# Patient Record
Sex: FEMALE | Race: ASIAN | NOT HISPANIC OR LATINO
[De-identification: names, ages, dates, MRNs, and addresses within clinical notes are randomized per-mention and may not be internally consistent; named-entity substitution may affect disease eponyms.]

---

## 2017-08-02 ENCOUNTER — APPOINTMENT (OUTPATIENT)
Dept: MAMMOGRAPHY | Facility: IMAGING CENTER | Age: 62
End: 2017-08-02
Payer: COMMERCIAL

## 2017-08-02 ENCOUNTER — OUTPATIENT (OUTPATIENT)
Dept: OUTPATIENT SERVICES | Facility: HOSPITAL | Age: 62
LOS: 1 days | End: 2017-08-02
Payer: COMMERCIAL

## 2017-08-02 DIAGNOSIS — Z00.8 ENCOUNTER FOR OTHER GENERAL EXAMINATION: ICD-10-CM

## 2017-08-02 PROCEDURE — 77067 SCR MAMMO BI INCL CAD: CPT | Mod: 26

## 2017-08-02 PROCEDURE — 77063 BREAST TOMOSYNTHESIS BI: CPT

## 2017-08-02 PROCEDURE — G0202: CPT | Mod: 26

## 2017-08-02 PROCEDURE — 77063 BREAST TOMOSYNTHESIS BI: CPT | Mod: 26

## 2017-08-02 PROCEDURE — 77067 SCR MAMMO BI INCL CAD: CPT

## 2018-09-06 ENCOUNTER — OUTPATIENT (OUTPATIENT)
Dept: OUTPATIENT SERVICES | Facility: HOSPITAL | Age: 63
LOS: 1 days | End: 2018-09-06
Payer: COMMERCIAL

## 2018-09-06 ENCOUNTER — APPOINTMENT (OUTPATIENT)
Dept: MAMMOGRAPHY | Facility: IMAGING CENTER | Age: 63
End: 2018-09-06
Payer: COMMERCIAL

## 2018-09-06 DIAGNOSIS — Z00.8 ENCOUNTER FOR OTHER GENERAL EXAMINATION: ICD-10-CM

## 2018-09-06 PROCEDURE — 77067 SCR MAMMO BI INCL CAD: CPT | Mod: 26

## 2018-09-06 PROCEDURE — 77063 BREAST TOMOSYNTHESIS BI: CPT

## 2018-09-06 PROCEDURE — 77067 SCR MAMMO BI INCL CAD: CPT

## 2018-09-06 PROCEDURE — 77063 BREAST TOMOSYNTHESIS BI: CPT | Mod: 26

## 2018-10-31 ENCOUNTER — APPOINTMENT (OUTPATIENT)
Dept: GASTROENTEROLOGY | Facility: CLINIC | Age: 63
End: 2018-10-31
Payer: COMMERCIAL

## 2018-10-31 VITALS
DIASTOLIC BLOOD PRESSURE: 70 MMHG | RESPIRATION RATE: 16 BRPM | TEMPERATURE: 97.7 F | HEIGHT: 62 IN | SYSTOLIC BLOOD PRESSURE: 110 MMHG | HEART RATE: 62 BPM | WEIGHT: 97 LBS | BODY MASS INDEX: 17.85 KG/M2 | OXYGEN SATURATION: 98 %

## 2018-10-31 DIAGNOSIS — Z79.01 LONG TERM (CURRENT) USE OF ANTICOAGULANTS: ICD-10-CM

## 2018-10-31 DIAGNOSIS — I48.91 UNSPECIFIED ATRIAL FIBRILLATION: ICD-10-CM

## 2018-10-31 DIAGNOSIS — E11.9 TYPE 2 DIABETES MELLITUS W/OUT COMPLICATIONS: ICD-10-CM

## 2018-10-31 DIAGNOSIS — Z86.79 PERSONAL HISTORY OF OTHER DISEASES OF THE CIRCULATORY SYSTEM: ICD-10-CM

## 2018-10-31 DIAGNOSIS — Z86.39 PERSONAL HISTORY OF OTHER ENDOCRINE, NUTRITIONAL AND METABOLIC DISEASE: ICD-10-CM

## 2018-10-31 DIAGNOSIS — Z78.9 OTHER SPECIFIED HEALTH STATUS: ICD-10-CM

## 2018-10-31 PROCEDURE — 99204 OFFICE O/P NEW MOD 45 MIN: CPT

## 2018-10-31 RX ORDER — WARFARIN 2.5 MG/1
2.5 TABLET ORAL
Refills: 0 | Status: ACTIVE | COMMUNITY

## 2018-10-31 RX ORDER — SITAGLIPTIN AND METFORMIN HYDROCHLORIDE 50; 1000 MG/1; MG/1
50-1000 TABLET, FILM COATED ORAL
Refills: 0 | Status: ACTIVE | COMMUNITY

## 2018-10-31 RX ORDER — METOPROLOL SUCCINATE 25 MG/1
25 TABLET, EXTENDED RELEASE ORAL
Refills: 0 | Status: ACTIVE | COMMUNITY

## 2018-10-31 RX ORDER — DIGOXIN 250 MCG
0.25 TABLET ORAL
Refills: 0 | Status: ACTIVE | COMMUNITY

## 2018-10-31 RX ORDER — ENALAPRIL MALEATE 2.5 MG/1
2.5 TABLET ORAL
Refills: 0 | Status: ACTIVE | COMMUNITY

## 2018-10-31 RX ORDER — SIMVASTATIN 20 MG/1
20 TABLET, FILM COATED ORAL
Refills: 0 | Status: ACTIVE | COMMUNITY

## 2019-01-10 ENCOUNTER — MESSAGE (OUTPATIENT)
Age: 64
End: 2019-01-10

## 2019-01-10 DIAGNOSIS — Z12.11 ENCOUNTER FOR SCREENING FOR MALIGNANT NEOPLASM OF COLON: ICD-10-CM

## 2019-02-19 ENCOUNTER — MESSAGE (OUTPATIENT)
Age: 64
End: 2019-02-19

## 2019-02-19 ENCOUNTER — MEDICATION RENEWAL (OUTPATIENT)
Age: 64
End: 2019-02-19

## 2019-02-19 RX ORDER — POLYETHYLENE GLYCOL 3350, SODIUM SULFATE, SODIUM CHLORIDE, POTASSIUM CHLORIDE, ASCORBIC ACID, SODIUM ASCORBATE 7.5-2.691G
100 KIT ORAL
Qty: 1 | Refills: 0 | Status: DISCONTINUED | COMMUNITY
Start: 2019-01-10 | End: 2019-02-19

## 2019-02-19 RX ORDER — POLYETHYLENE GLYCOL 3350, SODIUM SULFATE, SODIUM CHLORIDE, POTASSIUM CHLORIDE, ASCORBIC ACID, SODIUM ASCORBATE 7.5-2.691G
100 KIT ORAL
Qty: 1 | Refills: 0 | Status: DISCONTINUED | COMMUNITY
Start: 2018-10-31 | End: 2019-02-19

## 2019-02-27 ENCOUNTER — MEDICATION RENEWAL (OUTPATIENT)
Age: 64
End: 2019-02-27

## 2019-02-27 RX ORDER — POLYETHYLENE GLYCOL 3350 AND ELECTROLYTES WITH LEMON FLAVOR 236; 22.74; 6.74; 5.86; 2.97 G/4L; G/4L; G/4L; G/4L; G/4L
236 POWDER, FOR SOLUTION ORAL
Qty: 1 | Refills: 0 | Status: ACTIVE | COMMUNITY
Start: 2019-02-19 | End: 1900-01-01

## 2019-02-28 ENCOUNTER — MESSAGE (OUTPATIENT)
Age: 64
End: 2019-02-28

## 2019-03-07 ENCOUNTER — APPOINTMENT (OUTPATIENT)
Dept: GASTROENTEROLOGY | Facility: HOSPITAL | Age: 64
End: 2019-03-07

## 2020-12-21 PROBLEM — Z12.11 ENCOUNTER FOR SCREENING COLONOSCOPY: Status: RESOLVED | Noted: 2018-10-31 | Resolved: 2020-12-21

## 2020-12-31 ENCOUNTER — OUTPATIENT (OUTPATIENT)
Dept: OUTPATIENT SERVICES | Facility: HOSPITAL | Age: 65
LOS: 1 days | End: 2020-12-31
Payer: COMMERCIAL

## 2020-12-31 ENCOUNTER — APPOINTMENT (OUTPATIENT)
Dept: MAMMOGRAPHY | Facility: IMAGING CENTER | Age: 65
End: 2020-12-31
Payer: COMMERCIAL

## 2020-12-31 DIAGNOSIS — Z00.8 ENCOUNTER FOR OTHER GENERAL EXAMINATION: ICD-10-CM

## 2020-12-31 PROCEDURE — 77063 BREAST TOMOSYNTHESIS BI: CPT

## 2020-12-31 PROCEDURE — 77063 BREAST TOMOSYNTHESIS BI: CPT | Mod: 26

## 2020-12-31 PROCEDURE — 77067 SCR MAMMO BI INCL CAD: CPT

## 2020-12-31 PROCEDURE — 77067 SCR MAMMO BI INCL CAD: CPT | Mod: 26

## 2022-03-15 ENCOUNTER — OUTPATIENT (OUTPATIENT)
Dept: OUTPATIENT SERVICES | Facility: HOSPITAL | Age: 67
LOS: 1 days | End: 2022-03-15
Payer: COMMERCIAL

## 2022-03-15 ENCOUNTER — APPOINTMENT (OUTPATIENT)
Dept: MAMMOGRAPHY | Facility: IMAGING CENTER | Age: 67
End: 2022-03-15
Payer: COMMERCIAL

## 2022-03-15 DIAGNOSIS — Z00.8 ENCOUNTER FOR OTHER GENERAL EXAMINATION: ICD-10-CM

## 2022-03-15 PROCEDURE — 77063 BREAST TOMOSYNTHESIS BI: CPT

## 2022-03-15 PROCEDURE — 77067 SCR MAMMO BI INCL CAD: CPT

## 2022-03-15 PROCEDURE — 77063 BREAST TOMOSYNTHESIS BI: CPT | Mod: 26

## 2022-03-15 PROCEDURE — 77067 SCR MAMMO BI INCL CAD: CPT | Mod: 26

## 2023-07-27 ENCOUNTER — APPOINTMENT (OUTPATIENT)
Dept: MAMMOGRAPHY | Facility: IMAGING CENTER | Age: 68
End: 2023-07-27
Payer: COMMERCIAL

## 2023-07-27 ENCOUNTER — OUTPATIENT (OUTPATIENT)
Dept: OUTPATIENT SERVICES | Facility: HOSPITAL | Age: 68
LOS: 1 days | End: 2023-07-27
Payer: COMMERCIAL

## 2023-07-27 DIAGNOSIS — Z00.8 ENCOUNTER FOR OTHER GENERAL EXAMINATION: ICD-10-CM

## 2023-07-27 PROCEDURE — 77063 BREAST TOMOSYNTHESIS BI: CPT | Mod: 26

## 2023-07-27 PROCEDURE — 77067 SCR MAMMO BI INCL CAD: CPT

## 2023-07-27 PROCEDURE — 77063 BREAST TOMOSYNTHESIS BI: CPT

## 2023-07-27 PROCEDURE — 77067 SCR MAMMO BI INCL CAD: CPT | Mod: 26

## 2023-10-30 ENCOUNTER — INPATIENT (INPATIENT)
Facility: HOSPITAL | Age: 68
LOS: 3 days | Discharge: HOME | End: 2023-11-03
Attending: HOSPITALIST | Admitting: HOSPITALIST
Payer: COMMERCIAL

## 2023-10-30 VITALS
HEART RATE: 97 BPM | OXYGEN SATURATION: 100 % | SYSTOLIC BLOOD PRESSURE: 127 MMHG | TEMPERATURE: 98 F | RESPIRATION RATE: 18 BRPM | DIASTOLIC BLOOD PRESSURE: 57 MMHG

## 2023-10-30 DIAGNOSIS — R42 DIZZINESS AND GIDDINESS: ICD-10-CM

## 2023-10-30 LAB
ALBUMIN SERPL ELPH-MCNC: 4.7 G/DL — SIGNIFICANT CHANGE UP (ref 3.3–5)
ALBUMIN SERPL ELPH-MCNC: 4.7 G/DL — SIGNIFICANT CHANGE UP (ref 3.3–5)
ALP SERPL-CCNC: 69 U/L — SIGNIFICANT CHANGE UP (ref 40–120)
ALP SERPL-CCNC: 69 U/L — SIGNIFICANT CHANGE UP (ref 40–120)
ALT FLD-CCNC: 30 U/L — SIGNIFICANT CHANGE UP (ref 4–33)
ALT FLD-CCNC: 30 U/L — SIGNIFICANT CHANGE UP (ref 4–33)
ANION GAP SERPL CALC-SCNC: 8 MMOL/L — SIGNIFICANT CHANGE UP (ref 7–14)
ANION GAP SERPL CALC-SCNC: 8 MMOL/L — SIGNIFICANT CHANGE UP (ref 7–14)
APTT BLD: 43.3 SEC — HIGH (ref 24.5–35.6)
APTT BLD: 43.3 SEC — HIGH (ref 24.5–35.6)
AST SERPL-CCNC: 23 U/L — SIGNIFICANT CHANGE UP (ref 4–32)
AST SERPL-CCNC: 23 U/L — SIGNIFICANT CHANGE UP (ref 4–32)
BILIRUB SERPL-MCNC: 0.6 MG/DL — SIGNIFICANT CHANGE UP (ref 0.2–1.2)
BILIRUB SERPL-MCNC: 0.6 MG/DL — SIGNIFICANT CHANGE UP (ref 0.2–1.2)
BUN SERPL-MCNC: 15 MG/DL — SIGNIFICANT CHANGE UP (ref 7–23)
BUN SERPL-MCNC: 15 MG/DL — SIGNIFICANT CHANGE UP (ref 7–23)
CALCIUM SERPL-MCNC: 9.5 MG/DL — SIGNIFICANT CHANGE UP (ref 8.4–10.5)
CALCIUM SERPL-MCNC: 9.5 MG/DL — SIGNIFICANT CHANGE UP (ref 8.4–10.5)
CHLORIDE SERPL-SCNC: 103 MMOL/L — SIGNIFICANT CHANGE UP (ref 98–107)
CHLORIDE SERPL-SCNC: 103 MMOL/L — SIGNIFICANT CHANGE UP (ref 98–107)
CO2 SERPL-SCNC: 30 MMOL/L — SIGNIFICANT CHANGE UP (ref 22–31)
CO2 SERPL-SCNC: 30 MMOL/L — SIGNIFICANT CHANGE UP (ref 22–31)
CREAT SERPL-MCNC: 0.4 MG/DL — LOW (ref 0.5–1.3)
CREAT SERPL-MCNC: 0.4 MG/DL — LOW (ref 0.5–1.3)
EGFR: 109 ML/MIN/1.73M2 — SIGNIFICANT CHANGE UP
EGFR: 109 ML/MIN/1.73M2 — SIGNIFICANT CHANGE UP
GLUCOSE SERPL-MCNC: 200 MG/DL — HIGH (ref 70–99)
GLUCOSE SERPL-MCNC: 200 MG/DL — HIGH (ref 70–99)
HCT VFR BLD CALC: 47.3 % — HIGH (ref 34.5–45)
HCT VFR BLD CALC: 47.3 % — HIGH (ref 34.5–45)
HGB BLD-MCNC: 15.2 G/DL — SIGNIFICANT CHANGE UP (ref 11.5–15.5)
HGB BLD-MCNC: 15.2 G/DL — SIGNIFICANT CHANGE UP (ref 11.5–15.5)
INR BLD: 2.06 RATIO — HIGH (ref 0.85–1.18)
INR BLD: 2.06 RATIO — HIGH (ref 0.85–1.18)
MCHC RBC-ENTMCNC: 29.9 PG — SIGNIFICANT CHANGE UP (ref 27–34)
MCHC RBC-ENTMCNC: 29.9 PG — SIGNIFICANT CHANGE UP (ref 27–34)
MCHC RBC-ENTMCNC: 32.1 GM/DL — SIGNIFICANT CHANGE UP (ref 32–36)
MCHC RBC-ENTMCNC: 32.1 GM/DL — SIGNIFICANT CHANGE UP (ref 32–36)
MCV RBC AUTO: 92.9 FL — SIGNIFICANT CHANGE UP (ref 80–100)
MCV RBC AUTO: 92.9 FL — SIGNIFICANT CHANGE UP (ref 80–100)
NRBC # BLD: 0 /100 WBCS — SIGNIFICANT CHANGE UP (ref 0–0)
NRBC # BLD: 0 /100 WBCS — SIGNIFICANT CHANGE UP (ref 0–0)
NRBC # FLD: 0 K/UL — SIGNIFICANT CHANGE UP (ref 0–0)
NRBC # FLD: 0 K/UL — SIGNIFICANT CHANGE UP (ref 0–0)
PLATELET # BLD AUTO: 251 K/UL — SIGNIFICANT CHANGE UP (ref 150–400)
PLATELET # BLD AUTO: 251 K/UL — SIGNIFICANT CHANGE UP (ref 150–400)
POTASSIUM SERPL-MCNC: 4.7 MMOL/L — SIGNIFICANT CHANGE UP (ref 3.5–5.3)
POTASSIUM SERPL-MCNC: 4.7 MMOL/L — SIGNIFICANT CHANGE UP (ref 3.5–5.3)
POTASSIUM SERPL-SCNC: 4.7 MMOL/L — SIGNIFICANT CHANGE UP (ref 3.5–5.3)
POTASSIUM SERPL-SCNC: 4.7 MMOL/L — SIGNIFICANT CHANGE UP (ref 3.5–5.3)
PROT SERPL-MCNC: 7.5 G/DL — SIGNIFICANT CHANGE UP (ref 6–8.3)
PROT SERPL-MCNC: 7.5 G/DL — SIGNIFICANT CHANGE UP (ref 6–8.3)
PROTHROM AB SERPL-ACNC: 22.6 SEC — HIGH (ref 9.5–13)
PROTHROM AB SERPL-ACNC: 22.6 SEC — HIGH (ref 9.5–13)
RBC # BLD: 5.09 M/UL — SIGNIFICANT CHANGE UP (ref 3.8–5.2)
RBC # BLD: 5.09 M/UL — SIGNIFICANT CHANGE UP (ref 3.8–5.2)
RBC # FLD: 13.3 % — SIGNIFICANT CHANGE UP (ref 10.3–14.5)
RBC # FLD: 13.3 % — SIGNIFICANT CHANGE UP (ref 10.3–14.5)
SODIUM SERPL-SCNC: 141 MMOL/L — SIGNIFICANT CHANGE UP (ref 135–145)
SODIUM SERPL-SCNC: 141 MMOL/L — SIGNIFICANT CHANGE UP (ref 135–145)
WBC # BLD: 7.17 K/UL — SIGNIFICANT CHANGE UP (ref 3.8–10.5)
WBC # BLD: 7.17 K/UL — SIGNIFICANT CHANGE UP (ref 3.8–10.5)
WBC # FLD AUTO: 7.17 K/UL — SIGNIFICANT CHANGE UP (ref 3.8–10.5)
WBC # FLD AUTO: 7.17 K/UL — SIGNIFICANT CHANGE UP (ref 3.8–10.5)

## 2023-10-30 PROCEDURE — 99285 EMERGENCY DEPT VISIT HI MDM: CPT

## 2023-10-30 PROCEDURE — 70498 CT ANGIOGRAPHY NECK: CPT | Mod: 26,MA

## 2023-10-30 PROCEDURE — 70496 CT ANGIOGRAPHY HEAD: CPT | Mod: 26,MA

## 2023-10-30 RX ORDER — SODIUM CHLORIDE 9 MG/ML
1000 INJECTION INTRAMUSCULAR; INTRAVENOUS; SUBCUTANEOUS ONCE
Refills: 0 | Status: COMPLETED | OUTPATIENT
Start: 2023-10-30 | End: 2023-10-30

## 2023-10-30 RX ORDER — SODIUM CHLORIDE 9 MG/ML
1000 INJECTION, SOLUTION INTRAVENOUS ONCE
Refills: 0 | Status: COMPLETED | OUTPATIENT
Start: 2023-10-30 | End: 2023-10-30

## 2023-10-30 RX ORDER — WARFARIN SODIUM 2.5 MG/1
5 TABLET ORAL ONCE
Refills: 0 | Status: COMPLETED | OUTPATIENT
Start: 2023-10-30 | End: 2023-10-30

## 2023-10-30 RX ORDER — MECLIZINE HCL 12.5 MG
25 TABLET ORAL ONCE
Refills: 0 | Status: COMPLETED | OUTPATIENT
Start: 2023-10-30 | End: 2023-10-30

## 2023-10-30 RX ADMIN — WARFARIN SODIUM 5 MILLIGRAM(S): 2.5 TABLET ORAL at 23:19

## 2023-10-30 RX ADMIN — Medication 25 MILLIGRAM(S): at 14:19

## 2023-10-30 RX ADMIN — SODIUM CHLORIDE 1000 MILLILITER(S): 9 INJECTION, SOLUTION INTRAVENOUS at 20:11

## 2023-10-30 RX ADMIN — SODIUM CHLORIDE 1000 MILLILITER(S): 9 INJECTION INTRAMUSCULAR; INTRAVENOUS; SUBCUTANEOUS at 14:59

## 2023-10-30 NOTE — ED ADULT TRIAGE NOTE - CHIEF COMPLAINT QUOTE
Patient c/o dizziness x 3 days. phx-DM2, AVR, MVR. Denies CP, no SOB noted. Breathing non-labored. FS don.e Patient c/o dizziness x 3 days. phx-DM2, AVR, MVR on coumadin. Denies CP, no SOB noted. Breathing non-labored. FS don.e

## 2023-10-30 NOTE — ED PROVIDER NOTE - NSICDXPASTMEDICALHX_GEN_ALL_CORE_FT
PAST MEDICAL HISTORY:  H/O aortic valve replacement     H/O mitral valve repair     H/O mitral valve repair     T2DM (type 2 diabetes mellitus)

## 2023-10-30 NOTE — ED PROVIDER NOTE - ATTENDING CONTRIBUTION TO CARE
I performed a face-to-face evaluation of the patient and performed a history and physical examination. I agree with the history and physical examination. If this was a PA visit, I personally saw the patient with the PA and performed a substantive portion of the visit including all aspects of the medical decision making.    AVR and MVR (Coumadin). P/w orthostatic dizziness. No bleeding. 3 lb wt. loss in the last 1-2 yrs. Has appetite. Hasn't had EGD. No F. No LE edema. Has known AS murmur. Check EKG. Give IVF. Check Hb and INR (? bleeding on Coumadin).

## 2023-10-30 NOTE — CONSULT NOTE ADULT - SUBJECTIVE AND OBJECTIVE BOX
Neurology Consultation     HPI: Patient CAMILO RUBALCAVA is a 69yo F PMHx DM2, mitral/aortic valve repair on coumadin, presents to ED for dizziness     ROS:    NIHSS:   preMRS:   ICH:   ABCD2:    Review of Systems:     PAST MEDICAL & SURGICAL HISTORY:  T2DM (type 2 diabetes mellitus)    H/O aortic valve replacement    H/O mitral valve repair    H/O mitral valve repair    FAMILY HISTORY:    MEDICATIONS   MEDICATIONS  (STANDING):    MEDICATIONS  (PRN):    ALLERGIES/INTOLERANCES:  Allergies  No Known Allergies    Intolerances    VITALS & EXAMINATION:  Vital Signs Last 24 Hrs  T(C): 36.8 (30 Oct 2023 12:41), Max: 36.8 (30 Oct 2023 12:41)  T(F): 98.2 (30 Oct 2023 12:41), Max: 98.2 (30 Oct 2023 12:41)  HR: 97 (30 Oct 2023 12:41) (97 - 97)  BP: 127/57 (30 Oct 2023 12:41) (127/57 - 127/57)  BP(mean): --  RR: 18 (30 Oct 2023 12:41) (18 - 18)  SpO2: 100% (30 Oct 2023 12:41) (100% - 100%)    Parameters below as of 30 Oct 2023 12:41  Patient On (Oxygen Delivery Method): room air           LABORATORY:  CBC                       15.2   7.17  )-----------( 251      ( 30 Oct 2023 15:23 )             47.3     Chem 10-30    141  |  103  |  15  ----------------------------<  200<H>  4.7   |  30  |  0.40<L>    Ca    9.5      30 Oct 2023 15:23    TPro  7.5  /  Alb  4.7  /  TBili  0.6  /  DBili  x   /  AST  23  /  ALT  30  /  AlkPhos  69  10-30    LFTs LIVER FUNCTIONS - ( 30 Oct 2023 15:23 )  Alb: 4.7 g/dL / Pro: 7.5 g/dL / ALK PHOS: 69 U/L / ALT: 30 U/L / AST: 23 U/L / GGT: x           Coagulopathy PT/INR - ( 30 Oct 2023 15:23 )   PT: 22.6 sec;   INR: 2.06 ratio         PTT - ( 30 Oct 2023 15:23 )  PTT:43.3 sec  Lipid Panel   A1c   Cardiac enzymes     U/A Urinalysis Basic - ( 30 Oct 2023 15:23 )    Color: x / Appearance: x / SG: x / pH: x  Gluc: 200 mg/dL / Ketone: x  / Bili: x / Urobili: x   Blood: x / Protein: x / Nitrite: x   Leuk Esterase: x / RBC: x / WBC x   Sq Epi: x / Non Sq Epi: x / Bacteria: x      CSF  Other    STUDIES & IMAGING: (EEG, CT, MR, U/S, TTE/NATHAN): Neurology Consultation     Patient accompanied by spouse    HPI: Patient CAMILO RUBALCAVA is a 67yo F PMHx DM2, mitral/aortic valve repair on coumadin, presents to ED for dizziness. Patient states since Saturday a.m. she has experienced constant dizziness that is not room spinning or violetta lightheadedness. States dizziness improves when laying at 45% incline but not flat or sitting, standing.   No associated fever, chills, headache, nausea, vomiting      ROS:    NIHSS:   preMRS:   ICH:   ABCD2:    Review of Systems:     PAST MEDICAL & SURGICAL HISTORY:  T2DM (type 2 diabetes mellitus)    H/O aortic valve replacement    H/O mitral valve repair    H/O mitral valve repair    FAMILY HISTORY:    MEDICATIONS   MEDICATIONS  (STANDING):    MEDICATIONS  (PRN):    ALLERGIES/INTOLERANCES:  Allergies  No Known Allergies    Intolerances    VITALS & EXAMINATION:  Vital Signs Last 24 Hrs  T(C): 36.8 (30 Oct 2023 12:41), Max: 36.8 (30 Oct 2023 12:41)  T(F): 98.2 (30 Oct 2023 12:41), Max: 98.2 (30 Oct 2023 12:41)  HR: 97 (30 Oct 2023 12:41) (97 - 97)  BP: 127/57 (30 Oct 2023 12:41) (127/57 - 127/57)  BP(mean): --  RR: 18 (30 Oct 2023 12:41) (18 - 18)  SpO2: 100% (30 Oct 2023 12:41) (100% - 100%)    Parameters below as of 30 Oct 2023 12:41  Patient On (Oxygen Delivery Method): room air           LABORATORY:  CBC                       15.2   7.17  )-----------( 251      ( 30 Oct 2023 15:23 )             47.3     Chem 10-30    141  |  103  |  15  ----------------------------<  200<H>  4.7   |  30  |  0.40<L>    Ca    9.5      30 Oct 2023 15:23    TPro  7.5  /  Alb  4.7  /  TBili  0.6  /  DBili  x   /  AST  23  /  ALT  30  /  AlkPhos  69  10-30    LFTs LIVER FUNCTIONS - ( 30 Oct 2023 15:23 )  Alb: 4.7 g/dL / Pro: 7.5 g/dL / ALK PHOS: 69 U/L / ALT: 30 U/L / AST: 23 U/L / GGT: x           Coagulopathy PT/INR - ( 30 Oct 2023 15:23 )   PT: 22.6 sec;   INR: 2.06 ratio         PTT - ( 30 Oct 2023 15:23 )  PTT:43.3 sec  Lipid Panel   A1c   Cardiac enzymes     U/A Urinalysis Basic - ( 30 Oct 2023 15:23 )    Color: x / Appearance: x / SG: x / pH: x  Gluc: 200 mg/dL / Ketone: x  / Bili: x / Urobili: x   Blood: x / Protein: x / Nitrite: x   Leuk Esterase: x / RBC: x / WBC x   Sq Epi: x / Non Sq Epi: x / Bacteria: x      CSF  Other    STUDIES & IMAGING: (EEG, CT, MR, U/S, TTE/NATHAN): Neurology Consultation     Patient accompanied by spouse    HPI: Patient Jj Rodriguez is a 67yo F PMHx DM2, mitral/aortic valve repair on coumadin, presents to ED for dizziness. Patient states since Saturday a.m. she has experienced constant dizziness that is not room spinning or violetta lightheadedness. States dizziness improves when laying at 45% incline but not flat or sitting, standing. Never had similar symptoms in similar severity. Does note in both eyes when looking at light she sees streaks. No double vision. No associated fever, chills, headache, nausea, vomiting, headaches, tinnitus, ear fullness, speech changes, word finding difficulty, neck stiffness, extremity weakness numbness, gait difficulty, chest pain, shortness of breath. No hx of stroke. She does have mechanical valves for which she is on coumadin. Not on asa. Nonsmoking.     NIHSS: 0  preMRS:  0    PAST MEDICAL & SURGICAL HISTORY:  T2DM (type 2 diabetes mellitus)    H/O aortic valve replacement    H/O mitral valve repair    H/O mitral valve repair    FAMILY HISTORY:    MEDICATIONS   MEDICATIONS  (STANDING):    MEDICATIONS  (PRN):    ALLERGIES/INTOLERANCES:  Allergies  No Known Allergies    Intolerances    VITALS & EXAMINATION:  Vital Signs Last 24 Hrs  T(C): 36.8 (30 Oct 2023 12:41), Max: 36.8 (30 Oct 2023 12:41)  T(F): 98.2 (30 Oct 2023 12:41), Max: 98.2 (30 Oct 2023 12:41)  HR: 97 (30 Oct 2023 12:41) (97 - 97)  BP: 127/57 (30 Oct 2023 12:41) (127/57 - 127/57)  BP(mean): --  RR: 18 (30 Oct 2023 12:41) (18 - 18)  SpO2: 100% (30 Oct 2023 12:41) (100% - 100%)    Parameters below as of 30 Oct 2023 12:41  Patient On (Oxygen Delivery Method): room air     General:  Constitutional: Female, appears stated age, nontoxic, not in distress, sitting up in stretcher  Head: Normocephalic;   Eyes: clear sclera;   Extremities: No cyanosis;   Resp: breathing comfortably  Neck: no nuchal rigidity      Neurological (>12):  MS: Awake, alert. Oriented person place situation. Follows commands. Attends to examiner  Language: Speech is hypophonic, clear, fluent, good repetition,  comprehension, registration of words.  CNs: PERRL (R 3mm, L 3mm). VFF. EOMI. No disconjugate gaze, nystagmus. V1-3 intact LT, No facial asymmetry b/l. Hearing grossly normal to finger rub. Tongue midline and can move side to side.     Motor - Normal bulk and tone throughout. No pronator drift.  L/R (out of 5 each)       Deltoid  5/5    Biceps   5/5      Triceps  5/5         Wrist str 5/5       5/5   L/R (out of 5 each)       Hip Flexion  5/5   Knee Extension  5/5  Dorsiflexion  5/5      Plantar Flexion 5/5     Sensation: Intact to LT b/l. Cortical: Extinction on DSS (neglect): none  Reflexes L/R:  Biceps(C5) 2/2  BR(C6) 2/2   Triceps(C7)  2/2 Patellar(L4)   2/2   Ankles 2/2   Toes: mute b/l, no ankle clonus  Negative davis reflex b/l  Coordination: No dysmetria to FTN b/l UE, HTS intact  Gait: difficult to assess due to dizziness     LABORATORY:  CBC                       15.2   7.17  )-----------( 251      ( 30 Oct 2023 15:23 )             47.3     Chem 10-30    141  |  103  |  15  ----------------------------<  200<H>  4.7   |  30  |  0.40<L>    Ca    9.5      30 Oct 2023 15:23    TPro  7.5  /  Alb  4.7  /  TBili  0.6  /  DBili  x   /  AST  23  /  ALT  30  /  AlkPhos  69  10-30    LFTs LIVER FUNCTIONS - ( 30 Oct 2023 15:23 )  Alb: 4.7 g/dL / Pro: 7.5 g/dL / ALK PHOS: 69 U/L / ALT: 30 U/L / AST: 23 U/L / GGT: x           Coagulopathy PT/INR - ( 30 Oct 2023 15:23 )   PT: 22.6 sec;   INR: 2.06 ratio      PTT - ( 30 Oct 2023 15:23 )  PTT:43.3 sec  Lipid Panel   A1c   Cardiac enzymes     U/A Urinalysis Basic - ( 30 Oct 2023 15:23 )    Color: x / Appearance: x / SG: x / pH: x  Gluc: 200 mg/dL / Ketone: x  / Bili: x / Urobili: x   Blood: x / Protein: x / Nitrite: x   Leuk Esterase: x / RBC: x / WBC x   Sq Epi: x / Non Sq Epi: x / Bacteria: x    CSF  Other    STUDIES & IMAGING: (EEG, CT, MR, U/S, TTE/NATHAN): Neurology Consultation     Patient accompanied by spouse    HPI: Patient Jj Rodriguez is a 67yo F PMHx DM2, mitral/aortic valve repair on coumadin, presents to ED for dizziness. Patient states since Saturday a.m. she has experienced constant dizziness that is not room spinning or violetta lightheadedness. States dizziness improves when laying at 45% incline but not flat or sitting, standing. Never had similar symptoms in similar severity. Does note in both eyes when looking at light she sees streaks. No double vision. No associated fever, chills, headache, nausea, vomiting, headaches, tinnitus, ear fullness, speech changes, word finding difficulty, neck stiffness, extremity weakness numbness, gait difficulty, chest pain, shortness of breath. No hx of stroke. She does have mechanical valves for which she is on coumadin. Not on asa. Nonsmoking.     NIHSS: 0  preMRS:  0    PAST MEDICAL & SURGICAL HISTORY:  T2DM (type 2 diabetes mellitus)    H/O aortic valve replacement    H/O mitral valve repair    H/O mitral valve repair    FAMILY HISTORY:    MEDICATIONS   MEDICATIONS  (STANDING):    MEDICATIONS  (PRN):    ALLERGIES/INTOLERANCES:  Allergies  No Known Allergies    Intolerances    VITALS & EXAMINATION:  Vital Signs Last 24 Hrs  T(C): 36.8 (30 Oct 2023 12:41), Max: 36.8 (30 Oct 2023 12:41)  T(F): 98.2 (30 Oct 2023 12:41), Max: 98.2 (30 Oct 2023 12:41)  HR: 97 (30 Oct 2023 12:41) (97 - 97)  BP: 127/57 (30 Oct 2023 12:41) (127/57 - 127/57)  BP(mean): --  RR: 18 (30 Oct 2023 12:41) (18 - 18)  SpO2: 100% (30 Oct 2023 12:41) (100% - 100%)    Parameters below as of 30 Oct 2023 12:41  Patient On (Oxygen Delivery Method): room air     General:  Constitutional: Female, appears stated age, nontoxic, not in distress, sitting up in stretcher  Head: Normocephalic;   Eyes: clear sclera;   Extremities: No cyanosis;   Resp: breathing comfortably  Neck: no nuchal rigidity      Neurological (>12):  MS: Awake, alert. Oriented person place situation. Follows commands. Attends to examiner  Language: Speech is hypophonic, clear, fluent, good repetition,  comprehension, registration of words.  CNs: PERRL (R 3mm, L 3mm). VFF. EOMI. No disconjugate gaze, nystagmus. V1-3 intact LT, No facial asymmetry b/l. Hearing grossly normal to finger rub. Tongue midline and can move side to side.     Motor - Normal bulk and tone throughout. No pronator drift.  L/R (out of 5 each)       Deltoid  5/5    Biceps   5/5      Triceps  5/5         Wrist str 5/5       5/5   L/R (out of 5 each)       Hip Flexion  5/5   Knee Extension  5/5  Dorsiflexion  5/5      Plantar Flexion 5/5     Sensation: Intact to LT b/l. Cortical: Extinction on DSS (neglect): none  Reflexes L/R:  Biceps(C5) 2/2  BR(C6) 2/2   Triceps(C7)  2/2 Patellar(L4)   2/2   Ankles 2/2   Toes: mute b/l, no ankle clonus  Negative davis reflex b/l  Coordination: No dysmetria to FTN b/l UE, HTS intact  Gait: difficult to assess due to dizziness     LABORATORY:  CBC                       15.2   7.17  )-----------( 251      ( 30 Oct 2023 15:23 )             47.3     Chem 10-30    141  |  103  |  15  ----------------------------<  200<H>  4.7   |  30  |  0.40<L>    Ca    9.5      30 Oct 2023 15:23    TPro  7.5  /  Alb  4.7  /  TBili  0.6  /  DBili  x   /  AST  23  /  ALT  30  /  AlkPhos  69  10-30    LFTs LIVER FUNCTIONS - ( 30 Oct 2023 15:23 )  Alb: 4.7 g/dL / Pro: 7.5 g/dL / ALK PHOS: 69 U/L / ALT: 30 U/L / AST: 23 U/L / GGT: x           Coagulopathy PT/INR - ( 30 Oct 2023 15:23 )   PT: 22.6 sec;   INR: 2.06 ratio      PTT - ( 30 Oct 2023 15:23 )  PTT:43.3 sec  Lipid Panel   A1c   Cardiac enzymes     U/A Urinalysis Basic - ( 30 Oct 2023 15:23 )    Color: x / Appearance: x / SG: x / pH: x  Gluc: 200 mg/dL / Ketone: x  / Bili: x / Urobili: x   Blood: x / Protein: x / Nitrite: x   Leuk Esterase: x / RBC: x / WBC x   Sq Epi: x / Non Sq Epi: x / Bacteria: x    CSF  Other    STUDIES & IMAGING: (EEG, CT, MR, U/S, TTE/NATHAN):    < from: CT Angio Neck w/ IV Cont (10.30.23 @ 21:13) >    ACC: [id number] EXAM:  CT ANGIO NECK (W)AW IC   ORDERED BY: ZULEYKA GTZ     ACC: [id number] EXAM:  CT ANGIO BRAIN (W)AW IC   ORDERED BY: ZULEYKA GTZ     PROCEDURE DATE:  10/30/2023    INTERPRETATION:  CT HEAD WITHOUT CONTRAST, CTA HEAD, CTA NECK    CLINICAL INDICATIONS:dizziness    TECHNIQUE: 90cc Omnipaque 350 Intravenous contrast was administered. 2-D   MIP images. 10cc of contrast was discarded.    COMPARISON: None    FINDINGS:    NONCONTRAST CT HEAD:  Brain parenchyma: Gray-white matterdiscrimination is well preserved.   There is no mass effect or intracranial hemorrhage.    Extra-axial compartments: No extra-axial fluid collections are present.   The ventricles and sulci are normal in size and configuration for   patient's stated age. The basal cisterns are patent. Craniocervical   junction and sella turcica are within normal limits.    Calvarium, paranasal sinuses, and orbits: The calvarium is intact.   Paranasal sinuses and mastoid air cells are clear. The orbits are within   normal limits.      CTA BRAIN:  Anterior circulation: The petrous, cavernous, and supraclinoid portions   of the internal carotid arteries opacify normally. The bilateral M1   segments are widely patent and the M2/M3 subdivisions follow a normal   course and caliber with symmetric appearance. The bilateral A1 segments   are widely patent and the A2/A3 subdivisions follow a normal course and   caliber with symmetric appearance. The anterior communicating artery is   clearly visualized. Bilateral posterior communicating arteries are not   clearly seen.    Posterior circulation: The intracranial portions of the bilateral   vertebral arteries are within normal limits. Bilateral PICA origins arise   normally from the distal V4 segments. The basilar confluence is   unremarkable and the basilar artery follows a normal course and caliber.   AICA origins are identified. The bilateral superior cerebellar arteries   are within normal limits. The bilateral P1 segments are widely patent,   and the P2/P3 subdivisions following normal course and caliber with   symmetric appearance.    CTA NECK:  Normal variant 4-vessel aortic arch is identified.    Anterior circulation: The bilateral common carotid arteries opacify   normally from their origins up to the level of the bifurcations. The   bilateral carotid bulbs are within normal limits. The cervical portions   of the internal carotid arteries opacify normally up to the level of the   skull base.    Posterior circulation: Normal anatomic origin of the right vertebral   artery is noted; there is variant origin of the left vertebral artery   directly from the aortic arch. The bilateral vertebral arteries opacify   normally through their cervical segments up to the level of the skull   base.    LUNG APICES: Clear.    THYROID: Homogeneous in attenuation.    SOFT TISSUES: Within normal limits.    BONES: Median sternotomy wires.    IMPRESSION:    NONCONTRAST CT HEAD:  No evidence of large territory infarct, intracranial hemorrhage, or mass   effect.    CTA HEAD/NECK:  No proximal large vessel occlusion, aneurysm, dissection, or   hemodynamically flow-limiting stenosis identified in the head and neck.    MRI brain could be considered if symptoms persist.    --- End of Report --- Neurology Consultation     Patient accompanied by spouse    HPI: Patient Jj Rodriguez is a 67yo F PMHx DM2, mitral/aortic valve repair on coumadin, presents to ED for dizziness. Patient states since Saturday a.m. she has experienced constant dizziness that is not room spinning or violetta lightheadedness. States dizziness improves when laying at 45% incline but not flat or sitting, standing. Never had similar symptoms in similar severity. Does note in either eye when looking at light she sees streaks. No double vision. No associated fever, chills, headache, nausea, vomiting, headaches, tinnitus, ear fullness, speech changes, word finding difficulty, neck stiffness, extremity weakness numbness, gait difficulty, chest pain, shortness of breath. No other symptoms. No hx of stroke. She does have mechanical valves for which she is on coumadin. Not on asa. Nonsmoking.     NIHSS: 0  preMRS:  0    PAST MEDICAL & SURGICAL HISTORY:  T2DM (type 2 diabetes mellitus)    H/O aortic valve replacement    H/O mitral valve repair    H/O mitral valve repair    FAMILY HISTORY:    MEDICATIONS   MEDICATIONS  (STANDING):    MEDICATIONS  (PRN):    ALLERGIES/INTOLERANCES:  Allergies  No Known Allergies    Intolerances    VITALS & EXAMINATION:  Vital Signs Last 24 Hrs  T(C): 36.8 (30 Oct 2023 12:41), Max: 36.8 (30 Oct 2023 12:41)  T(F): 98.2 (30 Oct 2023 12:41), Max: 98.2 (30 Oct 2023 12:41)  HR: 97 (30 Oct 2023 12:41) (97 - 97)  BP: 127/57 (30 Oct 2023 12:41) (127/57 - 127/57)  BP(mean): --  RR: 18 (30 Oct 2023 12:41) (18 - 18)  SpO2: 100% (30 Oct 2023 12:41) (100% - 100%)    Parameters below as of 30 Oct 2023 12:41  Patient On (Oxygen Delivery Method): room air     General:  Constitutional: Female, appears stated age, nontoxic, not in distress, sitting up in stretcher  Head: Normocephalic;   Eyes: clear sclera;   Extremities: No cyanosis;   Resp: breathing comfortably  Neck: no nuchal rigidity      Neurological (>12):  MS: Awake, alert. Oriented person place situation. Follows commands. Attends to examiner  Language: Speech is hypophonic, clear, fluent, good repetition,  comprehension, registration of words.  CNs: PERRL (R 3mm, L 3mm). VFF. EOMI. No disconjugate gaze, nystagmus. V1-3 intact LT, No facial asymmetry b/l. Hearing grossly normal to finger rub. Tongue midline and can move side to side.     Motor - Normal bulk and tone throughout. No pronator drift.  L/R (out of 5 each)       Deltoid  5/5    Biceps   5/5      Triceps  5/5         Wrist str 5/5       5/5   L/R (out of 5 each)       Hip Flexion  5/5   Knee Extension  5/5  Dorsiflexion  5/5      Plantar Flexion 5/5     Sensation: Intact to LT b/l. Cortical: Extinction on DSS (neglect): none  Reflexes L/R:  Biceps(C5) 2/2  BR(C6) 2/2   Triceps(C7)  2/2 Patellar(L4)   2/2   Ankles 2/2   Toes: mute b/l, no ankle clonus  Negative davis reflex b/l  Coordination: No dysmetria to FTN b/l UE. HTS intact  Gait: difficult to assess due to dizziness     LABORATORY:  CBC                       15.2   7.17  )-----------( 251      ( 30 Oct 2023 15:23 )             47.3     Chem 10-30    141  |  103  |  15  ----------------------------<  200<H>  4.7   |  30  |  0.40<L>    Ca    9.5      30 Oct 2023 15:23    TPro  7.5  /  Alb  4.7  /  TBili  0.6  /  DBili  x   /  AST  23  /  ALT  30  /  AlkPhos  69  10-30    LFTs LIVER FUNCTIONS - ( 30 Oct 2023 15:23 )  Alb: 4.7 g/dL / Pro: 7.5 g/dL / ALK PHOS: 69 U/L / ALT: 30 U/L / AST: 23 U/L / GGT: x           Coagulopathy PT/INR - ( 30 Oct 2023 15:23 )   PT: 22.6 sec;   INR: 2.06 ratio      PTT - ( 30 Oct 2023 15:23 )  PTT:43.3 sec  Lipid Panel   A1c   Cardiac enzymes     U/A Urinalysis Basic - ( 30 Oct 2023 15:23 )    Color: x / Appearance: x / SG: x / pH: x  Gluc: 200 mg/dL / Ketone: x  / Bili: x / Urobili: x   Blood: x / Protein: x / Nitrite: x   Leuk Esterase: x / RBC: x / WBC x   Sq Epi: x / Non Sq Epi: x / Bacteria: x    CSF  Other    STUDIES & IMAGING: (EEG, CT, MR, U/S, TTE/NATHAN):    < from: CT Angio Neck w/ IV Cont (10.30.23 @ 21:13) >    ACC: [id number] EXAM:  CT ANGIO NECK (W)AW IC   ORDERED BY: ZULEYKA GTZ     ACC: [id number] EXAM:  CT ANGIO BRAIN (W)AW IC   ORDERED BY: ZULEYKA GTZ     PROCEDURE DATE:  10/30/2023    INTERPRETATION:  CT HEAD WITHOUT CONTRAST, CTA HEAD, CTA NECK    CLINICAL INDICATIONS:dizziness    TECHNIQUE: 90cc Omnipaque 350 Intravenous contrast was administered. 2-D   MIP images. 10cc of contrast was discarded.    COMPARISON: None    FINDINGS:    NONCONTRAST CT HEAD:  Brain parenchyma: Gray-white matterdiscrimination is well preserved.   There is no mass effect or intracranial hemorrhage.    Extra-axial compartments: No extra-axial fluid collections are present.   The ventricles and sulci are normal in size and configuration for   patient's stated age. The basal cisterns are patent. Craniocervical   junction and sella turcica are within normal limits.    Calvarium, paranasal sinuses, and orbits: The calvarium is intact.   Paranasal sinuses and mastoid air cells are clear. The orbits are within   normal limits.      CTA BRAIN:  Anterior circulation: The petrous, cavernous, and supraclinoid portions   of the internal carotid arteries opacify normally. The bilateral M1   segments are widely patent and the M2/M3 subdivisions follow a normal   course and caliber with symmetric appearance. The bilateral A1 segments   are widely patent and the A2/A3 subdivisions follow a normal course and   caliber with symmetric appearance. The anterior communicating artery is   clearly visualized. Bilateral posterior communicating arteries are not   clearly seen.    Posterior circulation: The intracranial portions of the bilateral   vertebral arteries are within normal limits. Bilateral PICA origins arise   normally from the distal V4 segments. The basilar confluence is   unremarkable and the basilar artery follows a normal course and caliber.   AICA origins are identified. The bilateral superior cerebellar arteries   are within normal limits. The bilateral P1 segments are widely patent,   and the P2/P3 subdivisions following normal course and caliber with   symmetric appearance.    CTA NECK:  Normal variant 4-vessel aortic arch is identified.    Anterior circulation: The bilateral common carotid arteries opacify   normally from their origins up to the level of the bifurcations. The   bilateral carotid bulbs are within normal limits. The cervical portions   of the internal carotid arteries opacify normally up to the level of the   skull base.    Posterior circulation: Normal anatomic origin of the right vertebral   artery is noted; there is variant origin of the left vertebral artery   directly from the aortic arch. The bilateral vertebral arteries opacify   normally through their cervical segments up to the level of the skull   base.    LUNG APICES: Clear.    THYROID: Homogeneous in attenuation.    SOFT TISSUES: Within normal limits.    BONES: Median sternotomy wires.    IMPRESSION:    NONCONTRAST CT HEAD:  No evidence of large territory infarct, intracranial hemorrhage, or mass   effect.    CTA HEAD/NECK:  No proximal large vessel occlusion, aneurysm, dissection, or   hemodynamically flow-limiting stenosis identified in the head and neck.    MRI brain could be considered if symptoms persist.    --- End of Report ---

## 2023-10-30 NOTE — CONSULT NOTE ADULT - ASSESSMENT
VS 98.2F, HR97, /57, RR18, 100%RA.   Labs: WBC 7.17, hgb 15.2, plt 251, coags INR 2.06, na 141, k 4.7, bicarb 30, Cr 0.40, glucose 200m, LFT wnl, POCT glucose 136.  Patient Jj Rodriguez is a 67yo F PMHx DM2, mitral/aortic valve repair on coumadin, presents to ED for dizziness. Patient states since Saturday a.m. she has experienced constant dizziness that is not room spinning or violetta lightheadedness. States dizziness improves when laying at 45% incline but not flat or sitting, standing. Never had similar symptoms in similar severity. Does note in both eyes when looking at light she sees streaks. No double vision. No associated fever, chills, headache, nausea, vomiting, headaches, tinnitus, ear fullness, speech changes, word finding difficulty, neck stiffness, extremity weakness numbness, gait difficulty, chest pain, shortness of breath. No hx of stroke. She does have mechanical valves for which she is on coumadin. Not on asa. Nonsmoking. VS 98.2F, HR97, /57, RR18, 100%RA. Labs: WBC 7.17, hgb 15.2, plt 251, coags INR 2.06, na 141, k 4.7, bicarb 30, Cr 0.40, glucose 200m, LFT wnl, POCT glucose 136.      Patient Jj Rodriguez is a 67yo F PMHx DM2, mitral/aortic valve repair on coumadin, presents to ED for dizziness. Patient states since Saturday a.m. she has experienced constant dizziness that is not room spinning or violetta lightheadedness. States dizziness improves when laying at 45% incline but not flat or sitting, standing. Never had similar symptoms in similar severity. Does note in both eyes when looking at light she sees streaks. No double vision. No associated fever, chills, headache, nausea, vomiting, headaches, tinnitus, ear fullness, speech changes, word finding difficulty, neck stiffness, extremity weakness numbness, gait difficulty, chest pain, shortness of breath. No hx of stroke. She does have mechanical valves for which she is on coumadin. Not on asa. Nonsmoking. VS 98.2F, HR97, /57, RR18, 100%RA. Labs: WBC 7.17, hgb 15.2, plt 251, coags INR 2.06, na 141, k 4.7, bicarb 30, Cr 0.40, glucose 200m, LFT wnl, POCT glucose 136. CT angio head/neck w/ con showed no proximal occlusion, aneurysm, dissection, flow limiting stenosis.     LKW: Sat AM, >24hrs  NIHSS: 0   Not a tenecteplase candidate due to outside window  Not a thrombectomy candidate due to no large vessel occlusion     Impression: Constant dizziness suspicious for peripheral vertigo. Neuro exam otherwise unrevealing for central causes. However, will attempt to evaluate for stroke.      Recommendations:   [x] No current neurovascular contraindication to continuing coumadin   [ ] HgbA1C, fasting lipid panel, CBC, CMP, coag panel, troponin  [ ] MRI brain w/o con  [ ] TTE w/ bubble study  [ ] Cardiac w/u per ED  [ ] tele, EKG, will discuss loop recorder    - Glucose control    - Neuro-checks and VS q4h  - Permissive HTN up to 220/120 for 24-48h from symptom onset unless otherwise contraindicated  - Dysphagia screen.     - fall precautions  - STAT CT head non-contrast for change in neuro exam.   - PT/ OT / DVT ppx per primary team if admitted  Patient Jj Rodriguez is a 69yo F PMHx DM2, mitral/aortic valve repair on coumadin, presents to ED for dizziness. Patient states since Saturday a.m. she has experienced constant dizziness that is not room spinning or violetta lightheadedness. States dizziness improves when laying at 45% incline but not flat or sitting, standing. Never had similar symptoms in similar severity. Does note in either eye when looking at light she sees streaks. No double vision. No associated fever, chills, headache, nausea, vomiting, headaches, tinnitus, ear fullness, speech changes, word finding difficulty, neck stiffness, extremity weakness numbness, gait difficulty, chest pain, shortness of breath. No hx of stroke. She does have mechanical valves for which she is on coumadin. Not on asa. Nonsmoking. VS 98.2F, HR97, /57, RR18, 100%RA. Labs: WBC 7.17, hgb 15.2, plt 251, coags INR 2.06, na 141, k 4.7, bicarb 30, Cr 0.40, glucose 200m, LFT wnl, POCT glucose 136. CT head w/o con: GW diff preserved. No heme. CT angio head/neck w/ con showed no proximal occlusion, aneurysm, dissection, flow limiting stenosis.     LKW: Sat AM, >24hrs  NIHSS: 0   Not a tenecteplase candidate due to outside window  Not a thrombectomy candidate due to no large vessel occlusion     Impression: Constant dizziness suspicious for peripheral vertigo. Neuro exam and CT imaging otherwise unrevealing for central causes (ie including but not limited to ischemic stroke).     Recommendations:   [x] No current neurovascular contraindication to continuing coumadin   [ ] HgbA1C, fasting lipid panel, CBC, CMP, coag panel, troponin  [-] MRI brain w/o con would be helpful but patient reports valves may not be compatible. Will monitor for now since symptoms started +24hrs ago and CT imaging unrevealing of head.  [ ] TTE w/ bubble study  [ ] Cardiac w/u per ED  [ ] tele, EKG     - Glucose control    - Neuro-checks and VS q4h  - Permissive HTN up to 220/120 for 24-48h from symptom onset unless otherwise contraindicated from other findings  - Dysphagia screen.     - fall precautions  - STAT CT head non-contrast for change in neuro exam.   - PT/ OT / DVT ppx per primary team if admitted     Discussed patient care with primary team (ED), patient and in agreement. Delay in note entry/ note updates is due to patient care. Differential diagnosis considered is not limited to that listed above. Non-neurologic findings on imaging work up per primary team if applicable.  Discussed risks/benefits of diagnostic options w/ patient and agreeable. The patient demonstrated good understanding of the diagnostic plan. Recommendations will be finalized/amended once signed by attending.

## 2023-10-30 NOTE — CONSULT NOTE ADULT - ATTENDING COMMENTS
Slight improvement after receiving meclizine.     Questionable corrective saccade with head impulse test to the left.   No nystagmus.  No skew deviation.   No ataxia - FNF, NEHAL, HKS  Gait - cautious but normal and able to do tandem several steps.      A/P  Ms. Rodriguez is a 67 yo woman with symptoms most consistent with peripheral vertigo.  The differential diagnosis includes: cardiac disease.   No clinical signs of stroke. She is already on anticoagulation for stroke risk reduction.  Patient reports that valve replacement is not MRI compatible.   Outpatient treatment - [facility] for vestibular rehabilitation - [phone].  D/W hospitalist, patient,  and daughter.   Neurology signing off. Please reconsult PRN or call [email or web address] [phone] with any questions.  Thank you.

## 2023-10-30 NOTE — ED PROVIDER NOTE - CLINICAL SUMMARY MEDICAL DECISION MAKING FREE TEXT BOX
Paty WILDER PGY-3: 69 yo F History of type 2 diabetes, mitral valve repair, aortic valve repair  on Coumadin, presenting with chief complaint of dizziness for the past 3 days.   Vital signs stable,   neuro exam within normal limits, including cerebellar testing.   Clinical picture most consistent with peripheral vertigo, will give meclizine and reassess.  Patient amenable to plan, would prefer to try medication management and reassess for improvement.  If patient still symptomatic, will obtain CT of the head and CTA to evaluate for possible central etiology of vertigo.  no chest pain or shortness of breath to suggest cardiac etiology.

## 2023-10-30 NOTE — ED PROVIDER NOTE - OBJECTIVE STATEMENT
69 yo F History of type 2 diabetes, mitral valve repair, aortic valve repair  on Coumadin, presenting with chief complaint of dizziness for the past 3 days.  Patient is accompanied by  at bedside  who works  in the lab here.  Patient states that since Friday, has been having dizziness  which she states is worsened with positional changes such as turning her head or lying down. Denying any headache, neck pain or neck stiffness. She is denying any chest pain or shortness of breath, or lightheadedness.  No abdominal pain, nausea, vomiting, fevers or chills. +feeling imbalanced.

## 2023-10-30 NOTE — ED PROVIDER NOTE - PROGRESS NOTE DETAILS
Misbah: I spoke w/ her Cards at [facility] ([place]): [phone], who suggested obtaining echo to eval for frozen aortic valve leaflet. Marie Rosenthal MD (PGY3): Patient with persistent symptoms w/ positional changes. Will redose meclizine and fluids. neuro consulted.. will see patient. Marie Rosenthal MD (PGY3): Per neuro recommend MRI.  Patient not CDU candidate at this time given multiple work-ups.  spoke to hospitalist will admit

## 2023-10-31 DIAGNOSIS — Z95.2 PRESENCE OF PROSTHETIC HEART VALVE: Chronic | ICD-10-CM

## 2023-10-31 DIAGNOSIS — E11.9 TYPE 2 DIABETES MELLITUS WITHOUT COMPLICATIONS: ICD-10-CM

## 2023-10-31 DIAGNOSIS — R42 DIZZINESS AND GIDDINESS: ICD-10-CM

## 2023-10-31 DIAGNOSIS — D68.59 OTHER PRIMARY THROMBOPHILIA: ICD-10-CM

## 2023-10-31 DIAGNOSIS — I48.20 CHRONIC ATRIAL FIBRILLATION, UNSPECIFIED: ICD-10-CM

## 2023-10-31 DIAGNOSIS — Z29.9 ENCOUNTER FOR PROPHYLACTIC MEASURES, UNSPECIFIED: ICD-10-CM

## 2023-10-31 DIAGNOSIS — Z98.890 OTHER SPECIFIED POSTPROCEDURAL STATES: Chronic | ICD-10-CM

## 2023-10-31 LAB
A1C WITH ESTIMATED AVERAGE GLUCOSE RESULT: 6.6 % — HIGH (ref 4–5.6)
A1C WITH ESTIMATED AVERAGE GLUCOSE RESULT: 6.6 % — HIGH (ref 4–5.6)
ALBUMIN SERPL ELPH-MCNC: 3.8 G/DL — SIGNIFICANT CHANGE UP (ref 3.3–5)
ALBUMIN SERPL ELPH-MCNC: 3.8 G/DL — SIGNIFICANT CHANGE UP (ref 3.3–5)
ALP SERPL-CCNC: 50 U/L — SIGNIFICANT CHANGE UP (ref 40–120)
ALP SERPL-CCNC: 50 U/L — SIGNIFICANT CHANGE UP (ref 40–120)
ALT FLD-CCNC: 28 U/L — SIGNIFICANT CHANGE UP (ref 4–33)
ALT FLD-CCNC: 28 U/L — SIGNIFICANT CHANGE UP (ref 4–33)
ANION GAP SERPL CALC-SCNC: 11 MMOL/L — SIGNIFICANT CHANGE UP (ref 7–14)
ANION GAP SERPL CALC-SCNC: 11 MMOL/L — SIGNIFICANT CHANGE UP (ref 7–14)
APTT BLD: 41.6 SEC — HIGH (ref 24.5–35.6)
APTT BLD: 41.6 SEC — HIGH (ref 24.5–35.6)
AST SERPL-CCNC: 23 U/L — SIGNIFICANT CHANGE UP (ref 4–32)
AST SERPL-CCNC: 23 U/L — SIGNIFICANT CHANGE UP (ref 4–32)
BILIRUB SERPL-MCNC: 0.4 MG/DL — SIGNIFICANT CHANGE UP (ref 0.2–1.2)
BILIRUB SERPL-MCNC: 0.4 MG/DL — SIGNIFICANT CHANGE UP (ref 0.2–1.2)
BUN SERPL-MCNC: 12 MG/DL — SIGNIFICANT CHANGE UP (ref 7–23)
BUN SERPL-MCNC: 12 MG/DL — SIGNIFICANT CHANGE UP (ref 7–23)
CALCIUM SERPL-MCNC: 8.7 MG/DL — SIGNIFICANT CHANGE UP (ref 8.4–10.5)
CALCIUM SERPL-MCNC: 8.7 MG/DL — SIGNIFICANT CHANGE UP (ref 8.4–10.5)
CHLORIDE SERPL-SCNC: 108 MMOL/L — HIGH (ref 98–107)
CHLORIDE SERPL-SCNC: 108 MMOL/L — HIGH (ref 98–107)
CHOLEST SERPL-MCNC: 137 MG/DL — SIGNIFICANT CHANGE UP
CHOLEST SERPL-MCNC: 137 MG/DL — SIGNIFICANT CHANGE UP
CK MB BLD-MCNC: 2.6 % — HIGH (ref 0–2.5)
CK MB BLD-MCNC: 2.6 % — HIGH (ref 0–2.5)
CK MB CFR SERPL CALC: 1.1 NG/ML — SIGNIFICANT CHANGE UP
CK MB CFR SERPL CALC: 1.1 NG/ML — SIGNIFICANT CHANGE UP
CK SERPL-CCNC: 43 U/L — SIGNIFICANT CHANGE UP (ref 25–170)
CK SERPL-CCNC: 43 U/L — SIGNIFICANT CHANGE UP (ref 25–170)
CO2 SERPL-SCNC: 24 MMOL/L — SIGNIFICANT CHANGE UP (ref 22–31)
CO2 SERPL-SCNC: 24 MMOL/L — SIGNIFICANT CHANGE UP (ref 22–31)
CREAT SERPL-MCNC: 0.39 MG/DL — LOW (ref 0.5–1.3)
CREAT SERPL-MCNC: 0.39 MG/DL — LOW (ref 0.5–1.3)
EGFR: 110 ML/MIN/1.73M2 — SIGNIFICANT CHANGE UP
EGFR: 110 ML/MIN/1.73M2 — SIGNIFICANT CHANGE UP
ESTIMATED AVERAGE GLUCOSE: 143 — SIGNIFICANT CHANGE UP
ESTIMATED AVERAGE GLUCOSE: 143 — SIGNIFICANT CHANGE UP
GLUCOSE BLDC GLUCOMTR-MCNC: 124 MG/DL — HIGH (ref 70–99)
GLUCOSE BLDC GLUCOMTR-MCNC: 124 MG/DL — HIGH (ref 70–99)
GLUCOSE SERPL-MCNC: 134 MG/DL — HIGH (ref 70–99)
GLUCOSE SERPL-MCNC: 134 MG/DL — HIGH (ref 70–99)
HCT VFR BLD CALC: 40.2 % — SIGNIFICANT CHANGE UP (ref 34.5–45)
HCT VFR BLD CALC: 40.2 % — SIGNIFICANT CHANGE UP (ref 34.5–45)
HDLC SERPL-MCNC: 38 MG/DL — LOW
HDLC SERPL-MCNC: 38 MG/DL — LOW
HGB BLD-MCNC: 13.1 G/DL — SIGNIFICANT CHANGE UP (ref 11.5–15.5)
HGB BLD-MCNC: 13.1 G/DL — SIGNIFICANT CHANGE UP (ref 11.5–15.5)
INR BLD: 1.83 RATIO — HIGH (ref 0.85–1.18)
INR BLD: 1.83 RATIO — HIGH (ref 0.85–1.18)
LIPID PNL WITH DIRECT LDL SERPL: 72 MG/DL — SIGNIFICANT CHANGE UP
LIPID PNL WITH DIRECT LDL SERPL: 72 MG/DL — SIGNIFICANT CHANGE UP
MAGNESIUM SERPL-MCNC: 1.8 MG/DL — SIGNIFICANT CHANGE UP (ref 1.6–2.6)
MAGNESIUM SERPL-MCNC: 1.8 MG/DL — SIGNIFICANT CHANGE UP (ref 1.6–2.6)
MCHC RBC-ENTMCNC: 29.9 PG — SIGNIFICANT CHANGE UP (ref 27–34)
MCHC RBC-ENTMCNC: 29.9 PG — SIGNIFICANT CHANGE UP (ref 27–34)
MCHC RBC-ENTMCNC: 32.6 GM/DL — SIGNIFICANT CHANGE UP (ref 32–36)
MCHC RBC-ENTMCNC: 32.6 GM/DL — SIGNIFICANT CHANGE UP (ref 32–36)
MCV RBC AUTO: 91.8 FL — SIGNIFICANT CHANGE UP (ref 80–100)
MCV RBC AUTO: 91.8 FL — SIGNIFICANT CHANGE UP (ref 80–100)
NON HDL CHOLESTEROL: 99 MG/DL — SIGNIFICANT CHANGE UP
NON HDL CHOLESTEROL: 99 MG/DL — SIGNIFICANT CHANGE UP
NRBC # BLD: 0 /100 WBCS — SIGNIFICANT CHANGE UP (ref 0–0)
NRBC # BLD: 0 /100 WBCS — SIGNIFICANT CHANGE UP (ref 0–0)
NRBC # FLD: 0 K/UL — SIGNIFICANT CHANGE UP (ref 0–0)
NRBC # FLD: 0 K/UL — SIGNIFICANT CHANGE UP (ref 0–0)
PHOSPHATE SERPL-MCNC: 2.6 MG/DL — SIGNIFICANT CHANGE UP (ref 2.5–4.5)
PHOSPHATE SERPL-MCNC: 2.6 MG/DL — SIGNIFICANT CHANGE UP (ref 2.5–4.5)
PLATELET # BLD AUTO: 210 K/UL — SIGNIFICANT CHANGE UP (ref 150–400)
PLATELET # BLD AUTO: 210 K/UL — SIGNIFICANT CHANGE UP (ref 150–400)
POTASSIUM SERPL-MCNC: 3.9 MMOL/L — SIGNIFICANT CHANGE UP (ref 3.5–5.3)
POTASSIUM SERPL-MCNC: 3.9 MMOL/L — SIGNIFICANT CHANGE UP (ref 3.5–5.3)
POTASSIUM SERPL-SCNC: 3.9 MMOL/L — SIGNIFICANT CHANGE UP (ref 3.5–5.3)
POTASSIUM SERPL-SCNC: 3.9 MMOL/L — SIGNIFICANT CHANGE UP (ref 3.5–5.3)
PROT SERPL-MCNC: 6.2 G/DL — SIGNIFICANT CHANGE UP (ref 6–8.3)
PROT SERPL-MCNC: 6.2 G/DL — SIGNIFICANT CHANGE UP (ref 6–8.3)
PROTHROM AB SERPL-ACNC: 20.3 SEC — HIGH (ref 9.5–13)
PROTHROM AB SERPL-ACNC: 20.3 SEC — HIGH (ref 9.5–13)
RBC # BLD: 4.38 M/UL — SIGNIFICANT CHANGE UP (ref 3.8–5.2)
RBC # BLD: 4.38 M/UL — SIGNIFICANT CHANGE UP (ref 3.8–5.2)
RBC # FLD: 13.3 % — SIGNIFICANT CHANGE UP (ref 10.3–14.5)
RBC # FLD: 13.3 % — SIGNIFICANT CHANGE UP (ref 10.3–14.5)
SODIUM SERPL-SCNC: 143 MMOL/L — SIGNIFICANT CHANGE UP (ref 135–145)
SODIUM SERPL-SCNC: 143 MMOL/L — SIGNIFICANT CHANGE UP (ref 135–145)
TRIGL SERPL-MCNC: 135 MG/DL — SIGNIFICANT CHANGE UP
TRIGL SERPL-MCNC: 135 MG/DL — SIGNIFICANT CHANGE UP
TROPONIN T, HIGH SENSITIVITY RESULT: 9 NG/L — SIGNIFICANT CHANGE UP
TROPONIN T, HIGH SENSITIVITY RESULT: 9 NG/L — SIGNIFICANT CHANGE UP
WBC # BLD: 5.05 K/UL — SIGNIFICANT CHANGE UP (ref 3.8–10.5)
WBC # BLD: 5.05 K/UL — SIGNIFICANT CHANGE UP (ref 3.8–10.5)
WBC # FLD AUTO: 5.05 K/UL — SIGNIFICANT CHANGE UP (ref 3.8–10.5)
WBC # FLD AUTO: 5.05 K/UL — SIGNIFICANT CHANGE UP (ref 3.8–10.5)

## 2023-10-31 PROCEDURE — 99254 IP/OBS CNSLTJ NEW/EST MOD 60: CPT

## 2023-10-31 PROCEDURE — 99223 1ST HOSP IP/OBS HIGH 75: CPT

## 2023-10-31 RX ORDER — WARFARIN SODIUM 2.5 MG/1
5 TABLET ORAL
Refills: 0 | Status: COMPLETED | OUTPATIENT
Start: 2023-10-31 | End: 2023-10-31

## 2023-10-31 RX ORDER — INSULIN LISPRO 100/ML
VIAL (ML) SUBCUTANEOUS
Refills: 0 | Status: DISCONTINUED | OUTPATIENT
Start: 2023-10-31 | End: 2023-11-03

## 2023-10-31 RX ORDER — MECLIZINE HCL 12.5 MG
25 TABLET ORAL EVERY 6 HOURS
Refills: 0 | Status: DISCONTINUED | OUTPATIENT
Start: 2023-10-31 | End: 2023-11-03

## 2023-10-31 RX ORDER — WARFARIN SODIUM 2.5 MG/1
5 TABLET ORAL
Refills: 0 | Status: DISCONTINUED | OUTPATIENT
Start: 2023-10-31 | End: 2023-10-31

## 2023-10-31 RX ORDER — DEXTROSE 50 % IN WATER 50 %
25 SYRINGE (ML) INTRAVENOUS ONCE
Refills: 0 | Status: DISCONTINUED | OUTPATIENT
Start: 2023-10-31 | End: 2023-11-03

## 2023-10-31 RX ORDER — METOPROLOL TARTRATE 50 MG
25 TABLET ORAL
Refills: 0 | Status: DISCONTINUED | OUTPATIENT
Start: 2023-10-31 | End: 2023-11-03

## 2023-10-31 RX ORDER — SODIUM CHLORIDE 9 MG/ML
1000 INJECTION, SOLUTION INTRAVENOUS
Refills: 0 | Status: DISCONTINUED | OUTPATIENT
Start: 2023-10-31 | End: 2023-11-03

## 2023-10-31 RX ORDER — INSULIN LISPRO 100/ML
VIAL (ML) SUBCUTANEOUS AT BEDTIME
Refills: 0 | Status: DISCONTINUED | OUTPATIENT
Start: 2023-10-31 | End: 2023-11-03

## 2023-10-31 RX ORDER — WARFARIN SODIUM 2.5 MG/1
1 TABLET ORAL
Refills: 0 | DISCHARGE

## 2023-10-31 RX ORDER — ACETAMINOPHEN 500 MG
650 TABLET ORAL EVERY 6 HOURS
Refills: 0 | Status: DISCONTINUED | OUTPATIENT
Start: 2023-10-31 | End: 2023-11-03

## 2023-10-31 RX ORDER — DEXTROSE 50 % IN WATER 50 %
15 SYRINGE (ML) INTRAVENOUS ONCE
Refills: 0 | Status: DISCONTINUED | OUTPATIENT
Start: 2023-10-31 | End: 2023-11-03

## 2023-10-31 RX ORDER — DEXTROSE 50 % IN WATER 50 %
12.5 SYRINGE (ML) INTRAVENOUS ONCE
Refills: 0 | Status: DISCONTINUED | OUTPATIENT
Start: 2023-10-31 | End: 2023-11-03

## 2023-10-31 RX ORDER — ATORVASTATIN CALCIUM 80 MG/1
20 TABLET, FILM COATED ORAL DAILY
Refills: 0 | Status: DISCONTINUED | OUTPATIENT
Start: 2023-10-31 | End: 2023-11-03

## 2023-10-31 RX ORDER — GLUCAGON INJECTION, SOLUTION 0.5 MG/.1ML
1 INJECTION, SOLUTION SUBCUTANEOUS ONCE
Refills: 0 | Status: DISCONTINUED | OUTPATIENT
Start: 2023-10-31 | End: 2023-11-03

## 2023-10-31 RX ORDER — ENOXAPARIN SODIUM 100 MG/ML
45 INJECTION SUBCUTANEOUS EVERY 12 HOURS
Refills: 0 | Status: DISCONTINUED | OUTPATIENT
Start: 2023-10-31 | End: 2023-11-01

## 2023-10-31 RX ORDER — ONDANSETRON 8 MG/1
4 TABLET, FILM COATED ORAL EVERY 8 HOURS
Refills: 0 | Status: DISCONTINUED | OUTPATIENT
Start: 2023-10-31 | End: 2023-11-03

## 2023-10-31 RX ORDER — DIGOXIN 250 MCG
250 TABLET ORAL DAILY
Refills: 0 | Status: DISCONTINUED | OUTPATIENT
Start: 2023-10-31 | End: 2023-11-01

## 2023-10-31 RX ORDER — INFLUENZA VIRUS VACCINE 15; 15; 15; 15 UG/.5ML; UG/.5ML; UG/.5ML; UG/.5ML
0.7 SUSPENSION INTRAMUSCULAR ONCE
Refills: 0 | Status: DISCONTINUED | OUTPATIENT
Start: 2023-10-31 | End: 2023-11-03

## 2023-10-31 RX ORDER — ATORVASTATIN CALCIUM 80 MG/1
1 TABLET, FILM COATED ORAL
Refills: 0 | DISCHARGE

## 2023-10-31 RX ORDER — LANOLIN ALCOHOL/MO/W.PET/CERES
3 CREAM (GRAM) TOPICAL AT BEDTIME
Refills: 0 | Status: DISCONTINUED | OUTPATIENT
Start: 2023-10-31 | End: 2023-11-03

## 2023-10-31 RX ORDER — SITAGLIPTIN AND METFORMIN HYDROCHLORIDE 500; 50 MG/1; MG/1
2 TABLET, FILM COATED ORAL
Refills: 0 | DISCHARGE

## 2023-10-31 RX ADMIN — Medication 250 MICROGRAM(S): at 11:32

## 2023-10-31 RX ADMIN — Medication 2: at 13:42

## 2023-10-31 RX ADMIN — WARFARIN SODIUM 5 MILLIGRAM(S): 2.5 TABLET ORAL at 21:47

## 2023-10-31 RX ADMIN — Medication 2.5 MILLIGRAM(S): at 13:09

## 2023-10-31 RX ADMIN — ENOXAPARIN SODIUM 45 MILLIGRAM(S): 100 INJECTION SUBCUTANEOUS at 11:32

## 2023-10-31 RX ADMIN — Medication 25 MILLIGRAM(S): at 11:32

## 2023-10-31 RX ADMIN — Medication 25 MILLIGRAM(S): at 21:47

## 2023-10-31 RX ADMIN — ATORVASTATIN CALCIUM 20 MILLIGRAM(S): 80 TABLET, FILM COATED ORAL at 11:32

## 2023-10-31 NOTE — H&P ADULT - PROBLEM SELECTOR PLAN 3
tele monitor with afib rate 90-100s  restart home Metoprolol and Digoxin.   can d/c tele if HR is controlled in 24hrs.

## 2023-10-31 NOTE — H&P ADULT - PROBLEM SELECTOR PLAN 1
CTA Head/Neck was unremarkable. no signs of bleed or ischemia.   symptoms improved in ED.  discussed with Dr. Figueroa from neurology, suspecting more likely Peripheral vertigo, but unable to get MRI due to mechanical valve.   no treatment changes recommended.   c/w warfarin.   PT/OT eval  outpatient vestibular rehab.

## 2023-10-31 NOTE — H&P ADULT - HISTORY OF PRESENT ILLNESS
68F with PMH Type 2 Diabetes, Mechanical Aortic valve repair, Mechanical Mitral valve repair, Afib presented with acute onset dizziness. Patient reports her symptoms started yesterday morning after she woke up. described as very unsteady. symptoms did not improve over the day, thus presented to ED. She had dizziness in the morning in the past, but usually disappears in minutes after getting up and moving around. The presenting symptom is very unusual for her. denied any ringing in the ear, no hearing loss. no other focal weakness or numbness. Patient is on warfarin for mechanical valve with INR goal 2.5 to 3.5. She usually follows up with her cardiologist every month for INR check, but she has not seen any doctor for the past 3 months. She has been compliant with her Warfarin doses, and there is no change in other medications or diet.     In ED, patient vss. labs were grossly unremarkable. A1c 6.6. INR 2.06 on presentation and repeat this morning down to 1.83.

## 2023-10-31 NOTE — H&P ADULT - PROBLEM SELECTOR PLAN 2
due to chronic Afib and mechanical aortic/mitral valve  INR goal is 2.5 to 3.5  last INR 1.8 this AM.   will start Lovenox 45mg bid  give Warfarin 5mg today, repeat INR daily.   will need close follow up with PCP or cardiologist to monitor INR level on discharge.  echo to evaluate valve function given unknown duration of subtherapeutic INR.

## 2023-10-31 NOTE — H&P ADULT - ASSESSMENT
68F w/ PMH Chronic Afib, Type 2 Diabetes, mechanical aortic and mitral valve replacements on Warfarin presented with acute onset dizziness iso subtherapeutic INR. admit for CVA evaluation.

## 2023-10-31 NOTE — PATIENT PROFILE ADULT - FALL HARM RISK - HARM RISK INTERVENTIONS
Assistance with ambulation/Assistance OOB with selected safe patient handling equipment/Communicate Risk of Fall with Harm to all staff/Monitor gait and stability/Reinforce activity limits and safety measures with patient and family/Sit up slowly, dangle for a short time, stand at bedside before walking/Tailored Fall Risk Interventions/Visual Cue: Yellow wristband and red socks/Bed in lowest position, wheels locked, appropriate side rails in place/Call bell, personal items and telephone in reach/Instruct patient to call for assistance before getting out of bed or chair/Non-slip footwear when patient is out of bed/[place] to call system/Physically safe environment - no spills, clutter or unnecessary equipment/Purposeful Proactive Rounding/Room/bathroom lighting operational, light cord in reach

## 2023-10-31 NOTE — H&P ADULT - NSHPPHYSICALEXAM_GEN_ALL_CORE
T(C): 36.7 (10-31-23 @ 08:42), Max: 36.8 (10-30-23 @ 12:41)  HR: 88 (10-31-23 @ 11:21) (73 - 97)  BP: 114/73 (10-31-23 @ 11:21) (114/73 - 158/78)  RR: 16 (10-31-23 @ 11:21) (16 - 18)  SpO2: 100% (10-31-23 @ 11:21) (98% - 100%)    GENERAL: no apparent distress, on room air  HEAD:  Atraumatic, Normocephalic  EYES: EOMI, PERRLA, conjunctiva and sclera clear b/l  NECK: No cervical lymphadenopathy; no obvious masses.   CHEST/LUNG: Clear to auscultation bilaterally; No wheezing or crackles  HEART: irregular tachycardic; S1 click in aortic and mitral area. no significant murmurs.  No peripheral edema  ABDOMEN: Soft, Nontender, Nondistended; Bowel sounds present  EXTREMITIES:  2+ Peripheral Pulses; No joint swelling.  PSYCH: normal affect, calm demeanor  NEUROLOGY: Awake and alert; CN 2-12 grossly intact; no obvious FND  SKIN: No rashes or lesions

## 2023-10-31 NOTE — H&P ADULT - NSICDXPASTSURGICALHX_GEN_ALL_CORE_FT
PAST SURGICAL HISTORY:  H/O mechanical aortic valve replacement     H/O mitral valve replacement with mechanical valve

## 2023-10-31 NOTE — H&P ADULT - NSHPREVIEWOFSYSTEMS_GEN_ALL_CORE
CONSTITUTIONAL: No fever; No chills; No night sweats; No weight loss; No fatigue  EYES: No eye pain; No blurry vision  ENMT:  No difficulty hearing; No sinus or throat pain  NECK: No pain or stiffness  RESPIRATORY: No cough; No wheezing; No hemoptysis; No shortness of breath; No sputum production  CARDIOVASCULAR: No chest pain; No palpitations; No leg swelling  GASTROINTESTINAL: No abdominal pain; No nausea; No vomiting; No hematemesis; No diarrhea; No constipation. No melena or hematochezia.  GENITOURINARY: No dysuria; No frequency; No hematuria; No incontinence  NEUROLOGICAL: No headaches; No loss of strength; No numbness; +dizziness.   SKIN: No itching/burning; No rashes or lesions   MUSCULOSKELETAL: No joint pain or swelling; No muscle, back, or extremity pain  HEME/LYMPH: No easy bruising, or bleeding gums

## 2023-10-31 NOTE — H&P ADULT - TIME BILLING
- Ordering, reviewing, and interpreting labs, testing, and imaging.  - Independently obtaining a review of systems and performing a physical exam  - Reviewing prior hospitalization and where necessary, outpatient records.  - Reviewing consultant recommendations/communicating with consultants  - Counselling and educating patient and family regarding interpretation of aforementioned items and plan of care.

## 2023-11-01 LAB
DIGOXIN SERPL-MCNC: 2.5 NG/ML — HIGH (ref 0.8–2)
DIGOXIN SERPL-MCNC: 2.5 NG/ML — HIGH (ref 0.8–2)
HCV AB S/CO SERPL IA: 0.06 S/CO — SIGNIFICANT CHANGE UP (ref 0–0.99)
HCV AB S/CO SERPL IA: 0.06 S/CO — SIGNIFICANT CHANGE UP (ref 0–0.99)
HCV AB SERPL-IMP: SIGNIFICANT CHANGE UP
HCV AB SERPL-IMP: SIGNIFICANT CHANGE UP
INR BLD: 2.52 RATIO — HIGH (ref 0.85–1.18)
INR BLD: 2.52 RATIO — HIGH (ref 0.85–1.18)
PROTHROM AB SERPL-ACNC: 27.5 SEC — HIGH (ref 9.5–13)
PROTHROM AB SERPL-ACNC: 27.5 SEC — HIGH (ref 9.5–13)

## 2023-11-01 PROCEDURE — 99233 SBSQ HOSP IP/OBS HIGH 50: CPT

## 2023-11-01 RX ORDER — WARFARIN SODIUM 2.5 MG/1
5 TABLET ORAL ONCE
Refills: 0 | Status: COMPLETED | OUTPATIENT
Start: 2023-11-01 | End: 2023-11-01

## 2023-11-01 RX ADMIN — ATORVASTATIN CALCIUM 20 MILLIGRAM(S): 80 TABLET, FILM COATED ORAL at 11:18

## 2023-11-01 RX ADMIN — WARFARIN SODIUM 5 MILLIGRAM(S): 2.5 TABLET ORAL at 21:58

## 2023-11-01 RX ADMIN — Medication 25 MILLIGRAM(S): at 11:21

## 2023-11-01 RX ADMIN — Medication 2.5 MILLIGRAM(S): at 05:33

## 2023-11-01 RX ADMIN — Medication 2: at 17:05

## 2023-11-01 RX ADMIN — Medication 2: at 22:59

## 2023-11-01 RX ADMIN — Medication 6: at 11:58

## 2023-11-01 RX ADMIN — Medication 250 MICROGRAM(S): at 05:33

## 2023-11-01 RX ADMIN — Medication 25 MILLIGRAM(S): at 17:57

## 2023-11-01 NOTE — DIETITIAN INITIAL EVALUATION ADULT - PERTINENT LABORATORY DATA
10-31    143  |  108<H>  |  12  ----------------------------<  134<H>  3.9   |  24  |  0.39<L>    Ca    8.7      31 Oct 2023 05:58  Phos  2.6     10-31  Mg     1.80     10-31    TPro  6.2  /  Alb  3.8  /  TBili  0.4  /  DBili  x   /  AST  23  /  ALT  28  /  AlkPhos  50  10-31  POCT Blood Glucose.: 189 mg/dL (11-01-23 @ 16:22)  A1C with Estimated Average Glucose Result: 6.6 % (10-31-23 @ 05:58)

## 2023-11-01 NOTE — OCCUPATIONAL THERAPY INITIAL EVALUATION ADULT - PRECAUTIONS/LIMITATIONS, REHAB EVAL
ORTHO PT NOTE    Treatment Session          Pt seen on 11ST nursing unit.                                                Frequency Comments: F (HB, MV)                                                                                                               Admitting complaint: Fall, initial encounter [W19.XXXA]  Closed displaced subtrochanteric fracture of right femur, initial encounter (CMS/[facility]) [S72.21XA]  Patient is Sikhism [Z78.9]                                          Precautions  Weight Bearing Status: Weight bearing as tolerated right lower extremity (11/16/17 1345)  Precautions Comments: WBAT s/p R ORIF (11/15/17 1017)    ASSESSMENT:       Patient's ambulation tolerance is 75 feet  Patient presents to physical therapy on POD 2 s/p  right femur ORIF.  Patient is demonstrating decreased range of motion, decreased strength, balance deficits, decreased activity tolerance, postural problems, decreased endurance which is limiting the completion of all functional mobility.  Further skilled physical therapy is required to address these limitations in attempt to maximize the patient's independence.  Requires minimal to moderate assist with increased time and verbal cues for balance and safety              Recommendation for Discharge: PT: Sub-acute nursing home        Recommendations for Discharge: OT:  (sub acute rehab)             PT/OT Mobility Equipment for Discharge: no anticipated needs, owns 2ww and 4ww, cane and quad cane (11/15/17 0820)  PT/OT ADL Equipment for Discharge: owns commode (11/15/17 0820)       Co-morbidities:   Patient Active Problem List   Diagnosis   • Facial cellulitis   • History of basal cell cancer   • bilateral oophorectomy for benign cysts - comment    • Menopause - comment    • Pap smear for cervical cancer screening   • History of colonoscopy   • Inverted  nipples - comment    • Chest wall pain - comment    • Abdominal bloating - comment    • Prolapse of female pelvic organs - comment    • Vulvar pain   • Unexplained weight loss - comment    • Breast pain, left - comment    • dense breast tissue without focal suspect change :  3-D/tomosynthesis would be appropriate for screening of breasts of this density - please schedule appropriately   • HZ (herpes zoster)           SUBJECTIVE: Patient's Personal Goal: to go home after rehab, wants to get back to \"normal\" (11/15/17 0820)  Subjective: agrees to session (11/16/17 1345)       OBJECTIVE:  Basic Lines: IV;Segura catheter (11/15/17 1115)  Safety Measures: Alarms (11/15/17 1115)    EDUCATION:   On this date, the patient was educated on safe mobility techniques.    The response to education was: Needs reinforcement    PT Identified Barriers to Discharge: limited mobility, entry steps     PLAN:   Continue skilled PT, including the following Treatment/Interventions: Functional transfer training;Endurance training;Bed mobility;Gait training (11/16/17 1345)   Frequency Comments: F (HB, MV) (11/16/17 1345)    Treatment Plan for Next Session: continue with bed mobility transfers gait to improve levels of independence  Additional Plan Considerations: 11/14: ORIF right femur for fx sustained from fall          Last 24 hours of Functional Data    CPM       Bed Mobility   Bed Mobility  Supine to Sit: Moderate Assist (Mod) (11/16/17 1345)  Bed Mobility Comments: assist with legs to edge of the bed trunk into upright and use of linen for scooting (11/16/17 1345)    Transfers  Transfers  Sit to Stand: Minimal Assist (Min) (11/16/17 1345)  Stand to Sit: Minimal Assist (Min) (11/16/17 1345)  Toilet Transfers: Minimal Assist (Min) (11/16/17 1345)  Assistive Device/: 2-wheeled walker (11/16/17 1345)  Transfer Comments 1: assist into standing and for controlled descent (11/16/17 1345)      Gait  Gait  Gait Assistance: Minimal  Assist (Min) (11/16/17 1345)  Assistive Device/: 2-wheeled walker (11/16/17 1345)  Ambulation Distance (Feet): 75 Feet (11/16/17 1345)  Pattern: Shuffle (11/16/17 1345)  Gait Comments 1: slower karely step to patterning verbal cues for step through pattern (11/16/17 1345)  Second Trial: No (11/16/17 1345),      Stairs  Stairs Mobility  Stair Management Assistance: Not attempted due to safety concerns (11/16/17 1345)    Wheelchair Mobility       Balance         Therapy Goals:    Goals  Short Term Goals to Be Reviewed On: 11/21/17 (11/15/17 0820)  Short Term Goals = Discharge Goals: Yes (11/15/17 0820)  Goal Agreement: Patient agrees with goals and treatment plan (11/15/17 0820)  Bed Mobility Discharge Goal: modified independent without rail (11/15/17 0820)  Transfer Discharge Goal: modified independent basic transfers (11/15/17 0820)  Ambulation Discharge Goal: 50 feet minimum for household distances, ww, WBAT, modified independent (11/15/17 0820)  Stairs Discharge Goal: stoop step with ww and supervision (11/15/17 0820)  Therapeutic Exercise Discharge Goal: demonstrate understanding of HEP (11/15/17 0820)        PT Time Spent: 40 minutes (11/16/17 1345)    See PT flowsheet for full details regarding the PT therapy provided.     fall precautions

## 2023-11-01 NOTE — DIETITIAN INITIAL EVALUATION ADULT - OTHER INFO
68F w/ PMH Chronic Afib, Type 2 Diabetes, mechanical aortic and mitral valve replacements on Warfarin presented with acute onset dizziness iso subtherapeutic INR. admit for CVA evaluation, per chart.     As per tray observation during visit, patient consumed >75% of lunch. Patient denies any difficulty chewing or swallowing, any GI distress (N/V/D/C) during visit. Reports last bowel movement 11/1. Current weight: 41.3kg/91lbs (10/31, per RN flow sheet). Compared to the reported weight of 94lbs, a year ago, noted patient has weight loss of -3lbs/-3%BW loss x 1 year. Labs reviewed, last HgA1c- 6.6% (10/31). Noted patient is on coumadin. RD provided nutrition education on vitamin k and medications, consistent carb nutrition therapy during visit. Also discussed small frequent meals, nutrient dense foods and snacks, oral nutritional supplements with patient. Patient is receptive to the information provided.

## 2023-11-01 NOTE — CONSULT NOTE ADULT - SUBJECTIVE AND OBJECTIVE BOX
CHIEF COMPLAINT:  Patient with chronic atrial fibrillation and asymptomatic pauses with dig level 2.5    HISTORY OF PRESENT ILLNESS:  68F with PMH Type 2 Diabetes, mechanical Aortic valve repair, mechanical Mitral valve repair, on Coumadin, chronic atrial fibrillation presented with acute onset dizziness. Patient reports her symptoms started on the day prior to admission in the morning after she woke up. She reports that she felt like she was loosing balance and unsteady on her feet and was afraid of falling. She presented to ED as her symptoms didn't improve. She reports history of dizziness in the morning in the past, but usually disappears in minutes after getting up and moving around. The presenting symptom was very unusual for her. Patient is on warfarin for mechanical valve with INR goal 2.5 to 3.5. She usually follows up with her cardiologist every month for INR check, but she has not seen any doctor for the past 3 months. She has been compliant with her Warfarin doses, and there is no change in other medications or diet. Her VSS were stable on admission and INR level 2.06 on presentation and down to 1.83 the next day. INR 2.52 today·  CTA Head/Neck was unremarkable, with no signs of bleed or ischemia. She was unable to get MRI due to mechanical valve. Patient was seen by PT/OT and was recommended for outpatient vestibular rehab. Patient's dizziness improved and she denies any dizziness at this time. EP was called today to evaluate patient for atrial fibrillation with pauses on telemetry. Review of telemetry shows atrial fibrillation with VR 60s-100s and VR 40s to 50s with pauses up to 2.1 sec during sleeping hours. Patient has history of chronic atrial fibrillation and was being managed on BB and digoxin. Her dig level is 2.5 today. Patient deneis any cehst pain, SOB, palpitations, dizziness, syncope or near syncope at this time. She denies any dizziness associated with pauses.     PAST MEDICAL & SURGICAL HISTORY:  T2DM (type 2 diabetes mellitus)  Chronic atrial fibrillation  H/O mitral valve replacement with mechanical valve  H/O mechanical aortic valve replacement    PREVIOUS DIAGNOSTIC TESTING:    Echocardiogram:  Pending  	    MEDICATIONS:  enalapril 2.5 milliGRAM(s) Oral daily  metoprolol tartrate 25 milliGRAM(s) Oral two times a day  warfarin 5 milliGRAM(s) Oral once  acetaminophen     Tablet .. 650 milliGRAM(s) Oral every 6 hours PRN  meclizine 25 milliGRAM(s) Oral every 6 hours PRN  melatonin 3 milliGRAM(s) Oral at bedtime PRN  ondansetron Injectable 4 milliGRAM(s) IV Push every 8 hours PRN  aluminum hydroxide/magnesium hydroxide/simethicone Suspension 30 milliLiter(s) Oral every 4 hours PRN  atorvastatin 20 milliGRAM(s) Oral daily  dextrose 50% Injectable 25 Gram(s) IV Push once  dextrose 50% Injectable 12.5 Gram(s) IV Push once  dextrose 50% Injectable 25 Gram(s) IV Push once  dextrose Oral Gel 15 Gram(s) Oral once PRN  glucagon  Injectable 1 milliGRAM(s) IntraMuscular once  insulin lispro (ADMELOG) corrective regimen sliding scale   SubCutaneous three times a day before meals  insulin lispro (ADMELOG) corrective regimen sliding scale   SubCutaneous at bedtime  dextrose 5%. 1000 milliLiter(s) IV Continuous <Continuous>  dextrose 5%. 1000 milliLiter(s) IV Continuous <Continuous>  influenza  Vaccine (HIGH DOSE) 0.7 milliLiter(s) IntraMuscular once      FAMILY HISTORY:  No pertinent family history in first degree relatives    SOCIAL HISTORY:      [-] Smoker  [-] Alcohol  [-] Drugs    Allergies    No Known Allergies    Intolerances    REVIEW OF SYSTEMS:  CONSTITUTIONAL: No fever, weight loss, or fatigue  EYES: No eye pain, visual disturbances, or discharge  ENMT:  No difficulty hearing, tinnitus, vertigo; No sinus or throat pain  NECK: No pain or stiffness  RESPIRATORY: No cough, wheezing, chills or hemoptysis; No Shortness of Breath  CARDIOVASCULAR: No chest pain, palpitations, passing out, dizziness, or leg swelling  GASTROINTESTINAL: No abdominal or epigastric pain. No nausea, vomiting, or hematemesis; No diarrhea or constipation. No melena or hematochezia.  GENITOURINARY: No dysuria, frequency, hematuria, or incontinence  NEUROLOGICAL: No headaches, memory loss, loss of strength, numbness, or tremors  SKIN: No itching, burning, rashes, or lesions   LYMPH Nodes: No enlarged glands  ENDOCRINE: No heat or cold intolerance; No hair loss  MUSCULOSKELETAL: No joint pain or swelling; No muscle, back, or extremity pain  PSYCHIATRIC: No depression, anxiety, mood swings, or difficulty sleeping  HEME/LYMPH: No easy bruising, or bleeding gums  ALLERY AND IMMUNOLOGIC: No hives or eczema	    [x] All others negative	  [ ] Unable to obtain    PHYSICAL EXAM:  T(C): 36.3 (11-01-23 @ 09:08), Max: 36.8 (10-31-23 @ 14:30)  HR: 79 (11-01-23 @ 09:08) (72 - 79)  BP: 106/58 (11-01-23 @ 09:08) (106/58 - 136/61)  RR: 18 (11-01-23 @ 09:08) (15 - 18)  SpO2: 97% (11-01-23 @ 09:08) (97% - 100%)  Wt(kg): --  I&O's Summary      Appearance: Normal	  Cardiovascular: Normal S1 S2, No JVD, No murmurs, No edema  Respiratory: Lungs clear to auscultation	  Psychiatry: A & O x 3, Mood & affect appropriate  Gastrointestinal:  Soft, Non-tender, + BS	  Extremities: Normal range of motion, No clubbing, cyanosis or edema      TELEMETRY: 	Atrial fibrillation with VR 50s-100s, 40s with pause up to 2.1 sec at night    ECG: Atrial fibrillation with VR 81 bpm; Qrs 86 ms; Qtc 378 ms  RADIOLOGY:  OTHER: 	  	  LABS:	 	    CARDIAC MARKERS:                        13.1   5.05  )-----------( 210      ( 31 Oct 2023 05:58 )             40.2     10-31    143  |  108<H>  |  12  ----------------------------<  134<H>  3.9   |  24  |  0.39<L>    Ca    8.7      31 Oct 2023 05:58  Phos  2.6     10-31  Mg     1.80     10-31    TPro  6.2  /  Alb  3.8  /  TBili  0.4  /  DBili  x   /  AST  23  /  ALT  28  /  AlkPhos  50  10-31      TSH: pending

## 2023-11-01 NOTE — PHYSICAL THERAPY INITIAL EVALUATION ADULT - GENERAL OBSERVATIONS, REHAB EVAL
Pt received seated at edge of bed, +telemetry, all lines intact, in NAD. Pt agreeable to participate in PT evaluation.

## 2023-11-01 NOTE — OCCUPATIONAL THERAPY INITIAL EVALUATION ADULT - PERTINENT HX OF CURRENT PROBLEM, REHAB EVAL
68 year old Female with history of type 2 diabetes, mitral valve repair, aortic valve repair on Coumadin, presenting with chief complaint of dizziness for the past 3 days. CT head shows no acute findings.

## 2023-11-01 NOTE — DIETITIAN INITIAL EVALUATION ADULT - NS FNS DIET ORDER
Diet, Regular:   Consistent Carbohydrate {Evening Snack} (CSTCHOSN)  DASH/TLC {Sodium & Cholesterol Restricted} (DASH) (10-31-23 @ 10:28)

## 2023-11-01 NOTE — PHYSICAL THERAPY INITIAL EVALUATION ADULT - LEVEL OF INDEPENDENCE: STAIR NEGOTIATION, REHAB EVAL
HISTORY OF PRESENT ILLNESS:  Saba 63 year old female  presents to clinic today for her annual exam and med follow up    She is a new grandma. Enjoying time with her Granddaughter.     CHIEF CONCERNS:    HTN: She is now managed with losartan 25 mg daily. She denies chest pain; shortness of breath or exertional changes.     Generalized anxiety/depression: she continues venlafaxine XR 75 mg daily and propranolol 10 mg now daily in the AM.     She reports daily hot flashes/sweating that she believes she may be due to the effexor but states benefits of med therapy make this tolerable.         HEALTH MAINTENANCE:  Health Maintenance   Topic Date Due   • COVID-19 Vaccine (1) Never done   • Shingles Vaccine (1 of 2) Never done   • Colorectal Cancer Screen-  Never done   • Influenza Vaccine (1) 2021   • Cervical Cancer Screen 30-64 -  09/15/2021   • Breast Cancer Screening  2021   • Depression Screening  10/06/2022   • DTaP/Tdap/Td Vaccine (3 - Td or Tdap) 2026   • Hepatitis C Screening  Completed   • Hepatitis B Vaccine  Aged Out   • Meningococcal Vaccine  Aged Out   • HPV Vaccine  Aged Out   • Pneumococcal Vaccine 0-64  Aged Out       GYNECOLOGIC HISTORY:  She is due for pap/gyn exam. She is now having pain with intercourse and states it is keeping her from having relations with her significant other.   She has had some spotting after intercourse because she feels like she is \"ripped\" she denies vaginal dryness  Postmenopausal ~8-10 years. She has  all born via      She has urinary pressure with intercourse as well.     CURRENT MEDICATIONS:  Current Outpatient Medications   Medication Sig Dispense Refill   • losartan (COZAAR) 25 MG tablet Take 1 tablet by mouth daily. 90 tablet 1   • venlafaxine XR (EFFEXOR XR) 75 MG 24 hr capsule Take 1 capsule by mouth daily. 90 capsule 1   • propranolol (INDERAL) 10 MG tablet Take 1 tablet by mouth 2 times daily. 60 tablet 2   • ALPRAZolam (XANAX) 0.25  MG tablet Take 1 tablet by mouth 3 times daily as needed for Sleep or Anxiety. 30 tablet 0   • Cholecalciferol (VITAMIN D) 2000 UNITS Cap Take 2 tablets by mouth daily. 5000 IU daily     • IBUPROFEN PO Take  by mouth.       No current facility-administered medications for this visit.        ALLERGY:  ALLERGIES:  Buspar [buspirone] and Morphine     PROBLEM LIST:  Patient Active Problem List   Diagnosis   • Generalized anxiety disorder   • After-cataract, obscuring vision   • Vitreous degeneration   • Strabismic amblyopia       FAMILY HISTORY:  Family History   Problem Relation Age of Onset   • Cataracts Mother    • Cancer Mother    • Cataracts Father    • Cataracts Brother    • Diabetes Brother    • Other Brother         [id number]   • Cancer Sister        REVIEW OF SYSTEMS:  Constitutional:  Denies fever or chills.  Eyes:  Denies change in visual acuity.   HENT:  Denies nasal congestion or sore throat.   Respiratory:  Denies cough or shortness of breath.   Cardiovascular:  Denies chest pain or edema.   Gastrointestinal:  Denies abdominal pain, nausea, vomiting, bloody stools or diarrhea.   Genitourinary:  Denies dysuria.   Musculoskeletal:  Denies back pain or joint pain.   Integument:  Denies rash.   Neurologic:  Denies headache, focal weakness or sensory changes.   Endocrine:  Denies polyuria or polydipsia.   Lymphatic:  Denies swollen glands.   Psychiatric:  Denies depression or anxiety.     OBJECTIVE:  Visit Vitals  BP (!) 146/90 (BP Location: LUE - Left upper extremity, Patient Position: Sitting, Cuff Size: Large Adult)   Pulse 60   Resp 16   Ht 5' 8\" (1.727 m)   Wt 94.8 kg (209 lb)   LMP 07/20/2013   BMI 31.78 kg/m²    Constitutional:  Well-developed, well-nourished, no acute distress, non-toxic appearance.  Eyes:  Pupils equal, round, reactive to light, conjunctivae normal.   HENT:  Atraumatic, external ears normal, nose normal, oropharynx moist, no pharyngeal exudates. Neck- normal range of motion, no  tenderness, supple.   Respiratory:  No respiratory distress, normal breath sounds, no rales, no wheezing.   Cardiovascular:  Normal rate, normal rhythm, no murmurs, no gallops, no rubs.   Gastrointestinal:  Soft, nondistended, normal bowel sounds, nontender, no organomegaly, no mass, no rebound, no guarding.   Breasts:  Bilaterally soft. No unusual masses, nodules or dimpling noted  Gynecologic: External genitalia is free of any unusual lesions or excoriations. Pap obtained, vaginal vault clean without unusual discharge. Endocervical canal sample. There is no cervical motion tenderness or friability. Pelvic revealed adnexae without mass or tenderness,uterus of normal size. Confirmed by bimanual exam.  Musculoskeletal:  No edema, no tenderness, no deformities. Back- no tenderness.  Integument:  Well-hydrated, no rash.   Lymphatic:  No lymphadenopathy noted.   Neurologic:  Alert and oriented times 3, cranial nerves 2-12 normal, normal motor function, normal sensory function, no focal deficits noted.   Psychiatric:  Speech and behavior appropriate.     Depression Screening:   Recent PHQ 2/9 Score    PHQ 2:  Date Adult PHQ 2 Score Adult PHQ 2 Interpretation   10/6/2021 0 No further screening needed       PHQ 9:         HEALTH MAINTENANCE:   Health maintenance was reviewed with patient today. Patient declined  Recommendations for vaccine and colon cancer screening due.  I explained the risks and benefits of the procedures.     ASSESSMENT:      1. Well woman exam with routine gynecological exam    2. Pap smear for cervical cancer screening    3. Encounter for screening mammogram for malignant neoplasm of breast    4. Generalized anxiety disorder    5. Essential hypertension    6. Mild major depression (CMS/HCC)        PLAN:  Discussed female health maintenance guidelines per standard protocol.  Orders Placed This Encounter   • MAMMO Screen w Efrain Bilateral   • Pap Test     Return in about 6 months (around 4/6/2022) for  med follow up .    Patient Instructions   Increase your losartan to 50 mg. For now we will have you take TWO of your 25 mg tablets  In 4 weeks will have you return for nurse visit bp check  Your vanlafaxine and propranolol are continued  Pap obtained. You will be called with results  Consider consult with Obgyn departement due to dyspareunia concerns.            supervision

## 2023-11-01 NOTE — DIETITIAN INITIAL EVALUATION ADULT - NSFNSPHYEXAMSKINFT_GEN_A_CORE
As per RN flow sheet, patient has surgical incision to midline chest, no pressure injury noted at this time.

## 2023-11-01 NOTE — CONSULT NOTE ADULT - NS ATTEND AMEND GEN_ALL_CORE FT
68 female with a PMHx of Type 2 Diabetes, mechanical Aortic valve repair, mechanical Mitral valve repair, on Coumadin, chronic atrial fibrillation presented with acute onset dizziness with concern for CVA. She was noted to have pauses of 2 to 2.5 seconds on tele. Dig level 2.5. Please stop dig. Check level in 72 hours. No indication for PPM at this time.

## 2023-11-01 NOTE — DIETITIAN INITIAL EVALUATION ADULT - ADD RECOMMEND
1. Nutrition department to provide Mighty shake reduced sugar 4oz 1x/day (200kcal, 7gm protein) and Magic Cup reduced sugar 4oz 2x/day (560kcal, 18gm protein) for nutrient support.  2. Monitor weight, labs, po intake and tolerance, bowel movement, skin integrity.   3. Encourage PO intake and honor food preferences as able.

## 2023-11-01 NOTE — DIETITIAN INITIAL EVALUATION ADULT - PERTINENT MEDS FT
MEDICATIONS  (STANDING):  atorvastatin 20 milliGRAM(s) Oral daily  dextrose 5%. 1000 milliLiter(s) (50 mL/Hr) IV Continuous <Continuous>  dextrose 5%. 1000 milliLiter(s) (100 mL/Hr) IV Continuous <Continuous>  dextrose 50% Injectable 25 Gram(s) IV Push once  dextrose 50% Injectable 12.5 Gram(s) IV Push once  dextrose 50% Injectable 25 Gram(s) IV Push once  enalapril 2.5 milliGRAM(s) Oral daily  glucagon  Injectable 1 milliGRAM(s) IntraMuscular once  influenza  Vaccine (HIGH DOSE) 0.7 milliLiter(s) IntraMuscular once  insulin lispro (ADMELOG) corrective regimen sliding scale   SubCutaneous three times a day before meals  insulin lispro (ADMELOG) corrective regimen sliding scale   SubCutaneous at bedtime  metoprolol tartrate 25 milliGRAM(s) Oral two times a day  warfarin 5 milliGRAM(s) Oral once    MEDICATIONS  (PRN):  acetaminophen     Tablet .. 650 milliGRAM(s) Oral every 6 hours PRN Temp greater or equal to 38C (100.4F), Mild Pain (1 - 3)  aluminum hydroxide/magnesium hydroxide/simethicone Suspension 30 milliLiter(s) Oral every 4 hours PRN Dyspepsia  dextrose Oral Gel 15 Gram(s) Oral once PRN Blood Glucose LESS THAN 70 milliGRAM(s)/deciliter  meclizine 25 milliGRAM(s) Oral every 6 hours PRN Dizziness  melatonin 3 milliGRAM(s) Oral at bedtime PRN Insomnia  ondansetron Injectable 4 milliGRAM(s) IV Push every 8 hours PRN Nausea and/or Vomiting

## 2023-11-01 NOTE — CONSULT NOTE ADULT - ASSESSMENT
68F with PMH Type 2 Diabetes, mechanical Aortic valve repair, mechanical Mitral valve repair, on Coumadin, chronic atrial fibrillation presented with acute onset dizziness. Patient reports her symptoms started on the day prior to admission in the morning after she woke up. She was admitted to r/o CVA. CT head was unremarkable. Unable to get MRI due to mechanical valve. Patient denies any dizziness at this time.  EP was called to evaluate patient for atral fibrillation with asymptomatic pauses during sleep in the setting of Dig level 2.5. Patient has chronic atrial fibrillation and is maintained on BB, Digoxin and Coumadin (mechanical MVR and AVR). INR subtherapeutic yesterday and now 2.52. No afib w/RVR noted. Patient's VR mostly 60s-80s.  - Continue to monitor on telemetry  - Maintain K>4 and Mg>2  - Dose Coumadin to goal INR 2.5-3.5  - Hold digoxin  - Continue BB and increase dose if needed   - Check TFTs  - Echocardiogram to evaluate LV function  - No pacing indications at this time  68F with PMH Type 2 Diabetes, mechanical Aortic valve repair, mechanical Mitral valve repair, on Coumadin, chronic atrial fibrillation presented with acute onset dizziness. Patient reports her symptoms started on the day prior to admission in the morning after she woke up. She was admitted to r/o CVA. CT head was unremarkable. Unable to get MRI due to mechanical valve. Patient denies any dizziness at this time.  EP was called to evaluate patient for atral fibrillation with asymptomatic pauses during sleep in the setting of Dig level 2.5. Patient has chronic atrial fibrillation and is maintained on BB, Digoxin and Coumadin (mechanical MVR and AVR). INR subtherapeutic yesterday and now 2.52. No afib w/RVR noted. Patient's VR mostly 60s-80s.  - Continue to monitor on telemetry  - Maintain K>4 and Mg>2  - Dose Coumadin to goal INR 2.5-3.5  - Hold digoxin and check Dig level after three days  - Continue BB and increase dose if needed   - Check TFTs  - Echocardiogram to evaluate LV function  - No pacing indications at this time

## 2023-11-01 NOTE — OCCUPATIONAL THERAPY INITIAL EVALUATION ADULT - ADDITIONAL COMMENTS
Patient lives in a private house with +4-5 steps to enter, +steps inside to second floor however patient resides on main level. Patient was independent with all functional mobility and ADL performance without use of an assistive device prior to admission.

## 2023-11-01 NOTE — PHYSICAL THERAPY INITIAL EVALUATION ADULT - ADDITIONAL COMMENTS
Pt lives in a private house with +4-5 steps to enter, +steps inside to second floor however pt resides on main level. Pt was independent with all functional mobility and ADL performance without use of an assistive device prior to admission.     Pt left seated at edge of bed, all lines intact, all needs in reach, in NAD. AZAM Castillo aware. Heart rate 90 beats per minute.

## 2023-11-01 NOTE — OCCUPATIONAL THERAPY INITIAL EVALUATION ADULT - NSOTDISCHREC_GEN_A_CORE
Patient is independent in ADLs and functional mobility. Patient will be discharged from this discipline at this time. Please reconsult with any changes./No skilled OT needs

## 2023-11-01 NOTE — DIETITIAN INITIAL EVALUATION ADULT - ORAL INTAKE PTA/DIET HISTORY
Patient reports usual body weight 121lbs in 2006, reports weighing 94-93lbs last year and gradually losing weight over the years. Patient tries to avoid sugar packets and food high in vitamin K PTA. Patient has no known food allergies.

## 2023-11-01 NOTE — OCCUPATIONAL THERAPY INITIAL EVALUATION ADULT - GENERAL OBSERVATIONS, REHAB EVAL
Patient received seated at EOB bed in NAD. +cardiac monitor. Patient's  bedside. Patient agrees to participate in OT evaluation. HR 79bpm . Patient left seated EOB in NAD. Patient tolerated OT evaluation well.

## 2023-11-01 NOTE — PHYSICAL THERAPY INITIAL EVALUATION ADULT - PERTINENT HX OF CURRENT PROBLEM, REHAB EVAL
68 year old Female with history of type 2 diabetes, mitral valve repair, aortic valve repair on Coumadin, presenting with chief complaint of dizziness for the past 3 days.

## 2023-11-02 LAB
ANION GAP SERPL CALC-SCNC: 11 MMOL/L — SIGNIFICANT CHANGE UP (ref 7–14)
ANION GAP SERPL CALC-SCNC: 11 MMOL/L — SIGNIFICANT CHANGE UP (ref 7–14)
APTT BLD: 47.2 SEC — HIGH (ref 24.5–35.6)
APTT BLD: 47.2 SEC — HIGH (ref 24.5–35.6)
BUN SERPL-MCNC: 18 MG/DL — SIGNIFICANT CHANGE UP (ref 7–23)
BUN SERPL-MCNC: 18 MG/DL — SIGNIFICANT CHANGE UP (ref 7–23)
CALCIUM SERPL-MCNC: 8.9 MG/DL — SIGNIFICANT CHANGE UP (ref 8.4–10.5)
CALCIUM SERPL-MCNC: 8.9 MG/DL — SIGNIFICANT CHANGE UP (ref 8.4–10.5)
CHLORIDE SERPL-SCNC: 103 MMOL/L — SIGNIFICANT CHANGE UP (ref 98–107)
CHLORIDE SERPL-SCNC: 103 MMOL/L — SIGNIFICANT CHANGE UP (ref 98–107)
CO2 SERPL-SCNC: 22 MMOL/L — SIGNIFICANT CHANGE UP (ref 22–31)
CO2 SERPL-SCNC: 22 MMOL/L — SIGNIFICANT CHANGE UP (ref 22–31)
CREAT SERPL-MCNC: 0.32 MG/DL — LOW (ref 0.5–1.3)
CREAT SERPL-MCNC: 0.32 MG/DL — LOW (ref 0.5–1.3)
DIGOXIN SERPL-MCNC: 0.7 NG/ML — LOW (ref 0.8–2)
DIGOXIN SERPL-MCNC: 0.7 NG/ML — LOW (ref 0.8–2)
EGFR: 115 ML/MIN/1.73M2 — SIGNIFICANT CHANGE UP
EGFR: 115 ML/MIN/1.73M2 — SIGNIFICANT CHANGE UP
GLUCOSE SERPL-MCNC: 173 MG/DL — HIGH (ref 70–99)
GLUCOSE SERPL-MCNC: 173 MG/DL — HIGH (ref 70–99)
HCT VFR BLD CALC: 42.9 % — SIGNIFICANT CHANGE UP (ref 34.5–45)
HCT VFR BLD CALC: 42.9 % — SIGNIFICANT CHANGE UP (ref 34.5–45)
HGB BLD-MCNC: 13.8 G/DL — SIGNIFICANT CHANGE UP (ref 11.5–15.5)
HGB BLD-MCNC: 13.8 G/DL — SIGNIFICANT CHANGE UP (ref 11.5–15.5)
INR BLD: 2.79 RATIO — HIGH (ref 0.85–1.18)
INR BLD: 2.79 RATIO — HIGH (ref 0.85–1.18)
MAGNESIUM SERPL-MCNC: 1.8 MG/DL — SIGNIFICANT CHANGE UP (ref 1.6–2.6)
MAGNESIUM SERPL-MCNC: 1.8 MG/DL — SIGNIFICANT CHANGE UP (ref 1.6–2.6)
MCHC RBC-ENTMCNC: 29.9 PG — SIGNIFICANT CHANGE UP (ref 27–34)
MCHC RBC-ENTMCNC: 29.9 PG — SIGNIFICANT CHANGE UP (ref 27–34)
MCHC RBC-ENTMCNC: 32.2 GM/DL — SIGNIFICANT CHANGE UP (ref 32–36)
MCHC RBC-ENTMCNC: 32.2 GM/DL — SIGNIFICANT CHANGE UP (ref 32–36)
MCV RBC AUTO: 93.1 FL — SIGNIFICANT CHANGE UP (ref 80–100)
MCV RBC AUTO: 93.1 FL — SIGNIFICANT CHANGE UP (ref 80–100)
NRBC # BLD: 0 /100 WBCS — SIGNIFICANT CHANGE UP (ref 0–0)
NRBC # BLD: 0 /100 WBCS — SIGNIFICANT CHANGE UP (ref 0–0)
NRBC # FLD: 0 K/UL — SIGNIFICANT CHANGE UP (ref 0–0)
NRBC # FLD: 0 K/UL — SIGNIFICANT CHANGE UP (ref 0–0)
PHOSPHATE SERPL-MCNC: 2.4 MG/DL — LOW (ref 2.5–4.5)
PHOSPHATE SERPL-MCNC: 2.4 MG/DL — LOW (ref 2.5–4.5)
PLATELET # BLD AUTO: 214 K/UL — SIGNIFICANT CHANGE UP (ref 150–400)
PLATELET # BLD AUTO: 214 K/UL — SIGNIFICANT CHANGE UP (ref 150–400)
POTASSIUM SERPL-MCNC: 3.9 MMOL/L — SIGNIFICANT CHANGE UP (ref 3.5–5.3)
POTASSIUM SERPL-MCNC: 3.9 MMOL/L — SIGNIFICANT CHANGE UP (ref 3.5–5.3)
POTASSIUM SERPL-SCNC: 3.9 MMOL/L — SIGNIFICANT CHANGE UP (ref 3.5–5.3)
POTASSIUM SERPL-SCNC: 3.9 MMOL/L — SIGNIFICANT CHANGE UP (ref 3.5–5.3)
PROTHROM AB SERPL-ACNC: 30.6 SEC — HIGH (ref 9.5–13)
PROTHROM AB SERPL-ACNC: 30.6 SEC — HIGH (ref 9.5–13)
RBC # BLD: 4.61 M/UL — SIGNIFICANT CHANGE UP (ref 3.8–5.2)
RBC # BLD: 4.61 M/UL — SIGNIFICANT CHANGE UP (ref 3.8–5.2)
RBC # FLD: 13.3 % — SIGNIFICANT CHANGE UP (ref 10.3–14.5)
RBC # FLD: 13.3 % — SIGNIFICANT CHANGE UP (ref 10.3–14.5)
SODIUM SERPL-SCNC: 136 MMOL/L — SIGNIFICANT CHANGE UP (ref 135–145)
SODIUM SERPL-SCNC: 136 MMOL/L — SIGNIFICANT CHANGE UP (ref 135–145)
T3FREE SERPL-MCNC: 2.52 PG/ML — SIGNIFICANT CHANGE UP (ref 2–4.4)
T3FREE SERPL-MCNC: 2.52 PG/ML — SIGNIFICANT CHANGE UP (ref 2–4.4)
T4 FREE SERPL-MCNC: 1.1 NG/DL — SIGNIFICANT CHANGE UP (ref 0.9–1.8)
T4 FREE SERPL-MCNC: 1.1 NG/DL — SIGNIFICANT CHANGE UP (ref 0.9–1.8)
TSH SERPL-MCNC: 3.65 UIU/ML — SIGNIFICANT CHANGE UP (ref 0.27–4.2)
TSH SERPL-MCNC: 3.65 UIU/ML — SIGNIFICANT CHANGE UP (ref 0.27–4.2)
WBC # BLD: 5.54 K/UL — SIGNIFICANT CHANGE UP (ref 3.8–10.5)
WBC # BLD: 5.54 K/UL — SIGNIFICANT CHANGE UP (ref 3.8–10.5)
WBC # FLD AUTO: 5.54 K/UL — SIGNIFICANT CHANGE UP (ref 3.8–10.5)
WBC # FLD AUTO: 5.54 K/UL — SIGNIFICANT CHANGE UP (ref 3.8–10.5)

## 2023-11-02 PROCEDURE — 99231 SBSQ HOSP IP/OBS SF/LOW 25: CPT

## 2023-11-02 RX ORDER — WARFARIN SODIUM 2.5 MG/1
5 TABLET ORAL ONCE
Refills: 0 | Status: COMPLETED | OUTPATIENT
Start: 2023-11-02 | End: 2023-11-02

## 2023-11-02 RX ORDER — SODIUM,POTASSIUM PHOSPHATES 278-250MG
1 POWDER IN PACKET (EA) ORAL THREE TIMES A DAY
Refills: 0 | Status: COMPLETED | OUTPATIENT
Start: 2023-11-02 | End: 2023-11-03

## 2023-11-02 RX ADMIN — WARFARIN SODIUM 5 MILLIGRAM(S): 2.5 TABLET ORAL at 21:30

## 2023-11-02 RX ADMIN — Medication 2: at 07:42

## 2023-11-02 RX ADMIN — Medication 25 MILLIGRAM(S): at 17:32

## 2023-11-02 RX ADMIN — Medication 25 MILLIGRAM(S): at 05:33

## 2023-11-02 RX ADMIN — ATORVASTATIN CALCIUM 20 MILLIGRAM(S): 80 TABLET, FILM COATED ORAL at 12:02

## 2023-11-02 RX ADMIN — Medication 8: at 12:01

## 2023-11-02 RX ADMIN — Medication 2: at 16:59

## 2023-11-02 RX ADMIN — Medication 2.5 MILLIGRAM(S): at 05:33

## 2023-11-02 RX ADMIN — Medication 1 TABLET(S): at 13:01

## 2023-11-02 RX ADMIN — Medication 2: at 21:30

## 2023-11-02 RX ADMIN — Medication 1 TABLET(S): at 21:30

## 2023-11-02 NOTE — PROGRESS NOTE ADULT - NUTRITIONAL ASSESSMENT
This patient has been assessed with a concern for Malnutrition and has been determined to have a diagnosis/diagnoses of Moderate protein-calorie malnutrition and Underweight (BMI < 19).    This patient is being managed with:   Diet Regular-  Consistent Carbohydrate {Evening Snack} (CSTCHOSN)  DASH/TLC {Sodium & Cholesterol Restricted} (DASH)  Entered: Oct 31 2023 10:27AM

## 2023-11-02 NOTE — PROGRESS NOTE ADULT - SUBJECTIVE AND OBJECTIVE BOX
Patient is seen and examined. Patient denies any chest pain, SOB, palpitations or dizziness    PAST MEDICAL & SURGICAL HISTORY:  T2DM (type 2 diabetes mellitus)    H/O aortic valve replacement    H/O mitral valve repair    H/O mitral valve repair    Chronic atrial fibrillation    H/O aortic valve replacement    H/O mitral valve repair    H/O mechanical aortic valve replacement    History of mitral valve replacement with mechanical valve    H/O mitral valve replacement with mechanical valve    H/O mechanical aortic valve replacement        MEDICATIONS  (STANDING):  atorvastatin 20 milliGRAM(s) Oral daily  dextrose 5%. 1000 milliLiter(s) (100 mL/Hr) IV Continuous <Continuous>  dextrose 5%. 1000 milliLiter(s) (50 mL/Hr) IV Continuous <Continuous>  dextrose 50% Injectable 25 Gram(s) IV Push once  dextrose 50% Injectable 25 Gram(s) IV Push once  dextrose 50% Injectable 12.5 Gram(s) IV Push once  enalapril 2.5 milliGRAM(s) Oral daily  glucagon  Injectable 1 milliGRAM(s) IntraMuscular once  influenza  Vaccine (HIGH DOSE) 0.7 milliLiter(s) IntraMuscular once  insulin lispro (ADMELOG) corrective regimen sliding scale   SubCutaneous three times a day before meals  insulin lispro (ADMELOG) corrective regimen sliding scale   SubCutaneous at bedtime  metoprolol tartrate 25 milliGRAM(s) Oral two times a day  potassium phosphate / sodium phosphate Tablet (K-PHOS No. 2) 1 Tablet(s) Oral three times a day  warfarin 5 milliGRAM(s) Oral once    MEDICATIONS  (PRN):  acetaminophen     Tablet .. 650 milliGRAM(s) Oral every 6 hours PRN Temp greater or equal to 38C (100.4F), Mild Pain (1 - 3)  aluminum hydroxide/magnesium hydroxide/simethicone Suspension 30 milliLiter(s) Oral every 4 hours PRN Dyspepsia  dextrose Oral Gel 15 Gram(s) Oral once PRN Blood Glucose LESS THAN 70 milliGRAM(s)/deciliter  meclizine 25 milliGRAM(s) Oral every 6 hours PRN Dizziness  melatonin 3 milliGRAM(s) Oral at bedtime PRN Insomnia  ondansetron Injectable 4 milliGRAM(s) IV Push every 8 hours PRN Nausea and/or Vomiting    Vital Signs Last 24 Hrs  T(C): 36.6 (02 Nov 2023 05:30), Max: 36.9 (01 Nov 2023 21:30)  T(F): 97.8 (02 Nov 2023 05:30), Max: 98.4 (01 Nov 2023 21:30)  HR: 78 (02 Nov 2023 05:30) (66 - 78)  BP: 134/57 (02 Nov 2023 05:30) (115/62 - 134/57)  BP(mean): --  RR: 18 (02 Nov 2023 05:30) (18 - 18)  SpO2: 98% (02 Nov 2023 05:30) (98% - 98%)    Parameters below as of 02 Nov 2023 05:30  Patient On (Oxygen Delivery Method): room air    INTERPRETATION OF TELEMETRY: Atrial fibrillation with VR 60s-80s; VR 40s during sleep and with 2.1 sec pause  LABS:                        13.8   5.54  )-----------( 214      ( 02 Nov 2023 08:49 )             42.9     11-02    136  |  103  |  18  ----------------------------<  173<H>  3.9   |  22  |  0.32<L>    Ca    8.9      02 Nov 2023 08:49  Phos  2.4     11-02  Mg     1.80     11-02          PT/INR - ( 02 Nov 2023 05:27 )   PT: 30.6 sec;   INR: 2.79 ratio         PTT - ( 02 Nov 2023 05:27 )  PTT:47.2 sec  Urinalysis Basic - ( 02 Nov 2023 08:49 )    Color: x / Appearance: x / SG: x / pH: x  Gluc: 173 mg/dL / Ketone: x  / Bili: x / Urobili: x   Blood: x / Protein: x / Nitrite: x   Leuk Esterase: x / RBC: x / WBC x   Sq Epi: x / Non Sq Epi: x / Bacteria: x      I&O's Summary    BNP  RADIOLOGY & ADDITIONAL STUDIES:      PHYSICAL EXAM:    GENERAL: In no apparent distress, well nourished, and hydrated.  HEART: Irregular rate and rhythm; No murmurs, rubs, or gallops.  PULMONARY: Clear to auscultation and percussion.  No rales, wheezing, or rhonchi bilaterally.  ABDOMEN: Soft, Nontender, Nondistended; Bowel sounds present  EXTREMITIES:  2+ Peripheral Pulses, No clubbing, cyanosis, or edema      
Recurrent pauses ~ 2-2.4 sec overnight, asymptomatic  Brief run rapid afib to 130s overnight, asymptomatic  Pt mentions her systolic reading in right arm was in the 90s, compared to 120s in the left arm  No pallor, cold sensation, paresthesia, weakness in right arm  Son @ bedside    Vital Signs Last 24 Hrs  T(C): 36.7 (02 Nov 2023 10:00), Max: 36.9 (01 Nov 2023 21:30)  T(F): 98.1 (02 Nov 2023 10:00), Max: 98.4 (01 Nov 2023 21:30)  HR: 64 (02 Nov 2023 10:00) (64 - 78)  BP: 139/77 (02 Nov 2023 10:00) (115/62 - 139/77)  BP(mean): --  RR: 18 (02 Nov 2023 10:00) (18 - 18)  SpO2: 100% (02 Nov 2023 10:00) (98% - 100%)    I&O's Summary        Vital Signs Last 24 Hrs  T(C): 36.3 (01 Nov 2023 09:08), Max: 36.8 (31 Oct 2023 14:30)  T(F): 97.4 (01 Nov 2023 09:08), Max: 98.2 (31 Oct 2023 14:30)  HR: 79 (01 Nov 2023 09:08) (72 - 79)  BP: 106/58 (01 Nov 2023 09:08) (106/58 - 136/61)  BP(mean): --  RR: 18 (01 Nov 2023 09:08) (15 - 18)  SpO2: 97% (01 Nov 2023 09:08) (97% - 100%)    I&O's Summary      Gen: NAD  Head: NCAT, EOMI  Lungs: CTA b/l  Heart: IRRR, no murmurs  Abd: soft, NTND, NABS  Neuro: A+O x 3, nonfocal  Exts: No pallor, coldness to touch, paresthesia, weakness in right arm; radial pulses equal b/l      LABS:                        13.8   5.54  )-----------( 214      ( 02 Nov 2023 08:49 )             42.9     11-02    136  |  103  |  18  ----------------------------<  173<H>  3.9   |  22  |  0.32<L>    Ca    8.9      02 Nov 2023 08:49  Phos  2.4     11-02  Mg     1.80     11-02      PT/INR - ( 02 Nov 2023 05:27 )   PT: 30.6 sec;   INR: 2.79 ratio         PTT - ( 02 Nov 2023 05:27 )  PTT:47.2 sec  CAPILLARY BLOOD GLUCOSE      POCT Blood Glucose.: 303 mg/dL (02 Nov 2023 11:20)  POCT Blood Glucose.: 165 mg/dL (02 Nov 2023 07:23)  POCT Blood Glucose.: 259 mg/dL (01 Nov 2023 22:54)  POCT Blood Glucose.: 189 mg/dL (01 Nov 2023 16:22)        Urinalysis Basic - ( 02 Nov 2023 08:49 )    Color: x / Appearance: x / SG: x / pH: x  Gluc: 173 mg/dL / Ketone: x  / Bili: x / Urobili: x   Blood: x / Protein: x / Nitrite: x   Leuk Esterase: x / RBC: x / WBC x   Sq Epi: x / Non Sq Epi: x / Bacteria: x        RADIOLOGY & ADDITIONAL TESTS:    Imaging Personally Reviewed:  [x] YES  [ ] NO    Case discussed with NPP:  [X] YES  [ ] NO  
Multiple pauses up to 2.9 sec overnight  No direct correlation w/symptoms as per pt  Spoke with Dr. Juan R Antonio, pt has been on dig 2.5 mcg daily for quite a while  NO recently new meds prescribed  Dizziness much improved   @ bedside      Vital Signs Last 24 Hrs  T(C): 36.3 (01 Nov 2023 09:08), Max: 36.8 (31 Oct 2023 14:30)  T(F): 97.4 (01 Nov 2023 09:08), Max: 98.2 (31 Oct 2023 14:30)  HR: 79 (01 Nov 2023 09:08) (72 - 79)  BP: 106/58 (01 Nov 2023 09:08) (106/58 - 136/61)  BP(mean): --  RR: 18 (01 Nov 2023 09:08) (15 - 18)  SpO2: 97% (01 Nov 2023 09:08) (97% - 100%)    I&O's Summary      Gen: NAD  Head: NCAT, EOMI  Lungs: CTA b/l  Heart: IRRR, no murmurs  Abd: soft, NTND, NABS  Neuro: A+O x 3, nonfocal    LABS:                        13.1   5.05  )-----------( 210      ( 31 Oct 2023 05:58 )             40.2     10-31    143  |  108<H>  |  12  ----------------------------<  134<H>  3.9   |  24  |  0.39<L>    Ca    8.7      31 Oct 2023 05:58  Phos  2.6     10-31  Mg     1.80     10-31    TPro  6.2  /  Alb  3.8  /  TBili  0.4  /  DBili  x   /  AST  23  /  ALT  28  /  AlkPhos  50  10-31    PT/INR - ( 01 Nov 2023 06:18 )   PT: 27.5 sec;   INR: 2.52 ratio         PTT - ( 31 Oct 2023 05:58 )  PTT:41.6 sec  CAPILLARY BLOOD GLUCOSE      POCT Blood Glucose.: 259 mg/dL (01 Nov 2023 11:25)  POCT Blood Glucose.: 253 mg/dL (01 Nov 2023 11:25)  POCT Blood Glucose.: 139 mg/dL (01 Nov 2023 07:43)  POCT Blood Glucose.: 177 mg/dL (31 Oct 2023 21:46)  POCT Blood Glucose.: 150 mg/dL (31 Oct 2023 17:01)  POCT Blood Glucose.: 161 mg/dL (31 Oct 2023 13:16)    CARDIAC MARKERS ( 31 Oct 2023 05:58 )  x     / x     / 43 U/L / x     / 1.1 ng/mL      Urinalysis Basic - ( 31 Oct 2023 05:58 )    Color: x / Appearance: x / SG: x / pH: x  Gluc: 134 mg/dL / Ketone: x  / Bili: x / Urobili: x   Blood: x / Protein: x / Nitrite: x   Leuk Esterase: x / RBC: x / WBC x   Sq Epi: x / Non Sq Epi: x / Bacteria: x        RADIOLOGY & ADDITIONAL TESTS:    Imaging Personally Reviewed:  [x] YES  [ ] NO    Case discussed with NPP:  [X] YES  [ ] NO

## 2023-11-02 NOTE — PROGRESS NOTE ADULT - NS ATTEND AMEND GEN_ALL_CORE FT
68 female with a PMHx of Type 2 Diabetes, mechanical Aortic valve repair, mechanical Mitral valve repair, on Coumadin, chronic atrial fibrillation presented with acute onset dizziness with concern for CVA. She was noted to have pauses of 2 to 2.5 seconds on tele. Dig level 2.5. Improved once Dig stopped. Continue BB. No indication for pacing. EP to sign off.

## 2023-11-02 NOTE — PROGRESS NOTE ADULT - ASSESSMENT
68F w/ PMH Chronic Afib, Type 2 Diabetes, mechanical aortic and mitral valve replacements on Warfarin presented with acute onset dizziness iso subtherapeutic INR. admit for CVA evaluation.       Problem/Plan - 1:  ·  Problem: Severe vertigo.   ·  Plan: CTA Head/Neck was unremarkable. no signs of bleed or ischemia.   symptoms improved in ED.  discussed with Dr. Figueroa from neurology, suspecting more likely Peripheral vertigo, but unable to get MRI due to mechanical valve.   no treatment changes recommended.   c/w warfarin home dosing  PT/OT eval  outpatient vestibular rehab.    Problem/Plan - 2:  ·  Problem: Hypercoagulable state.   ·  Plan: due to chronic Afib and mechanical aortic/mitral valve  INR goal is 2.5 to 3.5  stop lovenox 11/1/23  give warfarin as per home dosing  will need close follow up with PCP or cardiologist to monitor INR level on discharge.    Problem/Plan - 3:  ·  Problem: Chronic atrial fibrillation.   ·  Plan: tele monitor with afib rate 90-100s  restart home Metoprolol   multiple pauses on tele in setting of elevated dig level  repeat level in AM while holding digoxin  EP consult  f/u TTE    Problem/Plan - 4:  ·  Problem: T2DM (type 2 diabetes mellitus).   ·  Plan: hold oral agents  start moderate ISS  CC diet.   c/w Enalapril.    Problem/Plan - 5:  ·  Problem: Prophylactic measure.   ·  Plan: DVT: Therapeutic AC  Diet: Regular/DASH/DM  Dispo: PT/OT.  
68F w/ PMH Chronic Afib, Type 2 Diabetes, mechanical aortic and mitral valve replacements on Warfarin presented with acute onset dizziness iso subtherapeutic INR. admit for CVA evaluation.       Problem/Plan - 1:  ·  Problem: Severe vertigo.   ·  Plan: CTA Head/Neck was unremarkable. no signs of bleed or ischemia.   symptoms improved in ED.  discussed with Dr. Figueroa from neurology, suspecting more likely Peripheral vertigo, but unable to get MRI due to mechanical valve.   no treatment changes recommended.   c/w warfarin home dosing  PT/OT eval  outpatient vestibular rehab.    Problem/Plan - 2:  ·  Problem: Hypercoagulable state.   ·  Plan: due to chronic Afib and mechanical aortic/mitral valve  INR goal is 2.5 to 3.5  stopped lovenox 11/1/23  give warfarin as per home dosing  will need close follow up with PCP or cardiologist to monitor INR level on discharge.    Problem/Plan - 3:  ·  Problem: Chronic atrial fibrillation.   ·  Plan: tele monitor with afib rate 90-100s  cont home Metoprolol   multiple pauses on tele in setting of elevated dig level  repeat level 11/3/23 while holding digoxin  EP appreciated  TTE resulted    Problem/Plan - 4:  ·  Problem: T2DM (type 2 diabetes mellitus).   ·  Plan: hold oral agents  start moderate ISS  CC diet.   c/w Enalapril.    Problem/Plan - 5:  ·  Problem: Prophylactic measure.   ·  Plan: DVT: Therapeutic AC  Diet: Regular/DASH/DM  Dispo: PT/OT.  
68F with PMH Type 2 Diabetes, mechanical Aortic valve repair, mechanical Mitral valve repair, on Coumadin, chronic atrial fibrillation presented with acute onset dizziness. Patient reports her symptoms started on the day prior to admission in the morning after she woke up. She was admitted to r/o CVA. CT head was unremarkable. Unable to get MRI due to mechanical valve. Patient denies any dizziness at this time.  EP was called to evaluate patient for atral fibrillation with asymptomatic pauses during sleep. Patient has chronic atrial fibrillation and was maintained on BB, Digoxin and Coumadin (mechanical MVR and AVR). INR 2.79 today. No afib w/RVR noted. Patient's VR mostly 60s-80s, VR 40s with 2.1 sec observed during sleep. TSH 3.65 uIU/mL. Echocardiogram shows normal LV function with EF 65%  - Continue to monitor on telemetry  - Maintain K>4 and Mg>2  - Dose Coumadin to goal INR 2.5-3.5  - Continue BB and increase dose if needed   - No pacing indications at this time and EP will sign off. Please call back with any questions

## 2023-11-03 ENCOUNTER — TRANSCRIPTION ENCOUNTER (OUTPATIENT)
Age: 68
End: 2023-11-03

## 2023-11-03 VITALS
TEMPERATURE: 98 F | SYSTOLIC BLOOD PRESSURE: 99 MMHG | HEART RATE: 80 BPM | DIASTOLIC BLOOD PRESSURE: 52 MMHG | OXYGEN SATURATION: 99 % | RESPIRATION RATE: 18 BRPM

## 2023-11-03 LAB
ANION GAP SERPL CALC-SCNC: 10 MMOL/L — SIGNIFICANT CHANGE UP (ref 7–14)
ANION GAP SERPL CALC-SCNC: 10 MMOL/L — SIGNIFICANT CHANGE UP (ref 7–14)
APTT BLD: 49.9 SEC — HIGH (ref 24.5–35.6)
APTT BLD: 49.9 SEC — HIGH (ref 24.5–35.6)
BUN SERPL-MCNC: 15 MG/DL — SIGNIFICANT CHANGE UP (ref 7–23)
BUN SERPL-MCNC: 15 MG/DL — SIGNIFICANT CHANGE UP (ref 7–23)
CALCIUM SERPL-MCNC: 8.9 MG/DL — SIGNIFICANT CHANGE UP (ref 8.4–10.5)
CALCIUM SERPL-MCNC: 8.9 MG/DL — SIGNIFICANT CHANGE UP (ref 8.4–10.5)
CHLORIDE SERPL-SCNC: 104 MMOL/L — SIGNIFICANT CHANGE UP (ref 98–107)
CHLORIDE SERPL-SCNC: 104 MMOL/L — SIGNIFICANT CHANGE UP (ref 98–107)
CO2 SERPL-SCNC: 27 MMOL/L — SIGNIFICANT CHANGE UP (ref 22–31)
CO2 SERPL-SCNC: 27 MMOL/L — SIGNIFICANT CHANGE UP (ref 22–31)
CREAT SERPL-MCNC: 0.35 MG/DL — LOW (ref 0.5–1.3)
CREAT SERPL-MCNC: 0.35 MG/DL — LOW (ref 0.5–1.3)
DIGOXIN SERPL-MCNC: 0.8 NG/ML — SIGNIFICANT CHANGE UP (ref 0.8–2)
DIGOXIN SERPL-MCNC: 0.8 NG/ML — SIGNIFICANT CHANGE UP (ref 0.8–2)
EGFR: 113 ML/MIN/1.73M2 — SIGNIFICANT CHANGE UP
EGFR: 113 ML/MIN/1.73M2 — SIGNIFICANT CHANGE UP
GLUCOSE SERPL-MCNC: 187 MG/DL — HIGH (ref 70–99)
GLUCOSE SERPL-MCNC: 187 MG/DL — HIGH (ref 70–99)
HCT VFR BLD CALC: 40 % — SIGNIFICANT CHANGE UP (ref 34.5–45)
HCT VFR BLD CALC: 40 % — SIGNIFICANT CHANGE UP (ref 34.5–45)
HGB BLD-MCNC: 13.1 G/DL — SIGNIFICANT CHANGE UP (ref 11.5–15.5)
HGB BLD-MCNC: 13.1 G/DL — SIGNIFICANT CHANGE UP (ref 11.5–15.5)
MAGNESIUM SERPL-MCNC: 1.8 MG/DL — SIGNIFICANT CHANGE UP (ref 1.6–2.6)
MAGNESIUM SERPL-MCNC: 1.8 MG/DL — SIGNIFICANT CHANGE UP (ref 1.6–2.6)
MCHC RBC-ENTMCNC: 30.1 PG — SIGNIFICANT CHANGE UP (ref 27–34)
MCHC RBC-ENTMCNC: 30.1 PG — SIGNIFICANT CHANGE UP (ref 27–34)
MCHC RBC-ENTMCNC: 32.8 GM/DL — SIGNIFICANT CHANGE UP (ref 32–36)
MCHC RBC-ENTMCNC: 32.8 GM/DL — SIGNIFICANT CHANGE UP (ref 32–36)
MCV RBC AUTO: 92 FL — SIGNIFICANT CHANGE UP (ref 80–100)
MCV RBC AUTO: 92 FL — SIGNIFICANT CHANGE UP (ref 80–100)
NRBC # BLD: 0 /100 WBCS — SIGNIFICANT CHANGE UP (ref 0–0)
NRBC # BLD: 0 /100 WBCS — SIGNIFICANT CHANGE UP (ref 0–0)
NRBC # FLD: 0 K/UL — SIGNIFICANT CHANGE UP (ref 0–0)
NRBC # FLD: 0 K/UL — SIGNIFICANT CHANGE UP (ref 0–0)
PHOSPHATE SERPL-MCNC: 2.8 MG/DL — SIGNIFICANT CHANGE UP (ref 2.5–4.5)
PHOSPHATE SERPL-MCNC: 2.8 MG/DL — SIGNIFICANT CHANGE UP (ref 2.5–4.5)
PLATELET # BLD AUTO: 217 K/UL — SIGNIFICANT CHANGE UP (ref 150–400)
PLATELET # BLD AUTO: 217 K/UL — SIGNIFICANT CHANGE UP (ref 150–400)
POTASSIUM SERPL-MCNC: 3.8 MMOL/L — SIGNIFICANT CHANGE UP (ref 3.5–5.3)
POTASSIUM SERPL-MCNC: 3.8 MMOL/L — SIGNIFICANT CHANGE UP (ref 3.5–5.3)
POTASSIUM SERPL-SCNC: 3.8 MMOL/L — SIGNIFICANT CHANGE UP (ref 3.5–5.3)
POTASSIUM SERPL-SCNC: 3.8 MMOL/L — SIGNIFICANT CHANGE UP (ref 3.5–5.3)
RBC # BLD: 4.35 M/UL — SIGNIFICANT CHANGE UP (ref 3.8–5.2)
RBC # BLD: 4.35 M/UL — SIGNIFICANT CHANGE UP (ref 3.8–5.2)
RBC # FLD: 13.3 % — SIGNIFICANT CHANGE UP (ref 10.3–14.5)
RBC # FLD: 13.3 % — SIGNIFICANT CHANGE UP (ref 10.3–14.5)
SODIUM SERPL-SCNC: 141 MMOL/L — SIGNIFICANT CHANGE UP (ref 135–145)
SODIUM SERPL-SCNC: 141 MMOL/L — SIGNIFICANT CHANGE UP (ref 135–145)
WBC # BLD: 5.13 K/UL — SIGNIFICANT CHANGE UP (ref 3.8–10.5)
WBC # BLD: 5.13 K/UL — SIGNIFICANT CHANGE UP (ref 3.8–10.5)
WBC # FLD AUTO: 5.13 K/UL — SIGNIFICANT CHANGE UP (ref 3.8–10.5)
WBC # FLD AUTO: 5.13 K/UL — SIGNIFICANT CHANGE UP (ref 3.8–10.5)

## 2023-11-03 RX ORDER — WARFARIN SODIUM 2.5 MG/1
2.5 TABLET ORAL ONCE
Refills: 0 | Status: COMPLETED | OUTPATIENT
Start: 2023-11-03 | End: 2023-11-03

## 2023-11-03 RX ORDER — MECLIZINE HCL 12.5 MG
1 TABLET ORAL
Qty: 120 | Refills: 0
Start: 2023-11-03 | End: 2023-12-02

## 2023-11-03 RX ORDER — DIGOXIN 250 MCG
1 TABLET ORAL
Refills: 0 | DISCHARGE

## 2023-11-03 RX ADMIN — Medication 2.5 MILLIGRAM(S): at 05:36

## 2023-11-03 RX ADMIN — WARFARIN SODIUM 2.5 MILLIGRAM(S): 2.5 TABLET ORAL at 15:10

## 2023-11-03 RX ADMIN — Medication 25 MILLIGRAM(S): at 05:36

## 2023-11-03 RX ADMIN — Medication 4: at 11:49

## 2023-11-03 RX ADMIN — Medication 1 TABLET(S): at 05:36

## 2023-11-03 RX ADMIN — Medication 2: at 07:42

## 2023-11-03 NOTE — DISCHARGE NOTE PROVIDER - DETAILS OF MALNUTRITION DIAGNOSIS/DIAGNOSES
This patient has been assessed with a concern for Malnutrition and was treated during this hospitalization for the following Nutrition diagnosis/diagnoses:     -  11/01/2023: Moderate protein-calorie malnutrition   -  11/01/2023: Underweight (BMI < 19)

## 2023-11-03 NOTE — DISCHARGE NOTE NURSING/CASE MANAGEMENT/SOCIAL WORK - NSDCPEFALRISK_GEN_ALL_CORE
For information on Fall & Injury Prevention, visit: [email or web address] OR  [email or web address] OR  https://www.cdc.gov/steadi/patient.html

## 2023-11-03 NOTE — DISCHARGE NOTE NURSING/CASE MANAGEMENT/SOCIAL WORK - NSDCFUADDAPPT_GEN_ALL_CORE_FT
Outpatient follow up for vestibular Rehab for help with dizziness  [facility]- [phone] [facility] [phone]    Follow up with Dr. Antonio for INR monitoring of coumadin

## 2023-11-03 NOTE — DISCHARGE NOTE PROVIDER - NSDCCPCAREPLAN_GEN_ALL_CORE_FT
PRINCIPAL DISCHARGE DIAGNOSIS  Diagnosis: Dizziness  Assessment and Plan of Treatment: Meclizine as needed   Outpatient follow for vestibular rehab      SECONDARY DISCHARGE DIAGNOSES  Diagnosis: T2DM (type 2 diabetes mellitus)  Assessment and Plan of Treatment:     Diagnosis: Chronic atrial fibrillation  Assessment and Plan of Treatment:      PRINCIPAL DISCHARGE DIAGNOSIS  Diagnosis: Dizziness  Assessment and Plan of Treatment: - CT Head- No evidence of large territory infarct, intracranial hemorrhage, or mass effect  - CTA head/neck w/ con showed no proximal occlusion, aneurysm, dissection, flow limiting stenosis  - Neuro consulted -suspecting more likely Peripheral vertigo, but unable to get MRI due to mechanical valve  Meclizine as needed   Outpatient follow for vestibular rehab      SECONDARY DISCHARGE DIAGNOSES  Diagnosis: T2DM (type 2 diabetes mellitus)  Assessment and Plan of Treatment:     Diagnosis: Chronic atrial fibrillation  Assessment and Plan of Treatment:      PRINCIPAL DISCHARGE DIAGNOSIS  Diagnosis: Dizziness  Assessment and Plan of Treatment: - CT Head- No evidence of large territory infarct, intracranial hemorrhage, or mass effect  - CTA head/neck w/ con showed no proximal occlusion, aneurysm, dissection, flow limiting stenosis  - Neuro consulted -suspecting more likely Peripheral vertigo, but unable to get MRI due to mechanical valve  Meclizine as needed   Outpatient follow for vestibular rehab      SECONDARY DISCHARGE DIAGNOSES  Diagnosis: T2DM (type 2 diabetes mellitus)  Assessment and Plan of Treatment: resume home medications   Cosistant carbohydrate diet    Diagnosis: Chronic atrial fibrillation  Assessment and Plan of Treatment: Digoxin on hold   Call Dr Antonio for follow up appointment   If you have persistant palpations, chest pain, sob  or racing heart please return to ED for evaluation

## 2023-11-03 NOTE — DISCHARGE NOTE PROVIDER - CARE PROVIDER_API CALL
Juan R Antonio  Cardiovascular Disease  [address] [place] [address]  Phone: [phone]  Fax: [phone]  Follow Up Time:    Juan R Antonio  Cardiovascular Disease  [address] [place] [address]  Phone: [phone]  Fax: [phone]  Follow Up Time:     Christina Cohn  Internal Medicine  [address] [place] [address], [facility] [address]  [place] [address]  Phone: [phone]  Fax: [phone]  Follow Up Time:

## 2023-11-03 NOTE — DISCHARGE NOTE PROVIDER - NSDCFUADDAPPT_GEN_ALL_CORE_FT
Outpatient follow up for vestibular Rehab for help with dizziness  Outpatient follow up for vestibular Rehab for help with dizziness  [facility]- [phone] [phone]    Follow up with Dr. Antonio for INR monitoring of coumadin

## 2023-11-03 NOTE — DISCHARGE NOTE NURSING/CASE MANAGEMENT/SOCIAL WORK - PATIENT PORTAL LINK FT
You can access the FollowMyHealth Patient Portal offered by [facility] by registering at the following website: [email or web address]. By joining [facility]’s FollowMyHealth portal, you will also be able to view your health information using other applications (apps) compatible with our system.

## 2023-11-03 NOTE — DISCHARGE NOTE PROVIDER - NSDCMRMEDTOKEN_GEN_ALL_CORE_FT
atorvastatin 20 mg oral tablet: 1 tab(s) orally once a day  digoxin 250 mcg (0.25 mg) oral tablet: 1 tab(s) orally once a day  enalapril 2.5 mg oral tablet: 1 tab(s) orally once a day  Janumet 50 mg-1000 mg oral tablet: 2 tab(s) orally once a day  Jardiance 10 mg oral tablet: 1 tab(s) orally once a day  metoprolol tartrate 25 mg oral tablet: 1 tab(s) orally 2 times a day  warfarin 2.5 mg oral tablet: 1 tab(s) orally 2 tabs on M/W/F; 1 tab on Tu/Th/Sa/Su   atorvastatin 20 mg oral tablet: 1 tab(s) orally once a day  enalapril 2.5 mg oral tablet: 1 tab(s) orally once a day  Janumet 50 mg-1000 mg oral tablet: 2 tab(s) orally once a day  Jardiance 10 mg oral tablet: 1 tab(s) orally once a day  meclizine 25 mg oral tablet: 1 tab(s) orally every 6 hours as needed for Dizziness  metoprolol tartrate 25 mg oral tablet: 1 tab(s) orally 2 times a day  warfarin 2.5 mg oral tablet: 1 tab(s) orally 2 tabs on M/W/F; 1 tab on Tu/Th/Sa/Su

## 2023-11-03 NOTE — DISCHARGE NOTE PROVIDER - PROVIDER TOKENS
PROVIDER:[TOKEN:[[id number]]] PROVIDER:[TOKEN:[[id number]]],PROVIDER:[TOKEN:[[id number]]]

## 2023-11-03 NOTE — DISCHARGE NOTE PROVIDER - NSDCCPTREATMENT_GEN_ALL_CORE_FT
PRINCIPAL PROCEDURE  Procedure: CT head  Findings and Treatment: - CT Head- No evidence of large territory infarct, intracranial hemorrhage, or mass effect        SECONDARY PROCEDURE  Procedure: CTA head w/w/o contrast  Findings and Treatment: - CTA head/neck w/ con showed no proximal occlusion, aneurysm, dissection, flow limiting stenosis

## 2023-11-03 NOTE — DISCHARGE NOTE PROVIDER - HOSPITAL COURSE
68F w/ PMH Chronic Afib, Type 2 Diabetes, mechanical aortic and mitral valve replacements on Warfarin presented with acute onset dizziness iso subtherapeutic INR. admit for CVA evaluation.       Problem/Plan - 1:  ·  Problem: Severe vertigo.   ·  Plan: CTA Head/Neck was unremarkable. no signs of bleed or ischemia.   symptoms improved in ED.  discussed with Dr. Figueroa from neurology, suspecting more likely Peripheral vertigo, but unable to get MRI due to mechanical valve.   no treatment changes recommended.   c/w warfarin home dosing  PT/OT eval  outpatient vestibular rehab.    Problem/Plan - 2:  ·  Problem: Hypercoagulable state.   ·  Plan: due to chronic Afib and mechanical aortic/mitral valve  INR goal is 2.5 to 3.5  stopped lovenox 11/1/23  give warfarin as per home dosing  will need close follow up with PCP or cardiologist to monitor INR level on discharge  Pt aware to fu with Dr. Antonio for INR monitoring     Problem/Plan - 3:  ·  Problem: Chronic atrial fibrillation.   ·  Plan: tele monitor with afib rate 90-100s  cont home Metoprolol   multiple pauses on tele in setting of elevated dig level  repeat level 11/3/23 while holding digoxin  EP appreciated  TTE resulted  Digoxin level was 2.5 - EP consulted and digoxin on hold - pt will fu with Dr. Antonio   Pt advised to return to ED for any change /symptoms - chest pain /sob, palpatations    Problem/Plan - 4:  ·  Problem: T2DM (type 2 diabetes mellitus).   Return to home oral medications   CC diet.   c/w Enalapril.  Pt was seen by Dr. Stein and cleared for discharge Medications /follow up discussed   Dispo: Home with Outpatient follow up for vestibular rehab

## 2024-02-19 ENCOUNTER — INPATIENT (INPATIENT)
Facility: HOSPITAL | Age: 69
LOS: 8 days | Discharge: HOME | End: 2024-02-28
Attending: INTERNAL MEDICINE | Admitting: INTERNAL MEDICINE
Payer: COMMERCIAL

## 2024-02-19 VITALS
RESPIRATION RATE: 18 BRPM | OXYGEN SATURATION: 99 % | HEART RATE: 130 BPM | DIASTOLIC BLOOD PRESSURE: 61 MMHG | SYSTOLIC BLOOD PRESSURE: 90 MMHG | TEMPERATURE: 98 F

## 2024-02-19 DIAGNOSIS — Z95.2 PRESENCE OF PROSTHETIC HEART VALVE: Chronic | ICD-10-CM

## 2024-02-19 DIAGNOSIS — E11.10 TYPE 2 DIABETES MELLITUS WITH KETOACIDOSIS WITHOUT COMA: ICD-10-CM

## 2024-02-19 PROBLEM — I48.20 CHRONIC ATRIAL FIBRILLATION, UNSPECIFIED: Chronic | Status: ACTIVE | Noted: 2023-10-31

## 2024-02-19 PROBLEM — E11.9 TYPE 2 DIABETES MELLITUS WITHOUT COMPLICATIONS: Chronic | Status: ACTIVE | Noted: 2023-10-30

## 2024-02-19 LAB
ADD ON TEST-SPECIMEN IN LAB: SIGNIFICANT CHANGE UP
ALBUMIN SERPL ELPH-MCNC: 3.1 G/DL — LOW (ref 3.3–5)
ALBUMIN SERPL ELPH-MCNC: 3.2 G/DL — LOW (ref 3.3–5)
ALP SERPL-CCNC: 117 U/L — SIGNIFICANT CHANGE UP (ref 40–120)
ALP SERPL-CCNC: 90 U/L — SIGNIFICANT CHANGE UP (ref 40–120)
ALT FLD-CCNC: 67 U/L — HIGH (ref 4–33)
ALT FLD-CCNC: 77 U/L — HIGH (ref 4–33)
ANION GAP SERPL CALC-SCNC: 19 MMOL/L — HIGH (ref 7–14)
ANION GAP SERPL CALC-SCNC: 20 MMOL/L — HIGH (ref 7–14)
ANION GAP SERPL CALC-SCNC: 20 MMOL/L — HIGH (ref 7–14)
APPEARANCE UR: ABNORMAL
APTT BLD: 31.5 SEC — SIGNIFICANT CHANGE UP (ref 24.5–35.6)
AST SERPL-CCNC: 79 U/L — HIGH (ref 4–32)
AST SERPL-CCNC: 96 U/L — HIGH (ref 4–32)
B PERT DNA SPEC QL NAA+PROBE: SIGNIFICANT CHANGE UP
B PERT+PARAPERT DNA PNL SPEC NAA+PROBE: SIGNIFICANT CHANGE UP
B-OH-BUTYR SERPL-SCNC: 2.5 MMOL/L — HIGH (ref 0–0.4)
BACTERIA # UR AUTO: ABNORMAL /HPF
BASE EXCESS BLDV CALC-SCNC: -12.7 MMOL/L — LOW (ref -2–3)
BASE EXCESS BLDV CALC-SCNC: -13.5 MMOL/L — LOW (ref -2–3)
BASOPHILS # BLD AUTO: 0.09 K/UL — SIGNIFICANT CHANGE UP (ref 0–0.2)
BASOPHILS NFR BLD AUTO: 0.9 % — SIGNIFICANT CHANGE UP (ref 0–2)
BILIRUB SERPL-MCNC: 0.8 MG/DL — SIGNIFICANT CHANGE UP (ref 0.2–1.2)
BILIRUB SERPL-MCNC: 0.9 MG/DL — SIGNIFICANT CHANGE UP (ref 0.2–1.2)
BILIRUB UR-MCNC: NEGATIVE — SIGNIFICANT CHANGE UP
BLOOD GAS VENOUS COMPREHENSIVE RESULT: SIGNIFICANT CHANGE UP
BORDETELLA PARAPERTUSSIS (RAPRVP): SIGNIFICANT CHANGE UP
BUN SERPL-MCNC: 30 MG/DL — HIGH (ref 7–23)
BUN SERPL-MCNC: 32 MG/DL — HIGH (ref 7–23)
BUN SERPL-MCNC: 32 MG/DL — HIGH (ref 7–23)
C PNEUM DNA SPEC QL NAA+PROBE: SIGNIFICANT CHANGE UP
CALCIUM SERPL-MCNC: 7.7 MG/DL — LOW (ref 8.4–10.5)
CALCIUM SERPL-MCNC: 7.9 MG/DL — LOW (ref 8.4–10.5)
CALCIUM SERPL-MCNC: 8.4 MG/DL — SIGNIFICANT CHANGE UP (ref 8.4–10.5)
CAST: 92 /LPF — HIGH (ref 0–4)
CHLORIDE BLDV-SCNC: 101 MMOL/L — SIGNIFICANT CHANGE UP (ref 96–108)
CHLORIDE BLDV-SCNC: 102 MMOL/L — SIGNIFICANT CHANGE UP (ref 96–108)
CHLORIDE SERPL-SCNC: 100 MMOL/L — SIGNIFICANT CHANGE UP (ref 98–107)
CHLORIDE SERPL-SCNC: 102 MMOL/L — SIGNIFICANT CHANGE UP (ref 98–107)
CHLORIDE SERPL-SCNC: 99 MMOL/L — SIGNIFICANT CHANGE UP (ref 98–107)
CK MB CFR SERPL CALC: 2.7 NG/ML — SIGNIFICANT CHANGE UP
CO2 BLDV-SCNC: 15.9 MMOL/L — LOW (ref 22–26)
CO2 BLDV-SCNC: 17.1 MMOL/L — LOW (ref 22–26)
CO2 SERPL-SCNC: 13 MMOL/L — LOW (ref 22–31)
CO2 SERPL-SCNC: 15 MMOL/L — LOW (ref 22–31)
CO2 SERPL-SCNC: 15 MMOL/L — LOW (ref 22–31)
COLOR SPEC: YELLOW — SIGNIFICANT CHANGE UP
CREAT SERPL-MCNC: 0.84 MG/DL — SIGNIFICANT CHANGE UP (ref 0.5–1.3)
CREAT SERPL-MCNC: 0.84 MG/DL — SIGNIFICANT CHANGE UP (ref 0.5–1.3)
CREAT SERPL-MCNC: 0.86 MG/DL — SIGNIFICANT CHANGE UP (ref 0.5–1.3)
CRP SERPL-MCNC: 219.6 MG/L — HIGH
DIFF PNL FLD: ABNORMAL
DIGOXIN SERPL-MCNC: 0.5 NG/ML — LOW (ref 0.8–2)
EGFR: 74 ML/MIN/1.73M2 — SIGNIFICANT CHANGE UP
EGFR: 77 ML/MIN/1.73M2 — SIGNIFICANT CHANGE UP
EGFR: 77 ML/MIN/1.73M2 — SIGNIFICANT CHANGE UP
EOSINOPHIL # BLD AUTO: 0 K/UL — SIGNIFICANT CHANGE UP (ref 0–0.5)
EOSINOPHIL NFR BLD AUTO: 0 % — SIGNIFICANT CHANGE UP (ref 0–6)
FLUAV SUBTYP SPEC NAA+PROBE: SIGNIFICANT CHANGE UP
FLUBV RNA SPEC QL NAA+PROBE: SIGNIFICANT CHANGE UP
GAS PNL BLDV: 129 MMOL/L — LOW (ref 136–145)
GAS PNL BLDV: 129 MMOL/L — LOW (ref 136–145)
GLUCOSE BLDC GLUCOMTR-MCNC: 133 MG/DL — HIGH (ref 70–99)
GLUCOSE BLDC GLUCOMTR-MCNC: 156 MG/DL — HIGH (ref 70–99)
GLUCOSE BLDC GLUCOMTR-MCNC: 191 MG/DL — HIGH (ref 70–99)
GLUCOSE BLDV-MCNC: 249 MG/DL — HIGH (ref 70–99)
GLUCOSE BLDV-MCNC: 278 MG/DL — HIGH (ref 70–99)
GLUCOSE SERPL-MCNC: 201 MG/DL — HIGH (ref 70–99)
GLUCOSE SERPL-MCNC: 233 MG/DL — HIGH (ref 70–99)
GLUCOSE SERPL-MCNC: 246 MG/DL — HIGH (ref 70–99)
GLUCOSE UR QL: >=1000 MG/DL
HADV DNA SPEC QL NAA+PROBE: SIGNIFICANT CHANGE UP
HCO3 BLDV-SCNC: 15 MMOL/L — LOW (ref 22–29)
HCO3 BLDV-SCNC: 16 MMOL/L — LOW (ref 22–29)
HCOV 229E RNA SPEC QL NAA+PROBE: SIGNIFICANT CHANGE UP
HCOV HKU1 RNA SPEC QL NAA+PROBE: SIGNIFICANT CHANGE UP
HCOV NL63 RNA SPEC QL NAA+PROBE: SIGNIFICANT CHANGE UP
HCOV OC43 RNA SPEC QL NAA+PROBE: SIGNIFICANT CHANGE UP
HCT VFR BLD CALC: 36.5 % — SIGNIFICANT CHANGE UP (ref 34.5–45)
HCT VFR BLDA CALC: 39 % — SIGNIFICANT CHANGE UP (ref 34.5–46.5)
HCT VFR BLDA CALC: 41 % — SIGNIFICANT CHANGE UP (ref 34.5–46.5)
HGB BLD CALC-MCNC: 13 G/DL — SIGNIFICANT CHANGE UP (ref 11.7–16.1)
HGB BLD CALC-MCNC: 13.5 G/DL — SIGNIFICANT CHANGE UP (ref 11.7–16.1)
HGB BLD-MCNC: 11.3 G/DL — LOW (ref 11.5–15.5)
HMPV RNA SPEC QL NAA+PROBE: SIGNIFICANT CHANGE UP
HPIV1 RNA SPEC QL NAA+PROBE: SIGNIFICANT CHANGE UP
HPIV2 RNA SPEC QL NAA+PROBE: SIGNIFICANT CHANGE UP
HPIV3 RNA SPEC QL NAA+PROBE: SIGNIFICANT CHANGE UP
HPIV4 RNA SPEC QL NAA+PROBE: SIGNIFICANT CHANGE UP
IANC: 9.22 K/UL — HIGH (ref 1.8–7.4)
INR BLD: 2.94 RATIO — HIGH (ref 0.85–1.18)
KETONES UR-MCNC: ABNORMAL MG/DL
LACTATE BLDV-MCNC: 3.5 MMOL/L — HIGH (ref 0.5–2)
LACTATE BLDV-MCNC: 3.9 MMOL/L — HIGH (ref 0.5–2)
LEUKOCYTE ESTERASE UR-ACNC: ABNORMAL
LYMPHOCYTES # BLD AUTO: 0.25 K/UL — LOW (ref 1–3.3)
LYMPHOCYTES # BLD AUTO: 2.6 % — LOW (ref 13–44)
M PNEUMO DNA SPEC QL NAA+PROBE: SIGNIFICANT CHANGE UP
MAGNESIUM SERPL-MCNC: 1.7 MG/DL — SIGNIFICANT CHANGE UP (ref 1.6–2.6)
MCHC RBC-ENTMCNC: 29.3 PG — SIGNIFICANT CHANGE UP (ref 27–34)
MCHC RBC-ENTMCNC: 31 GM/DL — LOW (ref 32–36)
MCV RBC AUTO: 94.6 FL — SIGNIFICANT CHANGE UP (ref 80–100)
MONOCYTES # BLD AUTO: 0.09 K/UL — SIGNIFICANT CHANGE UP (ref 0–0.9)
MONOCYTES NFR BLD AUTO: 0.9 % — LOW (ref 2–14)
NEUTROPHILS # BLD AUTO: 9.29 K/UL — HIGH (ref 1.8–7.4)
NEUTROPHILS NFR BLD AUTO: 65.2 % — SIGNIFICANT CHANGE UP (ref 43–77)
NITRITE UR-MCNC: NEGATIVE — SIGNIFICANT CHANGE UP
PCO2 BLDV: 41 MMHG — SIGNIFICANT CHANGE UP (ref 39–52)
PCO2 BLDV: 45 MMHG — SIGNIFICANT CHANGE UP (ref 39–52)
PH BLDV: 7.15 — LOW (ref 7.32–7.43)
PH BLDV: 7.16 — LOW (ref 7.32–7.43)
PH UR: 5.5 — SIGNIFICANT CHANGE UP (ref 5–8)
PHOSPHATE SERPL-MCNC: 3.6 MG/DL — SIGNIFICANT CHANGE UP (ref 2.5–4.5)
PLATELET # BLD AUTO: 230 K/UL — SIGNIFICANT CHANGE UP (ref 150–400)
PO2 BLDV: 40 MMHG — SIGNIFICANT CHANGE UP (ref 25–45)
PO2 BLDV: 50 MMHG — HIGH (ref 25–45)
POTASSIUM BLDV-SCNC: 4.5 MMOL/L — SIGNIFICANT CHANGE UP (ref 3.5–5.1)
POTASSIUM BLDV-SCNC: 5 MMOL/L — SIGNIFICANT CHANGE UP (ref 3.5–5.1)
POTASSIUM SERPL-MCNC: 4.7 MMOL/L — SIGNIFICANT CHANGE UP (ref 3.5–5.3)
POTASSIUM SERPL-MCNC: 4.7 MMOL/L — SIGNIFICANT CHANGE UP (ref 3.5–5.3)
POTASSIUM SERPL-MCNC: 4.9 MMOL/L — SIGNIFICANT CHANGE UP (ref 3.5–5.3)
POTASSIUM SERPL-SCNC: 4.7 MMOL/L — SIGNIFICANT CHANGE UP (ref 3.5–5.3)
POTASSIUM SERPL-SCNC: 4.7 MMOL/L — SIGNIFICANT CHANGE UP (ref 3.5–5.3)
POTASSIUM SERPL-SCNC: 4.9 MMOL/L — SIGNIFICANT CHANGE UP (ref 3.5–5.3)
PROT SERPL-MCNC: 6.3 G/DL — SIGNIFICANT CHANGE UP (ref 6–8.3)
PROT SERPL-MCNC: 6.7 G/DL — SIGNIFICANT CHANGE UP (ref 6–8.3)
PROT UR-MCNC: 300 MG/DL
PROTHROM AB SERPL-ACNC: 32.2 SEC — HIGH (ref 9.5–13)
RAPID RVP RESULT: SIGNIFICANT CHANGE UP
RBC # BLD: 3.86 M/UL — SIGNIFICANT CHANGE UP (ref 3.8–5.2)
RBC # FLD: 14.8 % — HIGH (ref 10.3–14.5)
RBC CASTS # UR COMP ASSIST: 57 /HPF — HIGH (ref 0–4)
REVIEW: SIGNIFICANT CHANGE UP
RSV RNA SPEC QL NAA+PROBE: SIGNIFICANT CHANGE UP
RV+EV RNA SPEC QL NAA+PROBE: SIGNIFICANT CHANGE UP
SAO2 % BLDV: 68.4 % — SIGNIFICANT CHANGE UP (ref 67–88)
SAO2 % BLDV: 74.5 % — SIGNIFICANT CHANGE UP (ref 67–88)
SARS-COV-2 RNA SPEC QL NAA+PROBE: SIGNIFICANT CHANGE UP
SODIUM SERPL-SCNC: 134 MMOL/L — LOW (ref 135–145)
SODIUM SERPL-SCNC: 134 MMOL/L — LOW (ref 135–145)
SODIUM SERPL-SCNC: 135 MMOL/L — SIGNIFICANT CHANGE UP (ref 135–145)
SP GR SPEC: 1.02 — SIGNIFICANT CHANGE UP (ref 1–1.03)
SQUAMOUS # UR AUTO: 3 /HPF — SIGNIFICANT CHANGE UP (ref 0–5)
TROPONIN T, HIGH SENSITIVITY RESULT: 103 NG/L — CRITICAL HIGH
TROPONIN T, HIGH SENSITIVITY RESULT: 98 NG/L — CRITICAL HIGH
TSH SERPL-MCNC: 2.5 UIU/ML — SIGNIFICANT CHANGE UP (ref 0.27–4.2)
UROBILINOGEN FLD QL: 1 MG/DL — SIGNIFICANT CHANGE UP (ref 0.2–1)
WBC # BLD: 9.72 K/UL — SIGNIFICANT CHANGE UP (ref 3.8–10.5)
WBC # FLD AUTO: 9.72 K/UL — SIGNIFICANT CHANGE UP (ref 3.8–10.5)
WBC UR QL: 162 /HPF — HIGH (ref 0–5)

## 2024-02-19 PROCEDURE — 99291 CRITICAL CARE FIRST HOUR: CPT

## 2024-02-19 PROCEDURE — 71045 X-RAY EXAM CHEST 1 VIEW: CPT | Mod: 26

## 2024-02-19 PROCEDURE — 99291 CRITICAL CARE FIRST HOUR: CPT | Mod: GC

## 2024-02-19 PROCEDURE — 93010 ELECTROCARDIOGRAM REPORT: CPT

## 2024-02-19 RX ORDER — ACETAMINOPHEN 500 MG
650 TABLET ORAL EVERY 6 HOURS
Refills: 0 | Status: DISCONTINUED | OUTPATIENT
Start: 2024-02-19 | End: 2024-02-20

## 2024-02-19 RX ORDER — PHENYLEPHRINE HYDROCHLORIDE 10 MG/ML
1 INJECTION INTRAVENOUS
Qty: 160 | Refills: 0 | Status: DISCONTINUED | OUTPATIENT
Start: 2024-02-19 | End: 2024-02-21

## 2024-02-19 RX ORDER — METOPROLOL TARTRATE 50 MG
5 TABLET ORAL ONCE
Refills: 0 | Status: COMPLETED | OUTPATIENT
Start: 2024-02-19 | End: 2024-02-19

## 2024-02-19 RX ORDER — AZITHROMYCIN 500 MG/1
500 TABLET, FILM COATED ORAL ONCE
Refills: 0 | Status: COMPLETED | OUTPATIENT
Start: 2024-02-19 | End: 2024-02-19

## 2024-02-19 RX ORDER — VANCOMYCIN HCL 1 G
1000 VIAL (EA) INTRAVENOUS ONCE
Refills: 0 | Status: DISCONTINUED | OUTPATIENT
Start: 2024-02-19 | End: 2024-02-19

## 2024-02-19 RX ORDER — INSULIN HUMAN 100 [IU]/ML
1 INJECTION, SOLUTION SUBCUTANEOUS
Qty: 100 | Refills: 0 | Status: DISCONTINUED | OUTPATIENT
Start: 2024-02-19 | End: 2024-02-20

## 2024-02-19 RX ORDER — MAGNESIUM SULFATE 500 MG/ML
2 VIAL (ML) INJECTION ONCE
Refills: 0 | Status: COMPLETED | OUTPATIENT
Start: 2024-02-19 | End: 2024-02-19

## 2024-02-19 RX ORDER — ACETAMINOPHEN 500 MG
1000 TABLET ORAL ONCE
Refills: 0 | Status: COMPLETED | OUTPATIENT
Start: 2024-02-19 | End: 2024-02-19

## 2024-02-19 RX ORDER — SODIUM CHLORIDE 9 MG/ML
1000 INJECTION INTRAMUSCULAR; INTRAVENOUS; SUBCUTANEOUS ONCE
Refills: 0 | Status: COMPLETED | OUTPATIENT
Start: 2024-02-19 | End: 2024-02-19

## 2024-02-19 RX ORDER — WARFARIN SODIUM 2.5 MG/1
2.5 TABLET ORAL ONCE
Refills: 0 | Status: COMPLETED | OUTPATIENT
Start: 2024-02-19 | End: 2024-02-19

## 2024-02-19 RX ORDER — CEFTRIAXONE 500 MG/1
1000 INJECTION, POWDER, FOR SOLUTION INTRAMUSCULAR; INTRAVENOUS ONCE
Refills: 0 | Status: COMPLETED | OUTPATIENT
Start: 2024-02-19 | End: 2024-02-19

## 2024-02-19 RX ORDER — DIGOXIN 250 MCG
125 TABLET ORAL ONCE
Refills: 0 | Status: COMPLETED | OUTPATIENT
Start: 2024-02-19 | End: 2024-02-19

## 2024-02-19 RX ORDER — METOPROLOL TARTRATE 50 MG
25 TABLET ORAL ONCE
Refills: 0 | Status: COMPLETED | OUTPATIENT
Start: 2024-02-19 | End: 2024-02-19

## 2024-02-19 RX ORDER — SODIUM CHLORIDE 9 MG/ML
1000 INJECTION, SOLUTION INTRAVENOUS
Refills: 0 | Status: DISCONTINUED | OUTPATIENT
Start: 2024-02-19 | End: 2024-02-20

## 2024-02-19 RX ORDER — CEFTRIAXONE 500 MG/1
1000 INJECTION, POWDER, FOR SOLUTION INTRAMUSCULAR; INTRAVENOUS ONCE
Refills: 0 | Status: DISCONTINUED | OUTPATIENT
Start: 2024-02-19 | End: 2024-02-20

## 2024-02-19 RX ORDER — PHENYLEPHRINE HYDROCHLORIDE 10 MG/ML
300 INJECTION INTRAVENOUS ONCE
Refills: 0 | Status: COMPLETED | OUTPATIENT
Start: 2024-02-19 | End: 2024-02-19

## 2024-02-19 RX ORDER — PIPERACILLIN AND TAZOBACTAM 4; .5 G/20ML; G/20ML
3.38 INJECTION, POWDER, LYOPHILIZED, FOR SOLUTION INTRAVENOUS ONCE
Refills: 0 | Status: DISCONTINUED | OUTPATIENT
Start: 2024-02-19 | End: 2024-02-19

## 2024-02-19 RX ADMIN — Medication 25 GRAM(S): at 21:00

## 2024-02-19 RX ADMIN — INSULIN HUMAN 4.25 UNIT(S)/HR: 100 INJECTION, SOLUTION SUBCUTANEOUS at 21:18

## 2024-02-19 RX ADMIN — INSULIN HUMAN 4.25 UNIT(S)/HR: 100 INJECTION, SOLUTION SUBCUTANEOUS at 20:10

## 2024-02-19 RX ADMIN — WARFARIN SODIUM 2.5 MILLIGRAM(S): 2.5 TABLET ORAL at 22:23

## 2024-02-19 RX ADMIN — Medication 125 MICROGRAM(S): at 22:23

## 2024-02-19 RX ADMIN — PHENYLEPHRINE HYDROCHLORIDE 7.97 MICROGRAM(S)/KG/MIN: 10 INJECTION INTRAVENOUS at 21:01

## 2024-02-19 RX ADMIN — Medication 5 MILLIGRAM(S): at 16:24

## 2024-02-19 RX ADMIN — SODIUM CHLORIDE 1000 MILLILITER(S): 9 INJECTION INTRAMUSCULAR; INTRAVENOUS; SUBCUTANEOUS at 20:10

## 2024-02-19 RX ADMIN — Medication 5 MILLIGRAM(S): at 21:01

## 2024-02-19 RX ADMIN — SODIUM CHLORIDE 1000 MILLILITER(S): 9 INJECTION INTRAMUSCULAR; INTRAVENOUS; SUBCUTANEOUS at 14:58

## 2024-02-19 RX ADMIN — INSULIN HUMAN 3 UNIT(S)/HR: 100 INJECTION, SOLUTION SUBCUTANEOUS at 22:31

## 2024-02-19 RX ADMIN — PHENYLEPHRINE HYDROCHLORIDE 300 MICROGRAM(S): 10 INJECTION INTRAVENOUS at 16:13

## 2024-02-19 RX ADMIN — SODIUM CHLORIDE 1000 MILLILITER(S): 9 INJECTION INTRAMUSCULAR; INTRAVENOUS; SUBCUTANEOUS at 15:28

## 2024-02-19 RX ADMIN — Medication 200 MILLIGRAM(S): at 21:45

## 2024-02-19 RX ADMIN — INSULIN HUMAN 2 UNIT(S)/HR: 100 INJECTION, SOLUTION SUBCUTANEOUS at 23:32

## 2024-02-19 RX ADMIN — AZITHROMYCIN 255 MILLIGRAM(S): 500 TABLET, FILM COATED ORAL at 16:15

## 2024-02-19 RX ADMIN — Medication 400 MILLIGRAM(S): at 14:58

## 2024-02-19 RX ADMIN — Medication 25 MILLIGRAM(S): at 19:46

## 2024-02-19 RX ADMIN — PHENYLEPHRINE HYDROCHLORIDE 7.97 MICROGRAM(S)/KG/MIN: 10 INJECTION INTRAVENOUS at 17:30

## 2024-02-19 RX ADMIN — INSULIN HUMAN 4.25 UNIT(S)/HR: 100 INJECTION, SOLUTION SUBCUTANEOUS at 21:01

## 2024-02-19 RX ADMIN — SODIUM CHLORIDE 100 MILLILITER(S): 9 INJECTION, SOLUTION INTRAVENOUS at 21:01

## 2024-02-19 RX ADMIN — CEFTRIAXONE 100 MILLIGRAM(S): 500 INJECTION, POWDER, FOR SOLUTION INTRAMUSCULAR; INTRAVENOUS at 15:20

## 2024-02-19 NOTE — ED PROVIDER NOTE - CARE PLAN
Principal Discharge DX:	Diabetic ketoacidosis  Secondary Diagnosis:	Sepsis  Secondary Diagnosis:	Atrial fibrillation with RVR  Secondary Diagnosis:	Acute UTI   1

## 2024-02-19 NOTE — ED ADULT NURSE NOTE - OBJECTIVE STATEMENT
patient alert and oriented x4 ambulatory c/o cough x2 weeks. patient denies any difficulty breathing but endorses fevers, has been taking tylenol. denies chest pain. states in november she was hospitalized for vertigo and her cardiologist lowered her digoxin after that. 20G IV placed in L forearm, 18G IV placed in R forearm, labs sent. pending results

## 2024-02-19 NOTE — PATIENT PROFILE ADULT - FUNCTIONAL ASSESSMENT - DAILY ACTIVITY SCORE.
Detail Level: Generalized Instructions: This plan will send the code FBSE to the PM system.  DO NOT or CHANGE the price. 24 Price (Do Not Change): 0.00

## 2024-02-19 NOTE — ED PROVIDER NOTE - ATTENDING CONTRIBUTION TO CARE
GEN - NAD; pleasant, conversational,  A+O x3   HEAD - NC/AT   EYES- PERRL, EOMI  ENT: Airway patent, very dry mm, Oral cavity and pharynx normal. No inflammation, swelling, exudate, or lesions.  NECK: Neck supple  PULMONARY - CTA b/l, symmetric breath sounds.   CARDIAC -s1s2, tachy, irreg, hr between 140s-160  ABDOMEN - +BS, ND, NT, soft, no guarding, no rebound, no masses   BACK - no CVA tenderness, Normal  spine   EXTREMITIES - FROM, symmetric pulses, capillary refill < 2 seconds, no edema   SKIN - no rash or bruising   NEUROLOGIC - alert, speech clear, no focal deficits  PSYCH -nl mood/affect, nl insight.  a/p-agree with above hpi-patient with  and son at bedside. Reporting intermittent fever, cough, congestion, chills which started two weeks ago, had brief stint of feeling better then started again, also started to notice dysuria few days ago, no hematuria. No ha, np, ns, cp, sob, abd pain, vomiting, diarrhea,edema, lightheadedness or dizziness, palps. Went to UC and was noted to be in rvr (h/o afib on dig/warf/metop) and rec to come to ed. Denies any active symptoms at this time-does not feel palps/dizziness/cp/sob. Rectal temp indicating fever, +tachycardia (afib with rvr), nl o2 sat, unlabored breathing, clear lungs, appears very dry, no s/o overload. Suspect rvr likely response to possible infectious etiology/fever/dehydration, mentating well. Will check labs, cultures, ua, hydrate, rvp, empiric abx, pocus. Patient and family at bedside agreeable. to plan. GEN - NAD; pleasant, conversational,  A+O x3   HEAD - NC/AT   EYES- PERRL, EOMI  ENT: Airway patent, very dry mm, Oral cavity and pharynx normal. No inflammation, swelling, exudate, or lesions.  NECK: Neck supple  PULMONARY - CTA b/l, symmetric breath sounds.   CARDIAC -s1s2, tachy, irreg, hr between 140s-160  ABDOMEN - +BS, ND, NT, soft, no guarding, no rebound, no masses   BACK - no CVA tenderness, Normal  spine   EXTREMITIES - FROM, symmetric pulses, capillary refill < 2 seconds, no edema   SKIN - no rash or bruising   NEUROLOGIC - alert, speech clear, no focal deficits  PSYCH -nl mood/affect, nl insight.  a/p-agree with above hpi-patient with  and son at bedside. Reporting intermittent fever, cough, congestion, chills which started two weeks ago, had brief stint of feeling better then started again, also started to notice dysuria few days ago, no hematuria. No ha, np, ns, cp, sob, abd pain, vomiting, diarrhea,edema, lightheadedness or dizziness, palps. Went to UC and was noted to be in rvr (h/o afib on dig/warf/metop) and rec to come to ed. Denies any active symptoms at this time-does not feel palps/dizziness/cp/sob. Rectal temp indicating fever, +tachycardia (afib with rvr), nl o2 sat, unlabored breathing, clear lungs, appears very dry, no s/o overload. Suspect rvr likely response to possible infectious etiology/fever/dehydration, mentating well. Will check labs, cultures, ua, hydrate, rvp, empiric abx, pocus. Patient and family at bedside agreeable. to plan. Consider rate control if unresponsive to initial tx.

## 2024-02-19 NOTE — CONSULT NOTE ADULT - NS ATTEND AMEND GEN_ALL_CORE FT
patient seen and examined  labs and vitals reviewed  agree with above assessment and plan  68F with pmhx of rheumatic heart disease, mechanical aortic and mitral valve (coumadin), afib (metop & dig), DM, sent in by urgent care for tachycardia. pt found to have septic shock and afib with RVR   initially requiring pressor support with jenna  also with DKA, on insulin drip, endo following  HR remain elevated  unclear if hx of afib, would not be unreasonable given hx of MVR  suspect HR will improve as sepsis is treated  now on po bb  can use cardizem, or po bb.   if cont to be hypotensive can consider amio  a/c per primary team, on coumadin at home already  will follow

## 2024-02-19 NOTE — H&P ADULT - ATTENDING COMMENTS
67 yo female with h/o Afib and mechanical Ao valve replacement on coumadin, digoxin, metoprolol, DM on Jardiance, presenting with waxing and waning flu'like illness x 2 weeks and dysuria, found to have  at urgent care today and was sent to ED where she was in Afib RVR to 160, temp 101, BP soft 90/60, +UA, BP remained soft/low despite 3L crystalloid; HR improved somewhat after lopressor.  Bloodwork showed HAGMA with glucose 200's and elevated BHB c/w euglycemic DKA.  Insulin gtt initiated; K 4.4.  Given ceftriaxone; admitted to MICU on 3.5mcg of phenylephrine, RVP negative; CXR negative  POCUS: Bilat A line predom but more B's on L.  LVsF appears intact or minimally reduced, IVC 1.5 without resp variation  Awake, alert, oriented NAD, conversant on RA  Lungs clear, abd soft NT, Cor irreg tachy, no periph edema  # Euglycemic DKA  # Afib RVR - on dig with subtheraputic level, responds to metoprolol, INR theraputic, driven by sepsis  # +UA  # Shock state , appears volume repleted, likely distributive 2/2 acute infection and fever as well as some cardiogenic component 2/2 tachyarrhythmia and Afib  - IVF with dextrose while on insulin gtt, NPO  - q6h bmp  - q1h fs  - ceftriaxone and f/u UCx  - supplement digoxin, continue metoprolol prn, phenylephrine gtt to maintain MAP >64  - DVT ppx on warfarin  - [facility] full code 67 yo female with h/o Afib and mechanical Ao valve replacement on coumadin, digoxin, metoprolol, DM on Jardiance, presenting with waxing and waning flu'like illness x 2 weeks and dysuria, found to have  at urgent care today and was sent to ED where she was in Afib RVR to 160,afeb, BP soft 90/60, +UA, BP remained soft/low despite 3L crystalloid; HR improved somewhat after lopressor.  Bloodwork showed HAGMA with glucose 200's and elevated BHB c/w euglycemic DKA.  Insulin gtt initiated; K 4.4.  Given ceftriaxone; admitted to MICU on 3.5mcg of phenylephrine, RVP negative; CXR negative  POCUS: Bilat A line predom but more B's on L.  LVsF appears intact or minimally reduced, IVC 1.5 without resp variation  Awake, alert, oriented NAD, conversant on RA  Lungs clear, abd soft NT, Cor irreg tachy, no periph edema  # Euglycemic DKA  # Afib RVR - on dig with subtheraputic level, responds to metoprolol, INR theraputic, driven by sepsis  # +UA  # Shock state , appears volume repleted, likely distributive 2/2 acute infection as well as some cardiogenic component 2/2 tachyarrhythmia and Afib  - IVF with dextrose while on insulin gtt, NPO  - q6h bmp  - q1h fs  - ceftriaxone and f/u UCx  - supplement digoxin, continue metoprolol prn, phenylephrine gtt to maintain MAP >64  - DVT ppx on warfarin  - [facility] full code

## 2024-02-19 NOTE — CONSULT NOTE ADULT - SUBJECTIVE AND OBJECTIVE BOX
CC:  Patient is a 68y old  Female who presents with a chief complaint of tachycardia  HPI:  68F with pmhx of mechanical aortic and mitral valve (coumadin), afib (metop & dig), T2DM, sent in by urgent care for tachycardia. Pt reports of having "flu like" symptoms for 2 weeks and started feeling chills and low grading fever yesterday and went ot urgent care today.  Pt denies of CP, but c/o intermittent palpitations, cough, sob, fever and chills.     In ED, BP 71/59, map64, , temp 36.7C, o2sat 98% on room air. received tylenol iv, azithro, ceftriaxone, phenylephrine 300mcg ivp, lopressor 5mg ivp and 2 liter bolus. Phenylephrine drip started for hypotension. pt found to have Afib with RVR in 150's-160's, sbp in 70's, labs significant for VBG lactate 4.0-->3.5, transaminitis, INR 2.94 troponin 103-->98, CKMB 2.7, C-reactive protein 219.6, , AG 20. CXR with clear lungs. CCU consult called for concern for cardiogenic shock       Allergies    No Known Allergies    Intolerances    	    MEDICATIONS    metoprolol tartrate 25 milliGRAM(s) Oral Once  phenylephrine    Infusion 1 MICROgram(s)/kG/Min IV Continuous <Continuous>            PAST MEDICAL & SURGICAL HISTORY:  T2DM (type 2 diabetes mellitus)      Chronic atrial fibrillation      H/O mitral valve replacement with mechanical valve      H/O mechanical aortic valve replacement          FAMILY HISTORY:  No pertinent family history in first degree relatives        SOCIAL HISTORY    Marital Status:   Occupation:   Lives with:     SUBSTANCE USE  Tobacco Usage:  ( ) None ( ) never smoked   ( ) former smoker  ( ) current smoker; Packs per day:   Alcohol Usage: ( ) none  ( ) occasional ( ) 2-3 times a week ( ) daily; Last drink:   Recreational drugs ( ) None    CONSTITUTIONAL: No fevers, No chills, No fatigue, No weight gain  EYES: No vision changes   ENT: No congestion, No ear pain, No sore throat.  NECK: No pain, No stiffness  RESPIRATORY: No shortness of breath, No cough, No wheezing, No hemoptysis  CARDIOVASCULAR: No chest pain. No palpitations, No DOSS, No orthopnea, No paroxysmal nocturnal dyspnea, No pleuritic pain  GASTROINTESTINAL: No abdominal pain, No nausea, No vomiting, No hematemesis, No diarrhea No constipation. No melena  GENITOURINARY: No dysuria, No frequency, No incontinence, No hematuria  NEUROLOGICAL: No dizziness, No lightheadedness, No syncope, No LOC, No headache, No numbness or weakness  MUSCULOSKELETAL: No Edema, No joint pain, No joint swelling.  PSYCHIATRIC: No anxiety, No depression  DERMATOLOGY: No diaphoresis. No itching, No rashes, No pressure ulcers  HEME/LYMPH: No easy bruising, or bleeding gums    All other review of systems is negative unless indicated above.    VITAL SIGNS  T(C): 36.3 (24 @ 19:00), Max: 38.4 (24 @ 15:03)  HR: 129 (24 @ 19:16) (113 - 160)  BP: 91/66 (24 @ 19:16) (63/52 - 119/102)  RR: 21 (24 @ 19:16) (16 - 25)  SpO2: 99% (24 @ 19:16) (96% - 100%)  Wt(kg): --    Appearance: NAD, no distress  HEENT: Moist Mucous Membranes, Anicteric, PERRL, EOMI  Cardiovascular: Regular rate and rhythm, Normal S1 S2, No JVD, No murmurs  Respiratory: Lungs clear to auscultation. No rales, No rhonchi, No wheezing. No tenderness to palpation  Gastrointestinal:  Soft, Non-tender, + BS  Neurologic: Non-focal, A&Ox3  Skin: Warm and dry, No rashes, No ecchymosis, No cyanosis  Musculoskeletal: No clubbing, No cyanosis, No edema  Vascular: Peripheral pulses palpable 2+ bilaterally  Psychiatry: Mood & affect appropriate      	    		        I&O's Summary      LABORATORY VALUES	 	                          11.3   9.72  )-----------( 230      ( 2024 14:43 )             36.5           135  |  102  |  30<H>  ----------------------------<  233<H>  4.7   |  13<L>  |  0.84      134<L>  |  99  |  32<H>  ----------------------------<  201<H>  4.9   |  15<L>  |  0.86    Ca    7.7<L>      2024 16:45  Ca    8.4      2024 14:43    TPro  6.3  /  Alb  3.1<L>  /  TBili  0.9  /  DBili  x   /  AST  96<H>  /  ALT  77<H>  /  AlkPhos  90    TPro  6.7  /  Alb  3.2<L>  /  TBili  0.8  /  DBili  x   /  AST  79<H>  /  ALT  67<H>  /  AlkPhos  117      LIVER FUNCTIONS - ( 2024 16:45 )  Alb: 3.1 g/dL / Pro: 6.3 g/dL / ALK PHOS: 90 U/L / ALT: 77 U/L / AST: 96 U/L / GGT: x           Activated Partial Thromboplastin Time: 31.5 sec ( @ 14:43)      CARDIAC MARKERS:            Blood Gas Venous - Lactate: 3.5 mmol/L ( @ 16:46)  Blood Gas Venous - Lactate: 4.0 mmol/L ( @ 14:43)    10-31 @ 05:58  Cholesterol, Serum - 137  Direct LDL- --  HDL Cholesterol, Serum- 38  Triglycerides, Serum- 135      Thyroid Stimulating Hormone, Serum: 3.65 uIU/mL ( @ 05:27)      Urinalysis Basic - ( 2024 18:18 )    Color: Yellow / Appearance: Turbid / S.025 / pH: x  Gluc: x / Ketone: Trace mg/dL  / Bili: Negative / Urobili: 1.0 mg/dL   Blood: x / Protein: 300 mg/dL / Nitrite: Negative   Leuk Esterase: Small / RBC: x / WBC x   Sq Epi: x / Non Sq Epi: x / Bacteria: x      CAPILLARY BLOOD GLUCOSE      POCT Blood Glucose.: 180 mg/dL (2024 14:03)       @ 14:43  229E Corona Virus --  Adenovirus NotDetec  Bordetella pertussis NotDetec  Chlamydia pneumoniae NotDetec  Entero/Rhino Virus NotDetec  HKU1 Coronavirus --  hMPV NotDetec  Influenza A [facility]  Influenza AH1 --  Influenza AH1 2009 --  Influenza AH3 --  Influenza B [facility]  Mycoplasma pneumoniae [facility]  NL63 Coronavirus --  OC43 Corornavirus --  Parainfluenza 1 [facility]  Parainfluenza 2 [facility]          TELEMETRY: 	    ECG:  	  RADIOLOGY:  OTHER: 	    PREVIOUS DIAGNOSTIC TESTING:    [ ] Echocardiogram:  < from: TTE W or WO Ultrasound Enhancing Agent (23 @ 10:29) >     CONCLUSIONS:      1. Left ventricular systolic function is normal with an ejection fraction of 65 % by Bunch's method of disks.   2. Normal right ventricular cavity size, wall thickness, and systolic function.   3. The left atrium is severely dilated in size.   4. Estimated pulmonary artery systolic pressure is 26 mmHg.   5. A mechanical valve replacement present in the aortic position. The peak transaortic velocity is 3.83 m/s, peak transaortic gradient is 58.7 mmHg and mean transaortic gradient is 32.0 mmHg with an LVOT/aortic valve VTI ratio of 0.27. Gradients are elevated in the presence of a prosthetic aortic valve. There is mild aortic regurgitation.   6. Mechanical valveis present in the mitral position. Transvalvular mitral gradients are normal. The transmitral peak gradient is 21.3 mmHg and mean gradient is 5.00 mmHg. There is trace mitral regurgitation. (HR 88)   7. The inferior vena cava is normal in size (normal <2.1cm) with abnormal inspiratory collapse (abnormal <50%) consistent with mildly elevated right atrial pressure (~8, range 5-10mmHg).   8. No pericardial effusion seen.    _______________________________________    < end of copied text >     	   CC:  Patient is a 68y old  Female who presents with a chief complaint of tachycardia  HPI:  68F with pmhx of mechanical aortic and mitral valve (coumadin), afib (metop & dig), T2DM, sent in by urgent care for tachycardia. Pt reports of having "flu like" symptoms for 2 weeks and started feeling chills and low grading fever yesterday and went ot urgent care today.  Pt denies of CP, but c/o intermittent palpitations, cough, sob, fever and chills.     In ED, BP 71/59, map64, , temp 36.7C, o2sat 98% on room air. received tylenol iv, azithro, ceftriaxone, phenylephrine 300mcg ivp, lopressor 5mg ivp and 2 liter bolus. Phenylephrine drip started for hypotension. pt found to have Afib with RVR in 150's-160's, sbp in 70's, labs significant for VBG lactate 4.0-->3.5, transaminitis, INR 2.94 troponin 103-->98, CKMB 2.7, C-reactive protein 219.6, , AG 20. CXR with clear lungs. CCU consult called for concern for cardiogenic shock     -Follow Dr. Mazariegos in [facility] as his cardiologist  -card meds: metop 25mg po BID, dig 0.125mg qod, enalapril 2.5mg daily, warfain 2.5mg    Allergies    No Known Allergies    Intolerances    	    MEDICATIONS    metoprolol tartrate 25 milliGRAM(s) Oral Once  phenylephrine    Infusion 1 MICROgram(s)/kG/Min IV Continuous <Continuous>            PAST MEDICAL & SURGICAL HISTORY:  T2DM (type 2 diabetes mellitus)      Chronic atrial fibrillation      H/O mitral valve replacement with mechanical valve      H/O mechanical aortic valve replacement          FAMILY HISTORY:  No pertinent family history in first degree relatives        SOCIAL HISTORY    Marital Status:   Occupation:   Lives with:     SUBSTANCE USE  Tobacco Usage:  ( X) None ( ) never smoked   ( ) former smoker  ( ) current smoker; Packs per day:   Alcohol Usage: ( X) none  ( ) occasional ( ) 2-3 times a week ( ) daily; Last drink:   Recreational drugs (X ) None    CONSTITUTIONAL: No fevers, No chills, No fatigue, No weight gain  EYES: No vision changes   ENT: No congestion, No ear pain, No sore throat.  NECK: No pain, No stiffness  RESPIRATORY: +shortness of breath, + cough, No wheezing, No hemoptysis  CARDIOVASCULAR: No chest pain. +palpitations, No DOSS, No orthopnea, No paroxysmal nocturnal dyspnea, No pleuritic pain  GASTROINTESTINAL: No abdominal pain, No nausea, No vomiting, No hematemesis, No diarrhea No constipation. No melena  GENITOURINARY: No dysuria, No frequency, No incontinence, No hematuria  NEUROLOGICAL: No dizziness, No lightheadedness, No syncope, No LOC, No headache, No numbness or weakness  MUSCULOSKELETAL: No Edema, No joint pain, No joint swelling.  PSYCHIATRIC: No anxiety, No depression  DERMATOLOGY: No diaphoresis. No itching, No rashes, No pressure ulcers  HEME/LYMPH: No easy bruising, or bleeding gums    All other review of systems is negative unless indicated above.    VITAL SIGNS  T(C): 36.3 (24 @ 19:00), Max: 38.4 (24 @ 15:03)  HR: 129 (24 @ 19:16) (113 - 160)  BP: 91/66 (24 @ 19:16) (63/52 - 119/102)  RR: 21 (24 @ 19:16) (16 - 25)  SpO2: 99% (24 @ 19:16) (96% - 100%)  Wt(kg): --    Appearance: NAD, no distress  HEENT: Moist Mucous Membranes,  Cardiovascular: Regular rate and rhythm, Normal S1 S2, No JVD, +mech valve click  Respiratory: Lungs clear to auscultation. No rales, No rhonchi, No wheezing. No tenderness to palpation  Gastrointestinal:  Soft, Non-tender, + BS  Neurologic: Non-focal, A&Ox3  Skin: Warm and dry, No rashes, No ecchymosis, No cyanosis  Musculoskeletal: No clubbing, No cyanosis, No edema  Vascular: Peripheral pulses palpable 2+ bilaterally  Psychiatry: Mood & affect appropriate      	    		        I&O's Summary      LABORATORY VALUES	 	                          11.3   9.72  )-----------( 230      ( 2024 14:43 )             36.5           135  |  102  |  30<H>  ----------------------------<  233<H>  4.7   |  13<L>  |  0.84      134<L>  |  99  |  32<H>  ----------------------------<  201<H>  4.9   |  15<L>  |  0.86    Ca    7.7<L>      2024 16:45  Ca    8.4      2024 14:43    TPro  6.3  /  Alb  3.1<L>  /  TBili  0.9  /  DBili  x   /  AST  96<H>  /  ALT  77<H>  /  AlkPhos  90    TPro  6.7  /  Alb  3.2<L>  /  TBili  0.8  /  DBili  x   /  AST  79<H>  /  ALT  67<H>  /  AlkPhos  117      LIVER FUNCTIONS - ( 2024 16:45 )  Alb: 3.1 g/dL / Pro: 6.3 g/dL / ALK PHOS: 90 U/L / ALT: 77 U/L / AST: 96 U/L / GGT: x           Activated Partial Thromboplastin Time: 31.5 sec ( @ 14:43)      CARDIAC MARKERS:  tropoinin 103--->98          Blood Gas Venous - Lactate: 3.5 mmol/L ( @ 16:46)  Blood Gas Venous - Lactate: 4.0 mmol/L ( @ 14:43)    10-31 @ 05:58  Cholesterol, Serum - 137  Direct LDL- --  HDL Cholesterol, Serum- 38  Triglycerides, Serum- 135      Thyroid Stimulating Hormone, Serum: 3.65 uIU/mL ( @ 05:27)      Urinalysis Basic - ( 2024 18:18 )    Color: Yellow / Appearance: Turbid / S.025 / pH: x  Gluc: x / Ketone: Trace mg/dL  / Bili: Negative / Urobili: 1.0 mg/dL   Blood: x / Protein: 300 mg/dL / Nitrite: Negative   Leuk Esterase: Small / RBC: x / WBC x   Sq Epi: x / Non Sq Epi: x / Bacteria: x      CAPILLARY BLOOD GLUCOSE      POCT Blood Glucose.: 180 mg/dL (2024 14:03)       @ 14:43  229E Corona Virus --  Adenovirus NotDetec  Bordetella pertussis NotDetec  Chlamydia pneumoniae NotDetec  Entero/Rhino Virus NotDetec  HKU1 Coronavirus --  hMPV NotDetec  Influenza A NotDetec  Influenza AH1 --  Influenza AH1 2009 --  Influenza AH3 --  Influenza B NotDetec  Mycoplasma pneumoniae NotDetec  NL63 Coronavirus --  OC43 Corornavirus --  Parainfluenza 1 NotDetec  Parainfluenza 2 NotDetec    : 	    ECG:  AFib , no ischemic changes	      RADIOLOGY:  < from: Xray Chest 1 View- PORTABLE-Urgent (Xray Chest 1 View- PORTABLE-Urgent .) (24 @ 15:50) >  FINDINGS:    The heart size is not accurately assessed in this AP projection. Aortic   valvular replacements.  The lungs are clear. Elevation of the hemidiaphragm.  There is no pleural effusion. There is no pneumothorax.  No acute bony abnormality. Median sternotomy wires are intact.    IMPRESSION:  Clear lungs.        < end of copied text >  	    PREVIOUS DIAGNOSTIC TESTING:    [ ] Echocardiogram:  < from: TTE W or WO Ultrasound Enhancing Agent (23 @ 10:29) >     CONCLUSIONS:      1. Left ventricular systolic function is normal with an ejection fraction of 65 % by Bunch's method of disks.   2. Normal right ventricular cavity size, wall thickness, and systolic function.   3. The left atrium is severely dilated in size.   4. Estimated pulmonary artery systolic pressure is 26 mmHg.   5. A mechanical valve replacement present in the aortic position. The peak transaortic velocity is 3.83 m/s, peak transaortic gradient is 58.7 mmHg and mean transaortic gradient is 32.0 mmHg with an LVOT/aortic valve VTI ratio of 0.27. Gradients are elevated in the presence of a prosthetic aortic valve. There is mild aortic regurgitation.   6. Mechanical valveis present in the mitral position. Transvalvular mitral gradients are normal. The transmitral peak gradient is 21.3 mmHg and mean gradient is 5.00 mmHg. There is trace mitral regurgitation. (HR 88)   7. The inferior vena cava is normal in size (normal <2.1cm) with abnormal inspiratory collapse (abnormal <50%) consistent with mildly elevated right atrial pressure (~8, range 5-10mmHg).   8. No pericardial effusion seen.    _______________________________________    < end of copied text >     	   CC:  Patient is a 68y old  Female who presents with a chief complaint of tachycardia  HPI:  68F with pmhx of rheumatic heart dz, mechanical aortic and mitral valve (coumadin), afib (metop & dig), T2DM, sent in by urgent care for tachycardia. Pt reports of having "flu like" symptoms for 2 weeks and started feeling chills and low grading fever yesterday and went ot urgent care today.  Pt denies of CP, but c/o intermittent palpitations, cough, sob, fever and chills.     In ED, BP 71/59, map64, , temp 36.7C, o2sat 98% on room air. received tylenol iv, azithro, ceftriaxone, phenylephrine 300mcg ivp, lopressor 5mg ivp and 2 liter bolus. Phenylephrine drip started for hypotension. pt found to have Afib with RVR in 150's-160's, sbp in 70's, labs significant for VBG lactate 4.0-->3.5, transaminitis, INR 2.94 troponin 103-->98, CKMB 2.7, C-reactive protein 219.6, , AG 20. CXR with clear lungs. CCU consult called for concern for cardiogenic shock     -Follow Dr. Mazariegos in [facility] as his cardiologist  -card meds: metop 25mg po BID, dig 0.125mg qod, enalapril 2.5mg daily, warfain 2.5mg    Allergies    No Known Allergies    Intolerances    	    MEDICATIONS    metoprolol tartrate 25 milliGRAM(s) Oral Once  phenylephrine    Infusion 1 MICROgram(s)/kG/Min IV Continuous <Continuous>            PAST MEDICAL & SURGICAL HISTORY:  T2DM (type 2 diabetes mellitus)      Chronic atrial fibrillation      H/O mitral valve replacement with mechanical valve      H/O mechanical aortic valve replacement          FAMILY HISTORY:  No pertinent family history in first degree relatives        SOCIAL HISTORY    Marital Status:   Occupation:   Lives with:     SUBSTANCE USE  Tobacco Usage:  ( X) None ( ) never smoked   ( ) former smoker  ( ) current smoker; Packs per day:   Alcohol Usage: ( X) none  ( ) occasional ( ) 2-3 times a week ( ) daily; Last drink:   Recreational drugs (X ) None    CONSTITUTIONAL: No fevers, No chills, No fatigue, No weight gain  EYES: No vision changes   ENT: No congestion, No ear pain, No sore throat.  NECK: No pain, No stiffness  RESPIRATORY: +shortness of breath, + cough, No wheezing, No hemoptysis  CARDIOVASCULAR: No chest pain. +palpitations, No DOSS, No orthopnea, No paroxysmal nocturnal dyspnea, No pleuritic pain  GASTROINTESTINAL: No abdominal pain, No nausea, No vomiting, No hematemesis, No diarrhea No constipation. No melena  GENITOURINARY: No dysuria, No frequency, No incontinence, No hematuria  NEUROLOGICAL: No dizziness, No lightheadedness, No syncope, No LOC, No headache, No numbness or weakness  MUSCULOSKELETAL: No Edema, No joint pain, No joint swelling.  PSYCHIATRIC: No anxiety, No depression  DERMATOLOGY: No diaphoresis. No itching, No rashes, No pressure ulcers  HEME/LYMPH: No easy bruising, or bleeding gums    All other review of systems is negative unless indicated above.    VITAL SIGNS  T(C): 36.3 (24 @ 19:00), Max: 38.4 (24 @ 15:03)  HR: 129 (24 @ 19:16) (113 - 160)  BP: 91/66 (24 @ 19:16) (63/52 - 119/102)  RR: 21 (24 @ 19:16) (16 - 25)  SpO2: 99% (24 @ 19:16) (96% - 100%)  Wt(kg): --    Appearance: NAD, no distress  HEENT: Moist Mucous Membranes,  Cardiovascular: Regular rate and rhythm, Normal S1 S2, No JVD, +mech valve click  Respiratory: Lungs clear to auscultation. No rales, No rhonchi, No wheezing. No tenderness to palpation  Gastrointestinal:  Soft, Non-tender, + BS  Neurologic: Non-focal, A&Ox3  Skin: Warm and dry, No rashes, No ecchymosis, No cyanosis  Musculoskeletal: No clubbing, No cyanosis, No edema  Vascular: Peripheral pulses palpable 2+ bilaterally  Psychiatry: Mood & affect appropriate      	    		        I&O's Summary      LABORATORY VALUES	 	                          11.3   9.72  )-----------( 230      ( 2024 14:43 )             36.5           135  |  102  |  30<H>  ----------------------------<  233<H>  4.7   |  13<L>  |  0.84      134<L>  |  99  |  32<H>  ----------------------------<  201<H>  4.9   |  15<L>  |  0.86    Ca    7.7<L>      2024 16:45  Ca    8.4      2024 14:43    TPro  6.3  /  Alb  3.1<L>  /  TBili  0.9  /  DBili  x   /  AST  96<H>  /  ALT  77<H>  /  AlkPhos  90    TPro  6.7  /  Alb  3.2<L>  /  TBili  0.8  /  DBili  x   /  AST  79<H>  /  ALT  67<H>  /  AlkPhos  117      LIVER FUNCTIONS - ( 2024 16:45 )  Alb: 3.1 g/dL / Pro: 6.3 g/dL / ALK PHOS: 90 U/L / ALT: 77 U/L / AST: 96 U/L / GGT: x           Activated Partial Thromboplastin Time: 31.5 sec ( @ 14:43)      CARDIAC MARKERS:  tropoinin 103--->98          Blood Gas Venous - Lactate: 3.5 mmol/L ( @ 16:46)  Blood Gas Venous - Lactate: 4.0 mmol/L ( @ 14:43)    10-31 @ 05:58  Cholesterol, Serum - 137  Direct LDL- --  HDL Cholesterol, Serum- 38  Triglycerides, Serum- 135      Thyroid Stimulating Hormone, Serum: 3.65 uIU/mL ( @ 05:27)      Urinalysis Basic - ( 2024 18:18 )    Color: Yellow / Appearance: Turbid / S.025 / pH: x  Gluc: x / Ketone: Trace mg/dL  / Bili: Negative / Urobili: 1.0 mg/dL   Blood: x / Protein: 300 mg/dL / Nitrite: Negative   Leuk Esterase: Small / RBC: x / WBC x   Sq Epi: x / Non Sq Epi: x / Bacteria: x      CAPILLARY BLOOD GLUCOSE      POCT Blood Glucose.: 180 mg/dL (2024 14:03)       @ 14:43  229E Corona Virus --  Adenovirus NotDetec  Bordetella pertussis NotDetec  Chlamydia pneumoniae NotDetec  Entero/Rhino Virus NotDetec  HKU1 Coronavirus --  hMPV NotDetec  Influenza A NotDetec  Influenza AH1 --  Influenza AH1 2009 --  Influenza AH3 --  Influenza B NotDetec  Mycoplasma pneumoniae NotDetec  NL63 Coronavirus --  OC43 Corornavirus --  Parainfluenza 1 NotDetec  Parainfluenza 2 NotDetec    : 	    ECG:  AFib , no ischemic changes	      RADIOLOGY:  < from: Xray Chest 1 View- PORTABLE-Urgent (Xray Chest 1 View- PORTABLE-Urgent .) (24 @ 15:50) >  FINDINGS:    The heart size is not accurately assessed in this AP projection. Aortic   valvular replacements.  The lungs are clear. Elevation of the hemidiaphragm.  There is no pleural effusion. There is no pneumothorax.  No acute bony abnormality. Median sternotomy wires are intact.    IMPRESSION:  Clear lungs.        < end of copied text >  	    PREVIOUS DIAGNOSTIC TESTING:    [ ] Echocardiogram:  < from: TTE W or WO Ultrasound Enhancing Agent (23 @ 10:29) >     CONCLUSIONS:      1. Left ventricular systolic function is normal with an ejection fraction of 65 % by Bunch's method of disks.   2. Normal right ventricular cavity size, wall thickness, and systolic function.   3. The left atrium is severely dilated in size.   4. Estimated pulmonary artery systolic pressure is 26 mmHg.   5. A mechanical valve replacement present in the aortic position. The peak transaortic velocity is 3.83 m/s, peak transaortic gradient is 58.7 mmHg and mean transaortic gradient is 32.0 mmHg with an LVOT/aortic valve VTI ratio of 0.27. Gradients are elevated in the presence of a prosthetic aortic valve. There is mild aortic regurgitation.   6. Mechanical valveis present in the mitral position. Transvalvular mitral gradients are normal. The transmitral peak gradient is 21.3 mmHg and mean gradient is 5.00 mmHg. There is trace mitral regurgitation. (HR 88)   7. The inferior vena cava is normal in size (normal <2.1cm) with abnormal inspiratory collapse (abnormal <50%) consistent with mildly elevated right atrial pressure (~8, range 5-10mmHg).   8. No pericardial effusion seen.    _______________________________________    < end of copied text >

## 2024-02-19 NOTE — H&P ADULT - NSHPLABSRESULTS_GEN_ALL_CORE
LABS:                         11.3   9.72  )-----------( 230      ( 2024 14:43 )             36.5         135  |  102  |  30<H>  ----------------------------<  233<H>  4.7   |  13<L>  |  0.84    Ca    7.7<L>      2024 16:45    TPro  6.3  /  Alb  3.1<L>  /  TBili  0.9  /  DBili  x   /  AST  96<H>  /  ALT  77<H>  /  AlkPhos  90      PT/INR - ( 2024 14:43 )   PT: 32.2 sec;   INR: 2.94 ratio         PTT - ( 2024 14:43 )  PTT:31.5 sec  Urinalysis Basic - ( 2024 18:18 )    Color: Yellow / Appearance: Turbid / S.025 / pH: x  Gluc: x / Ketone: Trace mg/dL  / Bili: Negative / Urobili: 1.0 mg/dL   Blood: x / Protein: 300 mg/dL / Nitrite: Negative   Leuk Esterase: Small / RBC: 57 /HPF /  /HPF   Sq Epi: x / Non Sq Epi: 3 /HPF / Bacteria: Many /HPF            RADIOLOGY, EKG & ADDITIONAL TESTS: Reviewed.

## 2024-02-19 NOTE — CONSULT NOTE ADULT - ASSESSMENT
ASSESSMENT AND PLAN  68F with pmhx of mechanical aortic and mitral valve (coumadin), afib (metop & dig), T2DM, sent in by urgent care for tachycardia. Found to have afib with RVR and hypotension i/s/o fever &chills at home. CCU consult called for concern for cardiogenic shock        PLAN  #shock  -likely distributive/sepsis induced, i/s/o fever and subjective chills at home, and elevated inflammatory marker  -on exam, pt euvolemic, no jvd, no LE edema,   -lactate 4.0-->3.5 s/p 2 liter bolus  -echo from 11/2023 shows EF 65% and mild AR, trace MR with mechanical valve  -c/w phenylepherine drip for bp support  -daughter reports pt's SBP runs 90's at baseline.  -trend lactate  -troponin elevated, but down trending, likely i/s/o tachycardia.   -consider checking UA      #afib w/ RVR  -s/p lopressor 5mg ivp  -home meds: metop 25mg po BID  -chadsvasc 3  -continue AC  -metop 25 bid to keep HR <120    #mech AV and MV  -c/w coumadin  -check INR daily and dose coumadin           	      -house cardiology will follow, call # [phone] for questions.    Case discussed with Dr. Otoniel Cooper, cardiology fellow  Attending attestation to follow.                 ASSESSMENT AND PLAN  68F with pmhx of mechanical aortic and mitral valve (coumadin), afib (metop & dig), T2DM, sent in by urgent care for tachycardia. Found to have afib with RVR and hypotension i/s/o fever &chills at home. CCU consult called for concern for cardiogenic shock        PLAN  #shock  -likely distributive/sepsis induced, i/s/o fever and subjective chills at home, and elevated inflammatory marker  -on exam, pt euvolemic, no jvd, no LE edema,   -lactate 4.0-->3.5 s/p 2 liter bolus  -echo from 11/2023 shows EF 65% and mild AR, trace MR with mechanical valve  -c/w phenylepherine drip for bp support  -daughter reports pt's SBP runs 90's at baseline.  -trend lactate  -troponin elevated, but down trending, likely i/s/o tachycardia.   -consider checking UA    #afib w/ RVR  -s/p lopressor 5mg ivp X1  -home meds: metop 25mg po BID  -chadsvasc 3  -continue AC  -metop 25 bid to keep HR <120    #mech AV and MV  -c/w coumadin  -check INR daily and dose coumadin     -currently not a CCU candidate, consider MICU consult.  -house cardiology will follow, call # [phone] for questions.  Case discussed with Dr. Otoniel Cooper, cardiology fellow  Attending attestation to follow.                 ASSESSMENT AND PLAN  68F with pmhx of mechanical aortic and mitral valve (coumadin), afib (metop & dig), T2DM, sent in by urgent care for tachycardia. Found to have afib with RVR and hypotension i/s/o fever &chills at home. CCU consult called for concern for cardiogenic shock        PLAN  #shock  -likely distributive/sepsis induced, i/s/o fever and subjective chills at home, and elevated inflammatory marker  -on exam, pt euvolemic, no jvd, no LE edema,   -lactate 4.0-->3.5 s/p 2 liter bolus  -echo from 11/2023 shows EF 65% and mild AR, trace MR with mechanical valve  -c/w phenylepherine drip for bp support  -daughter reports pt's SBP runs 90's at baseline.  -trend lactate  -troponin elevated, but down trending, likely i/s/o tachycardia.   -consider checking UA  -hold home antihypertensive    #afib w/ RVR  -s/p lopressor 5mg ivp X1  -home meds: metop 25mg po BID  -chadsvasc 3  -continue AC  -metop 25 bid to keep HR <120  -c/w dig    #mech AV and MV  -c/w coumadin  -check INR daily and dose coumadin     -currently not a CCU candidate, consider MICU consult.  -house cardiology will follow, call # [phone] for questions.  Case discussed with Dr. Otoniel Cooper, cardiology fellow  Attending attestation to follow.                 ASSESSMENT AND PLAN  68F with pmhx of rheumatic heart disease, mechanical aortic and mitral valve (coumadin), afib (metop & dig), T2DM, sent in by urgent care for tachycardia. Found to have afib with RVR and hypotension i/s/o fever &chills at home. CCU consult called for concern for cardiogenic shock        PLAN  #shock  -likely distributive/sepsis induced, i/s/o fever and subjective chills at home, and elevated inflammatory marker  -on exam, pt euvolemic, no jvd, no LE edema,   -lactate 4.0-->3.5 s/p 2 liter bolus  -echo from 11/2023 shows EF 65% and mild AR, trace MR with mechanical valve  -c/w phenylepherine drip for bp support  -daughter reports pt's SBP runs 90's at baseline.  -trend lactate  -troponin elevated, but down trending, likely i/s/o tachycardia.   -consider checking UA, s/p abx in ED  -hold home antihypertensive    #afib w/ RVR  -likely i/s/o inflammatory/infectious process  -s/p lopressor 5mg ivp X1  -home meds: metop 25mg po BID  -chadsvasc 3  -continue AC  -c/w metop 25 bid to keep HR <120  -c/w dig    #mech AV and MV  -c/w coumadin  -check INR daily and dose coumadin     -currently not a CCU candidate, consider MICU consult.  -house cardiology will follow, call # [phone] for questions.  Case discussed with Dr. Otoniel Cooper, cardiology fellow  Attending attestation to follow.

## 2024-02-19 NOTE — ED ADULT NURSE NOTE - NSFALLUNIVINTERV_ED_ALL_ED
Bed/Stretcher in lowest position, wheels locked, appropriate side rails in place/Call bell, personal items and telephone in reach/Instruct patient to call for assistance before getting out of bed/chair/stretcher/Non-slip footwear applied when patient is off stretcher/[place] to call system/Physically safe environment - no spills, clutter or unnecessary equipment/Purposeful proactive rounding/Room/bathroom lighting operational, light cord in reach

## 2024-02-19 NOTE — PATIENT PROFILE ADULT - FLU SEASON?
SEEN ON FRIDAY  - ON ABX FOR STREP    NOW WITH FREQUENT DRY COUGH  - NO FEVERS  - NOT PANTING    REC RTC FOR AN EVAL TO RULE OUT WHEEZING    FOR NOW:  - LOTS OF FLUIDS  - TRY SUCKING ON SOME HONEY (TO HELP WITH PND COUGH)   Yes...

## 2024-02-19 NOTE — ED PROVIDER NOTE - OBJECTIVE STATEMENT
HPI & ROS: 68-year-old female with a past medical history of mitral regurg, rheumatic heart, atrial fibrillation, hypertension,  on digoxin, warfarin, history of diabetes type 2 on Jardiance, coming in with flulike symptoms from urgent care.  Approximately 2 weeks ago, patient started having cough congestion.  No sick contacts.  Fully immunized.  Patient with intermittent fevers  that are responsive to Tylenol.  Patient went to urgent care today and was found to have A-fib with RVR to the 180s, and was sent in here.  On arrival, welcomed to the room   Without chest pain no shortness of breath, mentating alert and oriented x 3.  Cap refill less than 2.  On the monitor, pressure of around 90/60, heart rate in the 160s.  on chart review, last echocardiogram showing approximately 65%.

## 2024-02-19 NOTE — PATIENT PROFILE ADULT - FALL HARM RISK - HARM RISK INTERVENTIONS

## 2024-02-19 NOTE — ED PROVIDER NOTE - CLINICAL SUMMARY MEDICAL DECISION MAKING FREE TEXT BOX
MDM/Summary/DDx (includes but is not limited to):  68-year-old female with a history of mitral regurgitation, rheumatic heart fever,  atrial fibrillation, hypertension on digoxin and warfarin, history of diabetes type 2, has not missed any recent medications coming in after a flulike illness in her weeks ago, found to be in A-fib with RVR.  Patient's blood pressure is low, but the patient is mentating, cap refill less than 2.  No chest pain or shortness of breath at this time.  exam and history possibly consistent with dysrhythmia, electrolyte abnormalities, could be infectious.  URI versus pneumonia.  Exam and history is not consistent with AAA.  Exam and history is not consistent with pulmonary embolism.  Meeting SIRS criteria.  we will plan for copious amounts of fluids to correct heart rate, synchronized cardioversion if becoming unstable  Labs:   BC CMP PT PTT UA UC blood cultures x 2 twelve-lead troponin proBNP  Imaging: xray chest   Tx: Supportive, pain/nausea medications as pt requires/requests.  Consults/Resources: none at this time   Dispo: pending, likely admit     Triage note reviewed. VS reviewed. EKG reviewed and documented in "RESULTS" section, if possible at given time.     DDx in MDM includes the most likely ddx, but is not limited to solely what is listed. Clinical course may alter/deviate from the above plan. When possible, progress notes written, as needed, and are included in "PROGRESS NOTE" section below.       Medical, family, and social determinants of health reviewed and discussed w/ pt/family/caretaker, when allowable, and is incorporated into note above, whenever possible.

## 2024-02-19 NOTE — H&P ADULT - HISTORY OF PRESENT ILLNESS
68 year old female with PMH Mitral regurgitation, mechanical aortic valve replacement, rheumatic heart disease, atrial fibrillation, HTN, on Digoxin, Warfarin, DM 2 on Jardiance presenting with flu like symptoms from urgent care. 2 weeks ago patient started having cough, congestion, and intermittent fevers which improved. On 02/12 worsening URI with white psutum production. On 02/17 noticed dysuria and polyuria. Fevers improve with Tylenol. Went to urgent care today and found to be in Afib with RVR in the 180s and sent to the ED. No CP, SOB, abd pain, vomiting, diarrhea, edema, lightheadedness or dizziness, and palpitations.     In the ED Afebrile, BP of 90/60, HR in the 160s. Saturating well on room air. Received Acetaminophen, 2L IV bolus, 3 neosticks, started on Phenylephrine drip, and Lopressor 5 mg. Given Azithromycin and Ceftriaxone as well. Blood cultures pending.     Surgical History: Mitral valve repair, aortic valve repair  Allergies: NKDA   FH: None  SH: No smoking, no drinking, no illicit drug use

## 2024-02-19 NOTE — ED PROVIDER NOTE - PHYSICAL EXAMINATION
Exam as stated below:   CONSTITUTIONAL: In NAD. AOx3  SKIN: Warm dry. No rashes noted.   EYES: No scleral icterus. Conjunctiva pink.  NECK: No ttp.    Mouth: dry mucosa   CARD: RRR. No murmurs.  RESP:   Patient satting 100% on room air.  Slightly tachypneic at 818.  No accessory muscle usage.  Patient with dry cough at bedside. Clear to ausculation b/l. No Crackles noted. No Wheezing noted.  ABD: Soft. Non-tender. Not distended.   MSK: No gross pedal edema. No calf tenderness.  NEURO: UE/LE grossly intact. Motor UE/LE sensation grossly intact. CN II-XII grossly intact.   PSYCH: Cooperative, appropriate.

## 2024-02-19 NOTE — ED PROVIDER NOTE - PROGRESS NOTE DETAILS
CARLOS Murillo PGY-2: Patient still  asymptomatic at this time, no chest pain no shortness of breath.  Phenylephrine given for increasing the blood pressure prior to giving Lopressor.  300 mcg, split into 100 mcg 3 times,  was given with no large increase in blood pressure.  Mapping 68.  Lopressor was given, blood pressure controlled under 110.  Mapping 70.  Exam and patient's symptoms unchanged. CARLOS Murillo PGY-2: Due to maps in the 50s, patient asymptomatic, started on phenylephrine.  At this time has raised up to 3  with good response in blood pressure, mapping 70s.  oral dose of home Lopressor held given repeat low MAP just now. CCU d/w fellow currently, likely will cx MICU again after CCU input. Beta added, TSH added.

## 2024-02-19 NOTE — ED ADULT TRIAGE NOTE - CHIEF COMPLAINT QUOTE
Pt arrives ambulatory to triage c/o flu-like sxs x2 wks. RR even and unlabored. PMHx: afib (warfarin), DM2.

## 2024-02-19 NOTE — H&P ADULT - ASSESSMENT
Assessment: 68 F PMH Mitral regurgitation, rheumatic heart disease, atrial fibrillation, HTN, on Digoxin, Warfarin, DM 2 on Jardiance presenting with flu like symptoms, tachycardia, and hypotension requiring pressure support. Likely mutlifactorial shock, primarily septic with component of cardiogenic.     Neuro:  Alert and orientated x3 at baseline  No active issues    Cardiovascular:   #Atrial fibrillation with RVR  #Mitral valvue regurgitation  #Mechanical aortic valve replacement  #History of rheumatic heart disease  #Shock, Septic vs cardiogenic  - Currently on Josep gtt, wean as tolerated  - Mantain MAP>65  - Lopressors PRN for sustained afib with RVR, can consider synchronized cardioversion if hypotension worsens during afib with RVR  - TTE from 11/2023 shows   - Repeatt TTE  - Cardiology consult  - Continue Warfarin, INR goal 2.5-3.5  - Digoxin by level    #HTN  - Hold home Enalapril in the setting of hypotension  #HLD  - Continue Atorvastatin 20 mg daily    Pulmonology  - Satuyrating well on room air, protectin airway    GI  RICHY  If BHB negative, CC/DASH diet  If BHB positive, NPO until anion gap closes     Renal  #Anion gap metabolic acidosis  #Lactic acidosis  #? Ketoacidosis  - S/p 3L IVF  - Monitor VBGs q 12  - Treat underlying cause, possible Euglycemic DKA vs Sepsis   #LUISA  - Cr from 11/2023 of 0.39  - Admission Cr of .89  - LUISA likely pre-renal in the setting of shock  - Urine electrolytes, UA, Serum osmolality  - Monitor UOP and Cr  - Adequate fluid hydration    ID  #Septic shock in the setting of URI vs UTI    - Continue Ceftriaxone 1g daily for 14 days (adjust as per cultures)   - UA and urine culture  - Blood culture pending   - RVP negative  - Complained of burning on urination on 2/15, No CVA tenderness     Endo  #?Euglycemic DKA  #DM2  - Pending BHB  - History of type DM2 on Jardiance, now presenting with infection, polyuria, polydipsia  - If BHB positive, begin insulin gtt, potssium repletion with q4 hour BMPs, and IVF per DKA protocol. Endocrine consult.  - If BHB negative, sliding scale  - Treat underlying infectious etiology     Heme/Onc  - Continue Warfarin with INR goal of 2.5-3.5     Ethics  Full Code

## 2024-02-19 NOTE — ED ADULT NURSE REASSESSMENT NOTE - NS ED NURSE REASSESS COMMENT FT1
Mobile Critical Care RN: pt received in Rm 20 in ED, brought to ED for atrial tachycardia. Pt alert, oriented, vitals as noted, heart rhythm atrial fibrillation noted, rated in 130 to 150 range. Pt received on phenylephrine gtt at 3.7 mcg/kg/min, titrated as needed for MAP >65. See flow sheet for vitals. Pt denies discomfort. Administered 1 liter bolus of NS and PO metoprolol per order, insulin gtt initiated per order. Report given to MICU RN, pt transported to MICU.
Pt A&Ox3. Pt on phenylephrine gtt for hypotension. Critical care RN Graciela at bedside titrating. denies chest pain or SOB. Stretcher set in lowest position, call bell within reach, safety maintained.
Report received from AZAM Haider. Pt is A&Ox3, resting in stretcher. vital signs as noted. MD Murillo aware. denies chest pain, SOB, headache, dizziness/lightheadedness. breathing is even and nonlabored. Afib on cardiac monitor. Plan of care ongoing. Stretcher set in lowest position, call bell within reach, safety maintained.

## 2024-02-19 NOTE — H&P ADULT - NSHPPHYSICALEXAM_GEN_ALL_CORE
T(C): 36.3 (02-19-24 @ 19:00), Max: 38.4 (02-19-24 @ 15:03)  HR: 143 (02-19-24 @ 19:45) (113 - 160)  BP: 95/68 (02-19-24 @ 19:45) (63/52 - 119/102)  RR: 20 (02-19-24 @ 19:45) (16 - 25)  SpO2: 100% (02-19-24 @ 19:45) (96% - 100%)    CONSTITUTIONAL: Well groomed, no apparent distress  EYES: PERRLA and symmetric, EOMI, No conjunctival or scleral injection, non-icteric  ENMT: Oral mucosa with moist membranes. Normal dentition; no pharyngeal injection or exudates  NECK: Supple, symmetric and without tracheal deviation   RESP: No respiratory distress, no use of accessory muscles; CTA b/l, no WRR  CV: Irregularly irregular, +S1S2, no MRG; no JVD; no peripheral edema, (+) tachycardia   GI: Soft, NT, ND, no rebound, no guarding; no palpable masses; no hepatosplenomegaly; no hernia palpated  LYMPH: No cervical LAD or tenderness; no axillary LAD or tenderness; no inguinal LAD or tenderness  SKIN: No rashes or ulcers noted; no subcutaneous nodules or induration palpable  NEURO: CN II-XII intact; normal reflexes in upper and lower extremities, sensation intact in upper and lower extremities b/l to light touch   PSYCH: Appropriate insight/judgment; A+O x 3, mood and affect appropriate, recent/remote memory intact

## 2024-02-20 ENCOUNTER — RESULT REVIEW (OUTPATIENT)
Age: 69
End: 2024-02-20

## 2024-02-20 DIAGNOSIS — E11.9 TYPE 2 DIABETES MELLITUS WITHOUT COMPLICATIONS: ICD-10-CM

## 2024-02-20 DIAGNOSIS — E78.5 HYPERLIPIDEMIA, UNSPECIFIED: ICD-10-CM

## 2024-02-20 DIAGNOSIS — I10 ESSENTIAL (PRIMARY) HYPERTENSION: ICD-10-CM

## 2024-02-20 DIAGNOSIS — E11.10 TYPE 2 DIABETES MELLITUS WITH KETOACIDOSIS WITHOUT COMA: ICD-10-CM

## 2024-02-20 LAB
A1C WITH ESTIMATED AVERAGE GLUCOSE RESULT: 6.9 % — HIGH (ref 4–5.6)
ADD ON TEST-SPECIMEN IN LAB: SIGNIFICANT CHANGE UP
ALBUMIN SERPL ELPH-MCNC: 2.5 G/DL — LOW (ref 3.3–5)
ALBUMIN SERPL ELPH-MCNC: 2.6 G/DL — LOW (ref 3.3–5)
ALBUMIN SERPL ELPH-MCNC: 2.7 G/DL — LOW (ref 3.3–5)
ALBUMIN SERPL ELPH-MCNC: 2.7 G/DL — LOW (ref 3.3–5)
ALBUMIN SERPL ELPH-MCNC: 3 G/DL — LOW (ref 3.3–5)
ALBUMIN SERPL ELPH-MCNC: 3.1 G/DL — LOW (ref 3.3–5)
ALP SERPL-CCNC: 75 U/L — SIGNIFICANT CHANGE UP (ref 40–120)
ALP SERPL-CCNC: 87 U/L — SIGNIFICANT CHANGE UP (ref 40–120)
ALP SERPL-CCNC: 88 U/L — SIGNIFICANT CHANGE UP (ref 40–120)
ALP SERPL-CCNC: 91 U/L — SIGNIFICANT CHANGE UP (ref 40–120)
ALP SERPL-CCNC: 95 U/L — SIGNIFICANT CHANGE UP (ref 40–120)
ALP SERPL-CCNC: 97 U/L — SIGNIFICANT CHANGE UP (ref 40–120)
ALT FLD-CCNC: 318 U/L — HIGH (ref 4–33)
ALT FLD-CCNC: 362 U/L — HIGH (ref 4–33)
ALT FLD-CCNC: 375 U/L — HIGH (ref 4–33)
ALT FLD-CCNC: 377 U/L — HIGH (ref 4–33)
ALT FLD-CCNC: 383 U/L — HIGH (ref 4–33)
ALT FLD-CCNC: 390 U/L — HIGH (ref 4–33)
ANION GAP SERPL CALC-SCNC: 12 MMOL/L — SIGNIFICANT CHANGE UP (ref 7–14)
ANION GAP SERPL CALC-SCNC: 12 MMOL/L — SIGNIFICANT CHANGE UP (ref 7–14)
ANION GAP SERPL CALC-SCNC: 13 MMOL/L — SIGNIFICANT CHANGE UP (ref 7–14)
ANION GAP SERPL CALC-SCNC: 7 MMOL/L — SIGNIFICANT CHANGE UP (ref 7–14)
APTT BLD: 43.8 SEC — HIGH (ref 24.5–35.6)
APTT BLD: 51.3 SEC — HIGH (ref 24.5–35.6)
AST SERPL-CCNC: 267 U/L — HIGH (ref 4–32)
AST SERPL-CCNC: 321 U/L — HIGH (ref 4–32)
AST SERPL-CCNC: 413 U/L — HIGH (ref 4–32)
AST SERPL-CCNC: 512 U/L — HIGH (ref 4–32)
AST SERPL-CCNC: 516 U/L — HIGH (ref 4–32)
AST SERPL-CCNC: 565 U/L — HIGH (ref 4–32)
B-OH-BUTYR SERPL-SCNC: 0.2 MMOL/L — SIGNIFICANT CHANGE UP (ref 0–0.4)
B-OH-BUTYR SERPL-SCNC: 0.2 MMOL/L — SIGNIFICANT CHANGE UP (ref 0–0.4)
BASE EXCESS BLDV CALC-SCNC: -10.3 MMOL/L — LOW (ref -2–3)
BASE EXCESS BLDV CALC-SCNC: -3.3 MMOL/L — LOW (ref -2–3)
BASE EXCESS BLDV CALC-SCNC: -7.7 MMOL/L — LOW (ref -2–3)
BASE EXCESS BLDV CALC-SCNC: -8.6 MMOL/L — LOW (ref -2–3)
BASE EXCESS BLDV CALC-SCNC: -8.8 MMOL/L — LOW (ref -2–3)
BASOPHILS # BLD AUTO: 0.05 K/UL — SIGNIFICANT CHANGE UP (ref 0–0.2)
BASOPHILS NFR BLD AUTO: 0.3 % — SIGNIFICANT CHANGE UP (ref 0–2)
BILIRUB SERPL-MCNC: 0.4 MG/DL — SIGNIFICANT CHANGE UP (ref 0.2–1.2)
BILIRUB SERPL-MCNC: 0.5 MG/DL — SIGNIFICANT CHANGE UP (ref 0.2–1.2)
BILIRUB SERPL-MCNC: 0.5 MG/DL — SIGNIFICANT CHANGE UP (ref 0.2–1.2)
BILIRUB SERPL-MCNC: 0.6 MG/DL — SIGNIFICANT CHANGE UP (ref 0.2–1.2)
BLOOD GAS ARTERIAL - LYTES,HGB,ICA,LACT RESULT: SIGNIFICANT CHANGE UP
BLOOD GAS VENOUS COMPREHENSIVE RESULT: SIGNIFICANT CHANGE UP
BUN SERPL-MCNC: 22 MG/DL — SIGNIFICANT CHANGE UP (ref 7–23)
BUN SERPL-MCNC: 23 MG/DL — SIGNIFICANT CHANGE UP (ref 7–23)
BUN SERPL-MCNC: 24 MG/DL — HIGH (ref 7–23)
BUN SERPL-MCNC: 26 MG/DL — HIGH (ref 7–23)
CALCIUM SERPL-MCNC: 7.6 MG/DL — LOW (ref 8.4–10.5)
CALCIUM SERPL-MCNC: 8 MG/DL — LOW (ref 8.4–10.5)
CALCIUM SERPL-MCNC: 8 MG/DL — LOW (ref 8.4–10.5)
CALCIUM SERPL-MCNC: 8.2 MG/DL — LOW (ref 8.4–10.5)
CALCIUM SERPL-MCNC: 8.2 MG/DL — LOW (ref 8.4–10.5)
CALCIUM SERPL-MCNC: 8.4 MG/DL — SIGNIFICANT CHANGE UP (ref 8.4–10.5)
CHLORIDE BLDV-SCNC: 103 MMOL/L — SIGNIFICANT CHANGE UP (ref 96–108)
CHLORIDE BLDV-SCNC: 104 MMOL/L — SIGNIFICANT CHANGE UP (ref 96–108)
CHLORIDE BLDV-SCNC: 104 MMOL/L — SIGNIFICANT CHANGE UP (ref 96–108)
CHLORIDE BLDV-SCNC: 105 MMOL/L — SIGNIFICANT CHANGE UP (ref 96–108)
CHLORIDE SERPL-SCNC: 105 MMOL/L — SIGNIFICANT CHANGE UP (ref 98–107)
CHLORIDE SERPL-SCNC: 105 MMOL/L — SIGNIFICANT CHANGE UP (ref 98–107)
CHLORIDE SERPL-SCNC: 106 MMOL/L — SIGNIFICANT CHANGE UP (ref 98–107)
CHLORIDE SERPL-SCNC: 106 MMOL/L — SIGNIFICANT CHANGE UP (ref 98–107)
CHLORIDE SERPL-SCNC: 107 MMOL/L — SIGNIFICANT CHANGE UP (ref 98–107)
CHLORIDE SERPL-SCNC: 111 MMOL/L — HIGH (ref 98–107)
CO2 BLDV-SCNC: 18.5 MMOL/L — LOW (ref 22–26)
CO2 BLDV-SCNC: 19.1 MMOL/L — LOW (ref 22–26)
CO2 BLDV-SCNC: 21.1 MMOL/L — LOW (ref 22–26)
CO2 BLDV-SCNC: 22 MMOL/L — SIGNIFICANT CHANGE UP (ref 22–26)
CO2 BLDV-SCNC: 25.6 MMOL/L — SIGNIFICANT CHANGE UP (ref 22–26)
CO2 SERPL-SCNC: 15 MMOL/L — LOW (ref 22–31)
CO2 SERPL-SCNC: 16 MMOL/L — LOW (ref 22–31)
CO2 SERPL-SCNC: 17 MMOL/L — LOW (ref 22–31)
CO2 SERPL-SCNC: 18 MMOL/L — LOW (ref 22–31)
CO2 SERPL-SCNC: 18 MMOL/L — LOW (ref 22–31)
CO2 SERPL-SCNC: 19 MMOL/L — LOW (ref 22–31)
CREAT SERPL-MCNC: 0.43 MG/DL — LOW (ref 0.5–1.3)
CREAT SERPL-MCNC: 0.5 MG/DL — SIGNIFICANT CHANGE UP (ref 0.5–1.3)
CREAT SERPL-MCNC: 0.57 MG/DL — SIGNIFICANT CHANGE UP (ref 0.5–1.3)
CREAT SERPL-MCNC: 0.57 MG/DL — SIGNIFICANT CHANGE UP (ref 0.5–1.3)
CREAT SERPL-MCNC: 0.6 MG/DL — SIGNIFICANT CHANGE UP (ref 0.5–1.3)
CREAT SERPL-MCNC: 0.63 MG/DL — SIGNIFICANT CHANGE UP (ref 0.5–1.3)
DIGOXIN SERPL-MCNC: 0.5 NG/ML — LOW (ref 0.8–2)
EGFR: 100 ML/MIN/1.73M2 — SIGNIFICANT CHANGE UP
EGFR: 100 ML/MIN/1.73M2 — SIGNIFICANT CHANGE UP
EGFR: 103 ML/MIN/1.73M2 — SIGNIFICANT CHANGE UP
EGFR: 107 ML/MIN/1.73M2 — SIGNIFICANT CHANGE UP
EGFR: 98 ML/MIN/1.73M2 — SIGNIFICANT CHANGE UP
EGFR: 99 ML/MIN/1.73M2 — SIGNIFICANT CHANGE UP
EOSINOPHIL # BLD AUTO: 0.02 K/UL — SIGNIFICANT CHANGE UP (ref 0–0.5)
EOSINOPHIL NFR BLD AUTO: 0.1 % — SIGNIFICANT CHANGE UP (ref 0–6)
ESTIMATED AVERAGE GLUCOSE: 151 — SIGNIFICANT CHANGE UP
GAS PNL BLDV: 129 MMOL/L — LOW (ref 136–145)
GAS PNL BLDV: 130 MMOL/L — LOW (ref 136–145)
GAS PNL BLDV: 131 MMOL/L — LOW (ref 136–145)
GAS PNL BLDV: 131 MMOL/L — LOW (ref 136–145)
GAS PNL BLDV: SIGNIFICANT CHANGE UP
GAS PNL BLDV: SIGNIFICANT CHANGE UP
GLUCOSE BLDC GLUCOMTR-MCNC: 134 MG/DL — HIGH (ref 70–99)
GLUCOSE BLDC GLUCOMTR-MCNC: 134 MG/DL — HIGH (ref 70–99)
GLUCOSE BLDC GLUCOMTR-MCNC: 153 MG/DL — HIGH (ref 70–99)
GLUCOSE BLDC GLUCOMTR-MCNC: 162 MG/DL — HIGH (ref 70–99)
GLUCOSE BLDC GLUCOMTR-MCNC: 174 MG/DL — HIGH (ref 70–99)
GLUCOSE BLDC GLUCOMTR-MCNC: 178 MG/DL — HIGH (ref 70–99)
GLUCOSE BLDC GLUCOMTR-MCNC: 184 MG/DL — HIGH (ref 70–99)
GLUCOSE BLDC GLUCOMTR-MCNC: 188 MG/DL — HIGH (ref 70–99)
GLUCOSE BLDC GLUCOMTR-MCNC: 190 MG/DL — HIGH (ref 70–99)
GLUCOSE BLDC GLUCOMTR-MCNC: 195 MG/DL — HIGH (ref 70–99)
GLUCOSE BLDC GLUCOMTR-MCNC: 197 MG/DL — HIGH (ref 70–99)
GLUCOSE BLDC GLUCOMTR-MCNC: 201 MG/DL — HIGH (ref 70–99)
GLUCOSE BLDC GLUCOMTR-MCNC: 203 MG/DL — HIGH (ref 70–99)
GLUCOSE BLDC GLUCOMTR-MCNC: 205 MG/DL — HIGH (ref 70–99)
GLUCOSE BLDC GLUCOMTR-MCNC: 216 MG/DL — HIGH (ref 70–99)
GLUCOSE BLDC GLUCOMTR-MCNC: 301 MG/DL — HIGH (ref 70–99)
GLUCOSE BLDC GLUCOMTR-MCNC: 82 MG/DL — SIGNIFICANT CHANGE UP (ref 70–99)
GLUCOSE BLDC GLUCOMTR-MCNC: 89 MG/DL — SIGNIFICANT CHANGE UP (ref 70–99)
GLUCOSE BLDV-MCNC: 196 MG/DL — HIGH (ref 70–99)
GLUCOSE BLDV-MCNC: 226 MG/DL — HIGH (ref 70–99)
GLUCOSE BLDV-MCNC: SIGNIFICANT CHANGE UP MG/DL (ref 70–99)
GLUCOSE BLDV-MCNC: SIGNIFICANT CHANGE UP MG/DL (ref 70–99)
GLUCOSE SERPL-MCNC: 164 MG/DL — HIGH (ref 70–99)
GLUCOSE SERPL-MCNC: 186 MG/DL — HIGH (ref 70–99)
GLUCOSE SERPL-MCNC: 195 MG/DL — HIGH (ref 70–99)
GLUCOSE SERPL-MCNC: 222 MG/DL — HIGH (ref 70–99)
GLUCOSE SERPL-MCNC: 562 MG/DL — CRITICAL HIGH (ref 70–99)
GLUCOSE SERPL-MCNC: 96 MG/DL — SIGNIFICANT CHANGE UP (ref 70–99)
GRAM STN FLD: ABNORMAL
HAV IGM SER-ACNC: SIGNIFICANT CHANGE UP
HBV CORE IGM SER-ACNC: SIGNIFICANT CHANGE UP
HBV SURFACE AG SER-ACNC: SIGNIFICANT CHANGE UP
HCO3 BLDV-SCNC: 17 MMOL/L — LOW (ref 22–29)
HCO3 BLDV-SCNC: 18 MMOL/L — LOW (ref 22–29)
HCO3 BLDV-SCNC: 20 MMOL/L — LOW (ref 22–29)
HCO3 BLDV-SCNC: 20 MMOL/L — LOW (ref 22–29)
HCO3 BLDV-SCNC: 24 MMOL/L — SIGNIFICANT CHANGE UP (ref 22–29)
HCT VFR BLD CALC: 40.7 % — SIGNIFICANT CHANGE UP (ref 34.5–45)
HCT VFR BLD CALC: 40.9 % — SIGNIFICANT CHANGE UP (ref 34.5–45)
HCT VFR BLDA CALC: 32 % — LOW (ref 34.5–46.5)
HCT VFR BLDA CALC: 36 % — SIGNIFICANT CHANGE UP (ref 34.5–46.5)
HCT VFR BLDA CALC: 38 % — SIGNIFICANT CHANGE UP (ref 34.5–46.5)
HCT VFR BLDA CALC: 41 % — SIGNIFICANT CHANGE UP (ref 34.5–46.5)
HCV AB S/CO SERPL IA: 0.06 S/CO — SIGNIFICANT CHANGE UP (ref 0–0.99)
HCV AB SERPL-IMP: SIGNIFICANT CHANGE UP
HGB BLD CALC-MCNC: 10.6 G/DL — LOW (ref 11.7–16.1)
HGB BLD CALC-MCNC: 12 G/DL — SIGNIFICANT CHANGE UP (ref 11.7–16.1)
HGB BLD CALC-MCNC: 12.6 G/DL — SIGNIFICANT CHANGE UP (ref 11.7–16.1)
HGB BLD CALC-MCNC: 13.7 G/DL — SIGNIFICANT CHANGE UP (ref 11.7–16.1)
HGB BLD-MCNC: 12.8 G/DL — SIGNIFICANT CHANGE UP (ref 11.5–15.5)
HGB BLD-MCNC: 13 G/DL — SIGNIFICANT CHANGE UP (ref 11.5–15.5)
IANC: 15.93 K/UL — HIGH (ref 1.8–7.4)
IMM GRANULOCYTES NFR BLD AUTO: 1.3 % — HIGH (ref 0–0.9)
INR BLD: 3.02 RATIO — HIGH (ref 0.85–1.18)
INR BLD: 5.09 RATIO — CRITICAL HIGH (ref 0.85–1.18)
LACTATE BLDV-MCNC: 2 MMOL/L — SIGNIFICANT CHANGE UP (ref 0.5–2)
LACTATE BLDV-MCNC: 3.3 MMOL/L — HIGH (ref 0.5–2)
LACTATE BLDV-MCNC: 5.7 MMOL/L — CRITICAL HIGH (ref 0.5–2)
LACTATE BLDV-MCNC: 6.3 MMOL/L — CRITICAL HIGH (ref 0.5–2)
LACTATE SERPL-SCNC: 1.4 MMOL/L — SIGNIFICANT CHANGE UP (ref 0.5–2)
LYMPHOCYTES # BLD AUTO: 1.05 K/UL — SIGNIFICANT CHANGE UP (ref 1–3.3)
LYMPHOCYTES # BLD AUTO: 5.6 % — LOW (ref 13–44)
MAGNESIUM SERPL-MCNC: 1.9 MG/DL — SIGNIFICANT CHANGE UP (ref 1.6–2.6)
MAGNESIUM SERPL-MCNC: 2.3 MG/DL — SIGNIFICANT CHANGE UP (ref 1.6–2.6)
MAGNESIUM SERPL-MCNC: 2.4 MG/DL — SIGNIFICANT CHANGE UP (ref 1.6–2.6)
MAGNESIUM SERPL-MCNC: 2.4 MG/DL — SIGNIFICANT CHANGE UP (ref 1.6–2.6)
MAGNESIUM SERPL-MCNC: 4.6 MG/DL — HIGH (ref 1.6–2.6)
MCHC RBC-ENTMCNC: 28.6 PG — SIGNIFICANT CHANGE UP (ref 27–34)
MCHC RBC-ENTMCNC: 29.2 PG — SIGNIFICANT CHANGE UP (ref 27–34)
MCHC RBC-ENTMCNC: 31.3 GM/DL — LOW (ref 32–36)
MCHC RBC-ENTMCNC: 31.9 GM/DL — LOW (ref 32–36)
MCV RBC AUTO: 89.5 FL — SIGNIFICANT CHANGE UP (ref 80–100)
MCV RBC AUTO: 93.2 FL — SIGNIFICANT CHANGE UP (ref 80–100)
MONOCYTES # BLD AUTO: 1.5 K/UL — HIGH (ref 0–0.9)
MONOCYTES NFR BLD AUTO: 8 % — SIGNIFICANT CHANGE UP (ref 2–14)
MRSA PCR RESULT.: SIGNIFICANT CHANGE UP
NEUTROPHILS # BLD AUTO: 15.93 K/UL — HIGH (ref 1.8–7.4)
NEUTROPHILS NFR BLD AUTO: 84.7 % — HIGH (ref 43–77)
NRBC # BLD: 0 /100 WBCS — SIGNIFICANT CHANGE UP (ref 0–0)
NRBC # BLD: 0 /100 WBCS — SIGNIFICANT CHANGE UP (ref 0–0)
NRBC # FLD: 0 K/UL — SIGNIFICANT CHANGE UP (ref 0–0)
NRBC # FLD: 0 K/UL — SIGNIFICANT CHANGE UP (ref 0–0)
NT-PROBNP SERPL-SCNC: HIGH PG/ML
PCO2 BLDV: 40 MMHG — SIGNIFICANT CHANGE UP (ref 39–52)
PCO2 BLDV: 43 MMHG — SIGNIFICANT CHANGE UP (ref 39–52)
PCO2 BLDV: 51 MMHG — SIGNIFICANT CHANGE UP (ref 39–52)
PH BLDV: 7.19 — LOW (ref 7.32–7.43)
PH BLDV: 7.21 — LOW (ref 7.32–7.43)
PH BLDV: 7.21 — LOW (ref 7.32–7.43)
PH BLDV: 7.26 — LOW (ref 7.32–7.43)
PH BLDV: 7.28 — LOW (ref 7.32–7.43)
PHOSPHATE SERPL-MCNC: 1.7 MG/DL — LOW (ref 2.5–4.5)
PHOSPHATE SERPL-MCNC: 2.2 MG/DL — LOW (ref 2.5–4.5)
PHOSPHATE SERPL-MCNC: 2.3 MG/DL — LOW (ref 2.5–4.5)
PHOSPHATE SERPL-MCNC: 2.6 MG/DL — SIGNIFICANT CHANGE UP (ref 2.5–4.5)
PLATELET # BLD AUTO: 237 K/UL — SIGNIFICANT CHANGE UP (ref 150–400)
PLATELET # BLD AUTO: 293 K/UL — SIGNIFICANT CHANGE UP (ref 150–400)
PO2 BLDV: 22 MMHG — LOW (ref 25–45)
PO2 BLDV: 28 MMHG — SIGNIFICANT CHANGE UP (ref 25–45)
PO2 BLDV: 31 MMHG — SIGNIFICANT CHANGE UP (ref 25–45)
PO2 BLDV: 32 MMHG — SIGNIFICANT CHANGE UP (ref 25–45)
PO2 BLDV: < 20 MMHG — SIGNIFICANT CHANGE UP (ref 25–45)
POTASSIUM BLDV-SCNC: 3.6 MMOL/L — SIGNIFICANT CHANGE UP (ref 3.5–5.1)
POTASSIUM BLDV-SCNC: 3.9 MMOL/L — SIGNIFICANT CHANGE UP (ref 3.5–5.1)
POTASSIUM BLDV-SCNC: 4.2 MMOL/L — SIGNIFICANT CHANGE UP (ref 3.5–5.1)
POTASSIUM BLDV-SCNC: 4.9 MMOL/L — SIGNIFICANT CHANGE UP (ref 3.5–5.1)
POTASSIUM SERPL-MCNC: 3.7 MMOL/L — SIGNIFICANT CHANGE UP (ref 3.5–5.3)
POTASSIUM SERPL-MCNC: 3.8 MMOL/L — SIGNIFICANT CHANGE UP (ref 3.5–5.3)
POTASSIUM SERPL-MCNC: 4.2 MMOL/L — SIGNIFICANT CHANGE UP (ref 3.5–5.3)
POTASSIUM SERPL-MCNC: 4.2 MMOL/L — SIGNIFICANT CHANGE UP (ref 3.5–5.3)
POTASSIUM SERPL-MCNC: 4.4 MMOL/L — SIGNIFICANT CHANGE UP (ref 3.5–5.3)
POTASSIUM SERPL-MCNC: 4.4 MMOL/L — SIGNIFICANT CHANGE UP (ref 3.5–5.3)
POTASSIUM SERPL-SCNC: 3.7 MMOL/L — SIGNIFICANT CHANGE UP (ref 3.5–5.3)
POTASSIUM SERPL-SCNC: 3.8 MMOL/L — SIGNIFICANT CHANGE UP (ref 3.5–5.3)
POTASSIUM SERPL-SCNC: 4.2 MMOL/L — SIGNIFICANT CHANGE UP (ref 3.5–5.3)
POTASSIUM SERPL-SCNC: 4.2 MMOL/L — SIGNIFICANT CHANGE UP (ref 3.5–5.3)
POTASSIUM SERPL-SCNC: 4.4 MMOL/L — SIGNIFICANT CHANGE UP (ref 3.5–5.3)
POTASSIUM SERPL-SCNC: 4.4 MMOL/L — SIGNIFICANT CHANGE UP (ref 3.5–5.3)
PROT SERPL-MCNC: 5 G/DL — LOW (ref 6–8.3)
PROT SERPL-MCNC: 5.6 G/DL — LOW (ref 6–8.3)
PROT SERPL-MCNC: 5.6 G/DL — LOW (ref 6–8.3)
PROT SERPL-MCNC: 5.7 G/DL — LOW (ref 6–8.3)
PROT SERPL-MCNC: 5.8 G/DL — LOW (ref 6–8.3)
PROT SERPL-MCNC: 5.8 G/DL — LOW (ref 6–8.3)
PROTHROM AB SERPL-ACNC: 33 SEC — HIGH (ref 9.5–13)
PROTHROM AB SERPL-ACNC: 54.4 SEC — HIGH (ref 9.5–13)
RBC # BLD: 4.39 M/UL — SIGNIFICANT CHANGE UP (ref 3.8–5.2)
RBC # BLD: 4.55 M/UL — SIGNIFICANT CHANGE UP (ref 3.8–5.2)
RBC # FLD: 14.6 % — HIGH (ref 10.3–14.5)
RBC # FLD: 14.6 % — HIGH (ref 10.3–14.5)
S AUREUS DNA NOSE QL NAA+PROBE: SIGNIFICANT CHANGE UP
SAO2 % BLDV: 18.2 % — LOW (ref 67–88)
SAO2 % BLDV: 28 % — LOW (ref 67–88)
SAO2 % BLDV: 39.1 % — LOW (ref 67–88)
SAO2 % BLDV: 41.7 % — LOW (ref 67–88)
SAO2 % BLDV: 42.8 % — LOW (ref 67–88)
SODIUM SERPL-SCNC: 134 MMOL/L — LOW (ref 135–145)
SODIUM SERPL-SCNC: 135 MMOL/L — SIGNIFICANT CHANGE UP (ref 135–145)
SODIUM SERPL-SCNC: 135 MMOL/L — SIGNIFICANT CHANGE UP (ref 135–145)
SODIUM SERPL-SCNC: 136 MMOL/L — SIGNIFICANT CHANGE UP (ref 135–145)
SODIUM SERPL-SCNC: 136 MMOL/L — SIGNIFICANT CHANGE UP (ref 135–145)
SODIUM SERPL-SCNC: 137 MMOL/L — SIGNIFICANT CHANGE UP (ref 135–145)
SPECIMEN SOURCE: SIGNIFICANT CHANGE UP
TROPONIN T, HIGH SENSITIVITY RESULT: 62 NG/L — CRITICAL HIGH
WBC # BLD: 15.32 K/UL — HIGH (ref 3.8–10.5)
WBC # BLD: 18.8 K/UL — HIGH (ref 3.8–10.5)
WBC # FLD AUTO: 15.32 K/UL — HIGH (ref 3.8–10.5)
WBC # FLD AUTO: 18.8 K/UL — HIGH (ref 3.8–10.5)

## 2024-02-20 PROCEDURE — 76700 US EXAM ABDOM COMPLETE: CPT | Mod: 26

## 2024-02-20 PROCEDURE — 93010 ELECTROCARDIOGRAM REPORT: CPT

## 2024-02-20 PROCEDURE — 99255 IP/OBS CONSLTJ NEW/EST HI 80: CPT | Mod: GC

## 2024-02-20 PROCEDURE — 99291 CRITICAL CARE FIRST HOUR: CPT

## 2024-02-20 PROCEDURE — 71045 X-RAY EXAM CHEST 1 VIEW: CPT | Mod: 26

## 2024-02-20 RX ORDER — FUROSEMIDE 40 MG
40 TABLET ORAL ONCE
Refills: 0 | Status: COMPLETED | OUTPATIENT
Start: 2024-02-20 | End: 2024-02-20

## 2024-02-20 RX ORDER — MAGNESIUM SULFATE 500 MG/ML
1 VIAL (ML) INJECTION ONCE
Refills: 0 | Status: COMPLETED | OUTPATIENT
Start: 2024-02-20 | End: 2024-02-20

## 2024-02-20 RX ORDER — DEXTROSE 50 % IN WATER 50 %
12.5 SYRINGE (ML) INTRAVENOUS ONCE
Refills: 0 | Status: DISCONTINUED | OUTPATIENT
Start: 2024-02-20 | End: 2024-02-28

## 2024-02-20 RX ORDER — IBUPROFEN 200 MG
600 TABLET ORAL ONCE
Refills: 0 | Status: COMPLETED | OUTPATIENT
Start: 2024-02-20 | End: 2024-02-20

## 2024-02-20 RX ORDER — PIPERACILLIN AND TAZOBACTAM 4; .5 G/20ML; G/20ML
3.38 INJECTION, POWDER, LYOPHILIZED, FOR SOLUTION INTRAVENOUS ONCE
Refills: 0 | Status: COMPLETED | OUTPATIENT
Start: 2024-02-20 | End: 2024-02-20

## 2024-02-20 RX ORDER — AMIODARONE HYDROCHLORIDE 400 MG/1
0.5 TABLET ORAL
Qty: 450 | Refills: 0 | Status: COMPLETED | OUTPATIENT
Start: 2024-02-21 | End: 2024-02-21

## 2024-02-20 RX ORDER — SODIUM CHLORIDE 9 MG/ML
1000 INJECTION, SOLUTION INTRAVENOUS ONCE
Refills: 0 | Status: COMPLETED | OUTPATIENT
Start: 2024-02-20 | End: 2024-02-20

## 2024-02-20 RX ORDER — METOPROLOL TARTRATE 50 MG
25 TABLET ORAL EVERY 12 HOURS
Refills: 0 | Status: DISCONTINUED | OUTPATIENT
Start: 2024-02-20 | End: 2024-02-20

## 2024-02-20 RX ORDER — INSULIN LISPRO 100/ML
VIAL (ML) SUBCUTANEOUS AT BEDTIME
Refills: 0 | Status: DISCONTINUED | OUTPATIENT
Start: 2024-02-20 | End: 2024-02-28

## 2024-02-20 RX ORDER — PIPERACILLIN AND TAZOBACTAM 4; .5 G/20ML; G/20ML
3.38 INJECTION, POWDER, LYOPHILIZED, FOR SOLUTION INTRAVENOUS EVERY 8 HOURS
Refills: 0 | Status: DISCONTINUED | OUTPATIENT
Start: 2024-02-21 | End: 2024-02-22

## 2024-02-20 RX ORDER — SODIUM CHLORIDE 9 MG/ML
1000 INJECTION, SOLUTION INTRAVENOUS
Refills: 0 | Status: DISCONTINUED | OUTPATIENT
Start: 2024-02-20 | End: 2024-02-28

## 2024-02-20 RX ORDER — CALCIUM GLUCONATE 100 MG/ML
1 VIAL (ML) INTRAVENOUS ONCE
Refills: 0 | Status: COMPLETED | OUTPATIENT
Start: 2024-02-20 | End: 2024-02-20

## 2024-02-20 RX ORDER — AMIODARONE HYDROCHLORIDE 400 MG/1
150 TABLET ORAL ONCE
Refills: 0 | Status: COMPLETED | OUTPATIENT
Start: 2024-02-20 | End: 2024-02-20

## 2024-02-20 RX ORDER — INSULIN LISPRO 100/ML
VIAL (ML) SUBCUTANEOUS
Refills: 0 | Status: DISCONTINUED | OUTPATIENT
Start: 2024-02-20 | End: 2024-02-28

## 2024-02-20 RX ORDER — SODIUM,POTASSIUM PHOSPHATES 278-250MG
2 POWDER IN PACKET (EA) ORAL ONCE
Refills: 0 | Status: COMPLETED | OUTPATIENT
Start: 2024-02-20 | End: 2024-02-20

## 2024-02-20 RX ORDER — POTASSIUM CHLORIDE 20 MEQ
20 PACKET (EA) ORAL ONCE
Refills: 0 | Status: COMPLETED | OUTPATIENT
Start: 2024-02-20 | End: 2024-02-20

## 2024-02-20 RX ORDER — AMIODARONE HYDROCHLORIDE 400 MG/1
1 TABLET ORAL
Qty: 450 | Refills: 0 | Status: DISCONTINUED | OUTPATIENT
Start: 2024-02-20 | End: 2024-02-22

## 2024-02-20 RX ORDER — DEXTROSE 50 % IN WATER 50 %
25 SYRINGE (ML) INTRAVENOUS ONCE
Refills: 0 | Status: DISCONTINUED | OUTPATIENT
Start: 2024-02-20 | End: 2024-02-28

## 2024-02-20 RX ORDER — INSULIN GLARGINE 100 [IU]/ML
6 INJECTION, SOLUTION SUBCUTANEOUS
Refills: 0 | Status: DISCONTINUED | OUTPATIENT
Start: 2024-02-20 | End: 2024-02-21

## 2024-02-20 RX ORDER — METOPROLOL TARTRATE 50 MG
5 TABLET ORAL ONCE
Refills: 0 | Status: COMPLETED | OUTPATIENT
Start: 2024-02-20 | End: 2024-02-20

## 2024-02-20 RX ORDER — DEXTROSE 50 % IN WATER 50 %
50 SYRINGE (ML) INTRAVENOUS ONCE
Refills: 0 | Status: COMPLETED | OUTPATIENT
Start: 2024-02-20 | End: 2024-02-20

## 2024-02-20 RX ORDER — DIGOXIN 250 MCG
125 TABLET ORAL EVERY OTHER DAY
Refills: 0 | Status: DISCONTINUED | OUTPATIENT
Start: 2024-02-21 | End: 2024-02-27

## 2024-02-20 RX ORDER — METOPROLOL TARTRATE 50 MG
25 TABLET ORAL ONCE
Refills: 0 | Status: COMPLETED | OUTPATIENT
Start: 2024-02-20 | End: 2024-02-20

## 2024-02-20 RX ORDER — GLUCAGON INJECTION, SOLUTION 0.5 MG/.1ML
1 INJECTION, SOLUTION SUBCUTANEOUS ONCE
Refills: 0 | Status: DISCONTINUED | OUTPATIENT
Start: 2024-02-20 | End: 2024-02-28

## 2024-02-20 RX ORDER — DEXTROSE 50 % IN WATER 50 %
15 SYRINGE (ML) INTRAVENOUS ONCE
Refills: 0 | Status: DISCONTINUED | OUTPATIENT
Start: 2024-02-20 | End: 2024-02-28

## 2024-02-20 RX ORDER — METOPROLOL TARTRATE 50 MG
5 TABLET ORAL ONCE
Refills: 0 | Status: DISCONTINUED | OUTPATIENT
Start: 2024-02-20 | End: 2024-02-20

## 2024-02-20 RX ADMIN — Medication 25 MILLIGRAM(S): at 17:04

## 2024-02-20 RX ADMIN — Medication 50 MILLILITER(S): at 00:23

## 2024-02-20 RX ADMIN — AMIODARONE HYDROCHLORIDE 33.3 MG/MIN: 400 TABLET ORAL at 18:38

## 2024-02-20 RX ADMIN — Medication 200 MILLIGRAM(S): at 20:08

## 2024-02-20 RX ADMIN — Medication 40 MILLIGRAM(S): at 17:58

## 2024-02-20 RX ADMIN — Medication 5 MILLIGRAM(S): at 12:35

## 2024-02-20 RX ADMIN — Medication 600 MILLIGRAM(S): at 02:53

## 2024-02-20 RX ADMIN — INSULIN HUMAN 1 UNIT(S)/HR: 100 INJECTION, SOLUTION SUBCUTANEOUS at 01:13

## 2024-02-20 RX ADMIN — INSULIN GLARGINE 6 UNIT(S): 100 INJECTION, SOLUTION SUBCUTANEOUS at 18:23

## 2024-02-20 RX ADMIN — Medication 25 MILLIGRAM(S): at 12:35

## 2024-02-20 RX ADMIN — Medication 20 MILLIEQUIVALENT(S): at 20:08

## 2024-02-20 RX ADMIN — Medication 100 GRAM(S): at 22:12

## 2024-02-20 RX ADMIN — Medication 100 GRAM(S): at 02:09

## 2024-02-20 RX ADMIN — Medication 2: at 22:23

## 2024-02-20 RX ADMIN — INSULIN HUMAN 1 UNIT(S)/HR: 100 INJECTION, SOLUTION SUBCUTANEOUS at 06:15

## 2024-02-20 RX ADMIN — AMIODARONE HYDROCHLORIDE 618 MILLIGRAM(S): 400 TABLET ORAL at 18:25

## 2024-02-20 RX ADMIN — Medication 600 MILLIGRAM(S): at 02:43

## 2024-02-20 RX ADMIN — AMIODARONE HYDROCHLORIDE 33.3 MG/MIN: 400 TABLET ORAL at 20:09

## 2024-02-20 RX ADMIN — Medication 25 MILLIGRAM(S): at 01:13

## 2024-02-20 RX ADMIN — Medication 5 MILLIGRAM(S): at 01:10

## 2024-02-20 RX ADMIN — PIPERACILLIN AND TAZOBACTAM 25 GRAM(S): 4; .5 INJECTION, POWDER, LYOPHILIZED, FOR SOLUTION INTRAVENOUS at 18:44

## 2024-02-20 RX ADMIN — SODIUM CHLORIDE 1000 MILLILITER(S): 9 INJECTION, SOLUTION INTRAVENOUS at 05:01

## 2024-02-20 RX ADMIN — INSULIN HUMAN 1.5 UNIT(S)/HR: 100 INJECTION, SOLUTION SUBCUTANEOUS at 03:13

## 2024-02-20 RX ADMIN — Medication 2 PACKET(S): at 22:12

## 2024-02-20 RX ADMIN — PIPERACILLIN AND TAZOBACTAM 200 GRAM(S): 4; .5 INJECTION, POWDER, LYOPHILIZED, FOR SOLUTION INTRAVENOUS at 16:19

## 2024-02-20 NOTE — CHART NOTE - NSCHARTNOTEFT_GEN_A_CORE
Cardiology was consulted to reassess patient given rising lactate, and concern for volume overload.     Patient was on IV fluids for management of DKA earlier, she had a TTE which as difficult as it was to interpret showed a moderate reduction in LVEF to ~37% while patient was particularly tachycardic in a.fib.   Vitals at bedside were : /92 with HR ~130 in rapid atrial fibrillation    On exam, patient's extremities were warm without edema, she had diffuse bilateral expiratory wheezing on lung exam with a tachycardic heart exam with notable mechanical click.     Labs were significant for: BNP 10, 376 and a lactate of 2.0    Plan  - Bedside U/S of IVC was difficult to clearly evaluate  - IVC at the time of TTE personally reviewed: IVC measures ~ 1.75cm with <50% respiratory variation giving an estimated RA pressure of about 8mmHg  - Agree with trial of IV lasix 40mg  - Close monitoring respiratory response: could benefit from nebulizer treatment if diuresis does not help her wheezing  - Given poor tolerance of lopressor pushes, can trial amiodarone bolus as patient has been compliant with AC for Mechanical valve INR 3.02  - Can start PO lopressor 25mg Q6 with hold parameters for HR <60      Lynsey Rodriguez MD  Cardiology Fellow

## 2024-02-20 NOTE — CONSULT NOTE ADULT - ATTENDING COMMENTS
68F DM2, metabolic acidosis in ICU including mild EDKA and lactic acidosis.  On insulin drip low rate can transition with low dose Lantus.  Will follow to finalize dc recommendations.  Patient is high risk and high level decision making, requiring ICU level of care.    Nevaeh Grant MD  Division of Endocrinology  Pager: [phone]    If after 6PM or before 9AM, or on weekends/holidays, please call endocrine answering service for assistance ([phone]).  For nonurgent matters email [email or web address] for assistance.

## 2024-02-20 NOTE — PROGRESS NOTE ADULT - CRITICAL CARE ATTENDING COMMENT
67 yo female with h/o Afib,  mechanical Ao and mitral valve 2/2 to rheumatic disease,  on coumadin, digoxin, metoprolol, DM on Jardiance, presenting with waxing and waning flu'like illness x 2 week.     Patient also with dysuria, hematuria and some increased urinary frequency. Patient also noted to be septic, febril, in RVR requiring pressors. Previously on dig but was concern for dig toxic last admission. Also with LUISA, transaminitis and acidosis, concerning for possible euglycemic DKA.     Modest improved in BHB, but increasing lactate, TTE with new severe LV dysfunction, no acute ischemia on EKG and trops minimal and trended down.     # HFrEF  # acute UTI  # Lactic acidosis  # DKA  # shock on pressors -resolved  # afib with RVR  # LUISA  # transaminitis  - Shock on pressors now off, but still with Afib with RVR. New LV dysfunction in the setting of tachycardia and acute UTI/DKA  - Euglycemic dka, acidosis out of proportion to BHB which is improved. C/W insulin gtt  will transtiona to basal bolus.  - Lactic acidosis was pending CT scan but now normalized, in the setting of new LV dysfunction and rvr. Continue to trend.   - LUISA improved continue to monitor  - Transaminitis, US with fatty liver, pending hepatitis labs. possible 2/2 to congestion. Continue to monitor.   - Afib with RVR- symptomatically worse with IV lopressions likely 2/2 to decompensated HFrEF. Will give lasix. Hold BBs, on dig at home, will give amiodarone. patient on chronic A/C for mechanical valve for years.   - F/U cards recs  - Continue to monitor on tele.   - On Zosyn for UTI, f/u with cultures. Low threshold for Pan- CT if lactate worsens.

## 2024-02-20 NOTE — PROGRESS NOTE ADULT - ASSESSMENT
Assessment: 68 F PMH Mitral regurgitation, rheumatic heart disease, atrial fibrillation, HTN, on Digoxin, Warfarin, DM 2 on Jardiance presenting with flu like symptoms, tachycardia, and hypotension requiring pressure support. Likely mutlifactorial shock, primarily septic with component of cardiogenic.     Neuro:  Alert and orientated x3 at baseline  No active issues    Cardiovascular:   #Atrial fibrillation with RVR  #Mitral valvue regurgitation  #Mechanical aortic valve replacement  #History of rheumatic heart disease  #Shock, Septic vs cardiogenic  - Currently on Josep gtt, wean as tolerated  - Mantain MAP>65  - Lopressors PRN for sustained afib with RVR, can consider synchronized cardioversion if hypotension worsens during afib with RVR  - TTE from 11/2023 shows   - Repeatt TTE  - Cardiology consult  - Continue Warfarin, INR goal 2.5-3.5  - Digoxin by level    #HTN  - Hold home Enalapril in the setting of hypotension  #HLD  - Continue Atorvastatin 20 mg daily    Pulmonology  - Satuyrating well on room air, protectin airway    GI  RICHY  If BHB negative, CC/DASH diet  If BHB positive, NPO until anion gap closes     Renal  #Anion gap metabolic acidosis  #Lactic acidosis  #? Ketoacidosis  - S/p 3L IVF  - Monitor VBGs q 12  - Treat underlying cause, possible Euglycemic DKA vs Sepsis   #LUISA  - Cr from 11/2023 of 0.39  - Admission Cr of .89  - LUISA likely pre-renal in the setting of shock  - Urine electrolytes, UA, Serum osmolality  - Monitor UOP and Cr  - Adequate fluid hydration    ID  #Septic shock in the setting of URI vs UTI    - Continue Ceftriaxone 1g daily for 14 days (adjust as per cultures)   - UA and urine culture  - Blood culture pending   - RVP negative  - Complained of burning on urination on 2/15, No CVA tenderness     Endo  #?Euglycemic DKA  #DM2  - Pending BHB  - History of type DM2 on Jardiance, now presenting with infection, polyuria, polydipsia  - If BHB positive, begin insulin gtt, potssium repletion with q4 hour BMPs, and IVF per DKA protocol. Endocrine consult.  - If BHB negative, sliding scale  - Treat underlying infectious etiology     Heme/Onc  - Continue Warfarin with INR goal of 2.5-3.5     Ethics  Full Code         Assessment: 68 F PMH Mitral regurgitation, rheumatic heart disease, atrial fibrillation, HTN, on Digoxin, Warfarin, DM 2 on Jardiance presenting with flu like symptoms, tachycardia, and hypotension requiring pressure support. Likely mutlifactorial shock, primarily septic with component of cardiogenic.     Neuro:  Alert and orientated x3 at baseline  No active issues    Cardiovascular:   #Atrial fibrillation with RVR  #Mitral valvue regurgitation  #Mechanical aortic valve replacement  #History of rheumatic heart disease  #Shock, Septic vs cardiogenic  - Josep gtt off since   - Lopressor PO BID increased to 25mg TID for persistent Afib RVR  - Lopressors PRN for sustained afib with RVR   - consider synchronized cardioversion if hypotension worsens during afib with RVR  - TTE from 11/2023 shows   - Repeatt TTE  - Cardiology consult  - Continue Warfarin, INR goal 2.5-3.5  - Digoxin by level    #HTN  - Hold home Enalapril in the setting of hypotension  #HLD  - Continue Atorvastatin 20 mg daily    Pulmonology  - Satuyrating well on room air, protectin airway    GI  RICHY  If BHB negative, CC/DASH diet  If BHB positive, NPO until anion gap closes     Renal  #Anion gap metabolic acidosis  #Lactic acidosis  #? Ketoacidosis  - S/p 3L IVF  - Monitor VBGs q 12  - Treat underlying cause, possible Euglycemic DKA vs Sepsis   #LUISA  - Cr from 11/2023 of 0.39  - Admission Cr of .89  - LUISA likely pre-renal in the setting of shock  - Urine electrolytes, UA, Serum osmolality  - Monitor UOP and Cr  - Adequate fluid hydration    ID  #Septic shock in the setting of URI vs UTI    - Continue Ceftriaxone 1g daily for 14 days (adjust as per cultures)   - UA and urine culture  - Blood culture pending   - RVP negative  - Complained of burning on urination on 2/15, No CVA tenderness     Endo  #?Euglycemic DKA  #DM2  - Pending BHB  - History of type DM2 on Jardiance, now presenting with infection, polyuria, polydipsia  - If BHB positive, begin insulin gtt, potssium repletion with q4 hour BMPs, and IVF per DKA protocol. Endocrine consult.  - If BHB negative, sliding scale  - Treat underlying infectious etiology     Heme/Onc  - Continue Warfarin with INR goal of 2.5-3.5     Ethics  Full Code         Assessment: 68 F PMH Mitral regurgitation, rheumatic heart disease, atrial fibrillation, HTN, on Digoxin, Warfarin, DM 2 on Jardiance presenting with flu like symptoms, tachycardia, and hypotension requiring pressure support. Likely mutlifactorial shock, primarily septic with component of cardiogenic.     Neuro:  Alert and orientated x3 at baseline  No active issues    Cardiovascular:   #Atrial fibrillation with RVR  #Mitral valvue regurgitation  #Mechanical aortic valve replacement  #History of rheumatic heart disease  #Shock, Septic vs cardiogenic  - Josep gtt off since   - Lopressor PO BID increased to 25mg TID for persistent Afib RVR  - s/p Lopressor IVP PRN   - TTE from 11/2023 shows The LV cavity is small. LV systolic function is severely decreased with EF 37 %   - TTE 2/20 pending  - Continue Warfarin, INR goal 2.5-3.5  - Digoxin by level    #HTN  - Hold home Enalapril in the setting of hypotension  #HLD  - Continue Atorvastatin 20 mg daily    Pulmonology  - Saturating well on room air, protectin airway    GI  RICHY  If BHB negative, CC/DASH diet  If BHB positive, NPO until anion gap closes     Renal  #Anion gap metabolic acidosis  #Lactic acidosis  #? Ketoacidosis  - S/p 3L IVF  - Monitor VBGs q 12  - Treat underlying cause, possible Euglycemic DKA vs Sepsis   #LUISA  - Cr from 11/2023 of 0.39  - Admission Cr of .89  - LUISA likely pre-renal in the setting of shock  - Urine electrolytes, UA, Serum osmolality  - Monitor UOP and Cr  - Adequate fluid hydration    ID  #Septic shock in the setting of URI vs UTI    - Continue Ceftriaxone 1g daily for 14 days (adjust as per cultures)   - UA and urine culture  - Blood culture pending   - RVP negative  - Complained of burning on urination on 2/15, No CVA tenderness     Endo  #?Euglycemic DKA  #DM2  - Pending BHB  - History of type DM2 on Jardiance, now presenting with infection, polyuria, polydipsia  - If BHB positive, begin insulin gtt, potssium repletion with q4 hour BMPs, and IVF per DKA protocol. Endocrine consult.  - If BHB negative, sliding scale  - Treat underlying infectious etiology     Heme/Onc  - Continue Warfarin with INR goal of 2.5-3.5     Ethics  Full Code         Assessment: 68 F PMH Mitral regurgitation, rheumatic heart disease, atrial fibrillation, HTN, on Digoxin, Warfarin, DM 2 on Jardiance presenting with flu like symptoms, tachycardia, and hypotension requiring pressure support. Likely mutlifactorial shock, primarily septic with component of cardiogenic.     Neuro:  Alert and orientated x3 at baseline  No active issues    Cardiovascular:   #Atrial fibrillation with RVR  #Mitral valvue regurgitation  #Mechanical aortic valve replacement  #History of rheumatic heart disease  #Shock, Septic vs cardiogenic  - Josep gtt off since   - Lopressor PO BID increased to 25mg TID for persistent Afib RVR  - s/p Lopressor IVP PRN   - TTE from 11/2023 shows The LV cavity is small. LV systolic function is severely decreased with EF 37 %   - TTE 2/20 pending  - Continue Warfarin, INR goal 2.5-3.5   - Digoxin by level    #HTN  - Hold home Enalapril in the setting of hypotension  #HLD  - Continue Atorvastatin 20 mg daily    Pulmonology  - Saturating well on room air, protectin airway    GI  RICHY  If BHB negative, CC/DASH diet  If BHB positive, NPO until anion gap closes     Renal  #Anion gap metabolic acidosis  #Lactic acidosis  #? Ketoacidosis  - S/p 3L IVF  - Monitor VBGs q 12  - Treat underlying cause, possible Euglycemic DKA vs Sepsis   - lactatemia   #LUISA  - Cr from 11/2023 of 0.39  - Admission Cr of .89  - LUISA likely pre-renal in the setting of shock  - Urine electrolytes, UA, Serum osmolality  - Monitor UOP and Cr  - Adequate fluid hydration    ID  #Septic shock in the setting of URI vs UTI    - Continue Ceftriaxone 1g daily for 14 days (adjust as per cultures)   - UA and urine culture  - Blood culture pending   - RVP negative  - Complained of burning on urination on 2/15, No CVA tenderness     Endo  #?Euglycemic DKA  #DM2  - Pending BHB  - History of type DM2 on Jardiance, now presenting with infection, polyuria, polydipsia  - If BHB positive, begin insulin gtt, potssium repletion with q4 hour BMPs, and IVF per DKA protocol. Endocrine consult.  - If BHB negative, sliding scale  - Treat underlying infectious etiology     Heme/Onc  - Continue Warfarin with INR goal of 2.5-3.5     Ethics  Full Code         Assessment: 68 F PMH Mitral regurgitation, rheumatic heart disease, atrial fibrillation, HTN, on Digoxin, Warfarin, DM 2 on Jardiance presenting with flu like symptoms, tachycardia, and hypotension requiring pressure support. Likely mutlifactorial shock, primarily septic with component of cardiogenic.     Neuro:  Alert and orientated x3 at baseline  No active issues    Cardiovascular:   #Atrial fibrillation with RVR  #Mitral valvue regurgitation  #Mechanical aortic valve replacement  #History of rheumatic heart disease  #Shock, Septic vs cardiogenic  - required pressors likely iso shock, Josep gtt weaned off since 12am  - Lopressor PO BID increased to 25mg TID for persistent Afib RVR  - s/p Lopressor IVP PRN   - TTE from 11/2023 shows The LV cavity is small. LV systolic function is severely decreased with EF 37 %   - TTE 2/20 pending  - Continue Warfarin, INR goal 2.5-3.5   - c/w digoxin every other day  - Digoxin by level, draw in a few days    #HTN  - Hold home Enalapril in the setting of hypotension  #HLD  - Continue Atorvastatin 20 mg daily    Pulmonology  - CXR shows clear lungs  - POCUS shows b/i b lines  - Saturating well on room air, protecting airway    GI  RICHY  Diet: NPO   - continue to keep NPO until anion gap closes  - bowel regimen      Renal  #Anion gap metabolic acidosis  #Lactic acidosis  #? Ketoacidosis  - S/p 3L IVF  - Monitor VBGs q 12  - Treat underlying cause, possible Euglycemic DKA vs Sepsis   - hyperlactatemia likely iso renal/liver dysfunction metformin vs urosepsis, continue to trend  #LUISA  - Cr from 11/2023 of 0.39  - Admission Cr of .89  - LUISA likely pre-renal in the setting of shock, now slightly downtrending  - Urine electrolytes, UA, Serum osmolality  - Monitor UOP and Cr  - Adequate fluid hydration    ID  #Septic shock in the setting of URI vs UTI    - Complained of burning on urination on 2/15, No CVA tenderness   - leukocytosis 15, Tmax 100.6F  - UA positive, f/u urine culture  - f/u blood culture  - s/p Azithromyin x1, continue Ceftriaxone 1g daily for 14 days (adjust as per cultures)   - RVP negative      Endo  #?Euglycemic DKA  #DM2  pH 7.27 AG 20, Bicarb 15, Glucose 201, Lactate 4  - s/p 4 L IVF  - Pending BHB  - History of type DM2 on Jardiance, now presenting with infection, polyuria, polydipsia  - c/w insulin gtt and VF per DKA protocol.   - Endocrine consult.  - electrolytes repletion with q4 hour BMPs    Heme/Onc  - Continue Warfarin with INR goal of 2.5-3.5   - daily coags    Ethics  Full Code         Assessment: 68 F PMH Mitral regurgitation, rheumatic heart disease, atrial fibrillation, HTN, on Digoxin, Warfarin, DM 2 on Jardiance presenting with flu like symptoms, tachycardia, and hypotension requiring pressure support. Likely mutlifactorial shock, primarily septic with component of cardiogenic.     Neuro:  Alert and orientated x3 at baseline  No active issues    Cardiovascular:   #Atrial fibrillation with RVR  #Mitral valvue regurgitation  #Mechanical aortic valve replacement  #History of rheumatic heart disease  #Shock, Septic vs cardiogenic  - required pressors likely iso shock, Josep gtt weaned off since 12am  - Lopressor PO BID increased to 25mg TID for persistent Afib RVR  - s/p Lopressor IVP PRN   - TTE from 11/2023 shows The LV cavity is small. LV systolic function is severely decreased with EF 37 %   - TTE 2/20 pending  - Continue Warfarin, INR goal 2.5-3.5   - c/w digoxin every other day  - Digoxin by level, draw in a few days    #HTN  - Hold home Enalapril in the setting of hypotension  #HLD  - Continue Atorvastatin 20 mg daily    Pulmonology  - CXR shows clear lungs  - POCUS shows b/i b lines  - Saturating well on room air, protecting airway    GI  RICHY  # Transaminitis   , , Alk phos and Bili normal   - unknown etiology  - f/u hepatitis panel  - RUQ US    Diet: NPO   - continue to keep NPO until anion gap closes  - bowel regimen        Renal  #Anion gap metabolic acidosis  #Lactic acidosis  #? Ketoacidosis  - S/p 3L IVF  - Monitor VBGs q 12  - Treat underlying cause, possible Euglycemic DKA vs Sepsis   - hyperlactatemia likely iso renal/liver dysfunction metformin vs urosepsis, continue to trend  #LUISA  - Cr from 11/2023 of 0.39  - Admission Cr of .89  - LUISA likely pre-renal in the setting of shock, now slightly downtrending  - Urine electrolytes, UA, Serum osmolality  - Monitor UOP and Cr  - Adequate fluid hydration    ID  #Septic shock in the setting of URI vs UTI    - Complained of burning on urination on 2/15, No CVA tenderness   - leukocytosis 15, Tmax 100.6F  - UA positive, f/u urine culture  - f/u blood culture  - s/p Azithromyin x1, continue Ceftriaxone 1g daily for 14 days (adjust as per cultures)   - RVP negative      Endo  #?Euglycemic DKA  #DM2  pH 7.27 AG 20, Bicarb 15, Glucose 201, Lactate 4  - s/p 4 L IVF  - Pending BHB  - History of type DM2 on Jardiance, now presenting with infection, polyuria, polydipsia  - c/w insulin gtt and VF per DKA protocol.   - Endocrine consult.  - electrolytes repletion with q4 hour BMPs    Heme/Onc  - Continue Warfarin with INR goal of 2.5-3.5   - daily coags    Ethics  Full Code

## 2024-02-20 NOTE — CONSULT NOTE ADULT - SUBJECTIVE AND OBJECTIVE BOX
HPI:  68 year old female with PMH Mitral regurgitation, mechanical aortic valve replacement, rheumatic heart disease, atrial fibrillation, HTN, on Digoxin, Warfarin, DM 2 on Jardiance presenting with flu like symptoms from urgent care. 2 weeks ago patient started having cough, congestion, and intermittent fevers which improved. On 02/12 worsening URI with white psutum production. On 02/17 noticed dysuria and polyuria. Fevers improve with Tylenol. Went to urgent care today and found to be in Afib with RVR in the 180s and sent to the ED. No CP, SOB, abd pain, vomiting, diarrhea, edema, lightheadedness or dizziness, and palpitations.     In the ED Afebrile, BP of 90/60, HR in the 160s. Saturating well on room air. Received Acetaminophen, 2L IV bolus, 3 neosticks, started on Phenylephrine drip, and Lopressor 5 mg. Given Azithromycin and Ceftriaxone as well. Blood cultures pending.     Surgical History: Mitral valve repair, aortic valve repair  Allergies: NKDA   FH: None  SH: No smoking, no drinking, no illicit drug use    (19 Feb 2024 19:50)      Consulted for: Mariel  This is a 67 yo F /w a PMh of Mechanical aortic valve, rheumatic heart diesase, afib, HTN, and DM2 who presents with euglycemic DKA and afib with RVR.    Initial labs show glucose 203, CO2 15, AG 20, BHB 2.8  Patient placed on insulin drip intermittently overnight  DKA now resolved with BHB 0.2    Patient has DM2 for  Managed by  Current home meds are:  Jardiance 10mg daily  Janumet 50-1000mg BID        PAST MEDICAL & SURGICAL HISTORY:  T2DM (type 2 diabetes mellitus)      Chronic atrial fibrillation      H/O mitral valve replacement with mechanical valve      H/O mechanical aortic valve replacement          FAMILY HISTORY:  No pertinent family history in first degree relatives        Social History:    Home Medications:  atorvastatin 20 mg oral tablet: 1 tab(s) orally once a day (31 Oct 2023 10:24)  enalapril 2.5 mg oral tablet: 1 tab(s) orally once a day (31 Oct 2023 10:23)  Janumet 50 mg-1000 mg oral tablet: 2 tab(s) orally once a day (31 Oct 2023 10:23)  Jardiance 10 mg oral tablet: 1 tab(s) orally once a day (31 Oct 2023 10:23)  metoprolol tartrate 25 mg oral tablet: 1 tab(s) orally 2 times a day (31 Oct 2023 10:23)  warfarin 2.5 mg oral tablet: 1 tab(s) orally 2 tabs on M/W/F; 1 tab on Tu/Th/Sa/Cheung (31 Oct 2023 10:23)      MEDICATIONS  (STANDING):  cefTRIAXone   IVPB 1000 milliGRAM(s) IV Intermittent Once  dextrose 5% + lactated ringers. 1000 milliLiter(s) (150 mL/Hr) IV Continuous <Continuous>  insulin regular Infusion 1 Unit(s)/Hr (1 mL/Hr) IV Continuous <Continuous>  metoprolol tartrate 25 milliGRAM(s) Oral every 12 hours  metoprolol tartrate Injectable 5 milliGRAM(s) IV Push once  phenylephrine    Infusion 1 MICROgram(s)/kG/Min (7.97 mL/Hr) IV Continuous <Continuous>    MEDICATIONS  (PRN):  guaiFENesin Oral Liquid (Sugar-Free) 200 milliGRAM(s) Oral every 6 hours PRN Cough      Allergies    No Known Allergies    Intolerances      Review of Systems:  Constitutional: No fever  Eyes: No blurry vision  Neuro: No tremors  HEENT: No pain  Cardiovascular: No chest pain, palpitations  Respiratory: No SOB, no cough  GI: No nausea, vomiting, abdominal pain  : No dysuria  Skin: no rash  Psych: no depression  Endocrine: no polyuria, polydipsia  Hem/lymph: no swelling  Osteoporosis: no fractures    PHYSICAL EXAM:  VITALS: T(C): 36.6 (02-20-24 @ 08:00)  T(F): 97.8 (02-20-24 @ 08:00), Max: 101.1 (02-19-24 @ 15:03)  HR: 123 (02-20-24 @ 10:00) (100 - 160)  BP: 126/82 (02-20-24 @ 10:00) (58/49 - 126/82)  RR:  (16 - 36)  SpO2:  (96% - 100%)  Wt(kg): --  GENERAL: NAD  EYES: No proptosis, no lid lag, anicteric  THYROID: Normal size, no palpable nodules  RESPIRATORY: Clear to auscultation bilaterally  CARDIOVASCULAR: Regular rate and rhythm  GI: Soft, nontender, non distended  EXT: b/l feet without wounds; 2+ pulses  PSYCH: Alert and oriented x 3, reactive mood    POCT Blood Glucose.: 190 mg/dL (02-20-24 @ 10:08)  POCT Blood Glucose.: 201 mg/dL (02-20-24 @ 09:16)  POCT Blood Glucose.: 188 mg/dL (02-20-24 @ 08:10)  POCT Blood Glucose.: 195 mg/dL (02-20-24 @ 06:56)  POCT Blood Glucose.: 203 mg/dL (02-20-24 @ 06:07)  POCT Blood Glucose.: 178 mg/dL (02-20-24 @ 05:05)  POCT Blood Glucose.: 153 mg/dL (02-20-24 @ 04:02)  POCT Blood Glucose.: 174 mg/dL (02-20-24 @ 03:07)  POCT Blood Glucose.: 216 mg/dL (02-20-24 @ 02:01)  POCT Blood Glucose.: 205 mg/dL (02-20-24 @ 01:04)  POCT Blood Glucose.: 89 mg/dL (02-20-24 @ 00:12)  POCT Blood Glucose.: 133 mg/dL (02-19-24 @ 23:13)  POCT Blood Glucose.: 156 mg/dL (02-19-24 @ 22:17)  POCT Blood Glucose.: 191 mg/dL (02-19-24 @ 20:33)  POCT Blood Glucose.: 180 mg/dL (02-19-24 @ 14:03)                            12.8   15.32 )-----------( 293      ( 20 Feb 2024 00:25 )             40.9       02-20    134<L>  |  106  |  26<H>  ----------------------------<  186<H>  4.4   |  15<L>  |  0.63    eGFR: 98    Ca    8.0<L>      02-20  Mg     2.40     02-20  Phos  2.6     02-20    TPro  5.7<L>  /  Alb  2.7<L>  /  TBili  0.5  /  DBili  x   /  AST  565<H>  /  ALT  375<H>  /  AlkPhos  88  02-20      Thyroid Function Tests:  02-19 @ 18:53 TSH 2.50 FreeT4 -- T3 -- Anti TPO -- Anti Thyroglobulin Ab -- TSI --  02-19 @ 16:45 TSH 2.66 FreeT4 -- T3 -- Anti TPO -- Anti Thyroglobulin Ab -- TSI --      A1C with Estimated Average Glucose Result: 6.9 % (02-20-24 @ 00:25)  A1C with Estimated Average Glucose Result: 6.6 % (10-31-23 @ 05:58)          Radiology:                HPI:  68 year old female with PMH Mitral regurgitation, mechanical aortic valve replacement, rheumatic heart disease, atrial fibrillation, HTN, on Digoxin, Warfarin, DM 2 on Jardiance presenting with flu like symptoms from urgent care. 2 weeks ago patient started having cough, congestion, and intermittent fevers which improved. On 02/12 worsening URI with white psutum production. On 02/17 noticed dysuria and polyuria. Fevers improve with Tylenol. Went to urgent care today and found to be in Afib with RVR in the 180s and sent to the ED. No CP, SOB, abd pain, vomiting, diarrhea, edema, lightheadedness or dizziness, and palpitations.     In the ED Afebrile, BP of 90/60, HR in the 160s. Saturating well on room air. Received Acetaminophen, 2L IV bolus, 3 neosticks, started on Phenylephrine drip, and Lopressor 5 mg. Given Azithromycin and Ceftriaxone as well. Blood cultures pending.     Surgical History: Mitral valve repair, aortic valve repair  Allergies: NKDA   FH: None  SH: No smoking, no drinking, no illicit drug use    (19 Feb 2024 19:50)      Consulted for: Mariel  This is a 69 yo F /w a PMh of Mechanical aortic valve, rheumatic heart disease, afib, HTN, and DM2 who presents with euglycemic DKA and afib with RVR as well as a UTI and sepsis.    Initial labs show glucose 203, CO2 15, AG 20, BHB 2.8  Patient placed on insulin drip currently at 1 to 2 units per hour  DKA now resolved with BHB 0.2    Patient has DM2 for 20 years  Managed by Dr. Adam Ch  Current home meds are:  Jardiance 10mg daily  Janumet 50-1000mg BID  A1c 6.9%  Checks FSG infrequently  Reports jardiance started to aid in glycemic control. Denies a history of CKD, microalbuminuria or CHF        PAST MEDICAL & SURGICAL HISTORY:  T2DM (type 2 diabetes mellitus)      Chronic atrial fibrillation      H/O mitral valve replacement with mechanical valve      H/O mechanical aortic valve replacement          FAMILY HISTORY:  No pertinent family history in first degree relatives        Social History: denies all toxic habits    Home Medications:  atorvastatin 20 mg oral tablet: 1 tab(s) orally once a day (31 Oct 2023 10:24)  enalapril 2.5 mg oral tablet: 1 tab(s) orally once a day (31 Oct 2023 10:23)  Janumet 50 mg-1000 mg oral tablet: 2 tab(s) orally once a day (31 Oct 2023 10:23)  Jardiance 10 mg oral tablet: 1 tab(s) orally once a day (31 Oct 2023 10:23)  metoprolol tartrate 25 mg oral tablet: 1 tab(s) orally 2 times a day (31 Oct 2023 10:23)  warfarin 2.5 mg oral tablet: 1 tab(s) orally 2 tabs on M/W/F; 1 tab on Tu/Th/Sa/Su (31 Oct 2023 10:23)      MEDICATIONS  (STANDING):  cefTRIAXone   IVPB 1000 milliGRAM(s) IV Intermittent Once  dextrose 5% + lactated ringers. 1000 milliLiter(s) (150 mL/Hr) IV Continuous <Continuous>  insulin regular Infusion 1 Unit(s)/Hr (1 mL/Hr) IV Continuous <Continuous>  metoprolol tartrate 25 milliGRAM(s) Oral every 12 hours  metoprolol tartrate Injectable 5 milliGRAM(s) IV Push once  phenylephrine    Infusion 1 MICROgram(s)/kG/Min (7.97 mL/Hr) IV Continuous <Continuous>    MEDICATIONS  (PRN):  guaiFENesin Oral Liquid (Sugar-Free) 200 milliGRAM(s) Oral every 6 hours PRN Cough      Allergies    No Known Allergies    Intolerances      Review of Systems:  Constitutional: No fever  Eyes: No blurry vision  Neuro: No tremors  HEENT: No pain  Cardiovascular: No chest pain, palpitations  Respiratory: No SOB, no cough  GI: No nausea, vomiting, abdominal pain  : No dysuria  Skin: no rash  Psych: no depression  Endocrine: no polyuria, polydipsia  Hem/lymph: no swelling  Osteoporosis: no fractures    PHYSICAL EXAM:  VITALS: T(C): 36.6 (02-20-24 @ 08:00)  T(F): 97.8 (02-20-24 @ 08:00), Max: 101.1 (02-19-24 @ 15:03)  HR: 123 (02-20-24 @ 10:00) (100 - 160)  BP: 126/82 (02-20-24 @ 10:00) (58/49 - 126/82)  RR:  (16 - 36)  SpO2:  (96% - 100%)  Wt(kg): --  GENERAL: NAD  EYES: No proptosis, no lid lag, anicteric  THYROID: Normal size, no palpable nodules  RESPIRATORY: Clear to auscultation bilaterally  CARDIOVASCULAR: Regular rate and rhythm  GI: Soft, nontender, non distended  EXT: b/l feet without wounds; 2+ pulses  PSYCH: Alert and oriented x 3, reactive mood    POCT Blood Glucose.: 190 mg/dL (02-20-24 @ 10:08)  POCT Blood Glucose.: 201 mg/dL (02-20-24 @ 09:16)  POCT Blood Glucose.: 188 mg/dL (02-20-24 @ 08:10)  POCT Blood Glucose.: 195 mg/dL (02-20-24 @ 06:56)  POCT Blood Glucose.: 203 mg/dL (02-20-24 @ 06:07)  POCT Blood Glucose.: 178 mg/dL (02-20-24 @ 05:05)  POCT Blood Glucose.: 153 mg/dL (02-20-24 @ 04:02)  POCT Blood Glucose.: 174 mg/dL (02-20-24 @ 03:07)  POCT Blood Glucose.: 216 mg/dL (02-20-24 @ 02:01)  POCT Blood Glucose.: 205 mg/dL (02-20-24 @ 01:04)  POCT Blood Glucose.: 89 mg/dL (02-20-24 @ 00:12)  POCT Blood Glucose.: 133 mg/dL (02-19-24 @ 23:13)  POCT Blood Glucose.: 156 mg/dL (02-19-24 @ 22:17)  POCT Blood Glucose.: 191 mg/dL (02-19-24 @ 20:33)  POCT Blood Glucose.: 180 mg/dL (02-19-24 @ 14:03)                            12.8   15.32 )-----------( 293      ( 20 Feb 2024 00:25 )             40.9       02-20    134<L>  |  106  |  26<H>  ----------------------------<  186<H>  4.4   |  15<L>  |  0.63    eGFR: 98    Ca    8.0<L>      02-20  Mg     2.40     02-20  Phos  2.6     02-20    TPro  5.7<L>  /  Alb  2.7<L>  /  TBili  0.5  /  DBili  x   /  AST  565<H>  /  ALT  375<H>  /  AlkPhos  88  02-20      Thyroid Function Tests:  02-19 @ 18:53 TSH 2.50 FreeT4 -- T3 -- Anti TPO -- Anti Thyroglobulin Ab -- TSI --  02-19 @ 16:45 TSH 2.66 FreeT4 -- T3 -- Anti TPO -- Anti Thyroglobulin Ab -- TSI --      A1C with Estimated Average Glucose Result: 6.9 % (02-20-24 @ 00:25)  A1C with Estimated Average Glucose Result: 6.6 % (10-31-23 @ 05:58)          Radiology:

## 2024-02-20 NOTE — PROGRESS NOTE ADULT - SUBJECTIVE AND OBJECTIVE BOX
Interval Events: Admitted to MICU for Euglycemic DKA and Septic shock requiring pressors.     HPI:  68 year old female with PMH Mitral regurgitation, mechanical aortic valve replacement, rheumatic heart disease, atrial fibrillation, HTN, on Digoxin, Warfarin, DM 2 on Jardiance presenting with flu like symptoms from urgent care. 2 weeks ago patient started having cough, congestion, and intermittent fevers which improved. On 02/12 worsening URI with white psutum production. On 02/17 noticed dysuria and polyuria. Fevers improve with Tylenol. Went to urgent care today and found to be in Afib with RVR in the 180s and sent to the ED. No CP, SOB, abd pain, vomiting, diarrhea, edema, lightheadedness or dizziness, and palpitations.     In the ED Afebrile, BP of 90/60, HR in the 160s. Saturating well on room air. Received Acetaminophen, 2L IV bolus, 3 neosticks, started on Phenylephrine drip, and Lopressor 5 mg. Given Azithromycin and Ceftriaxone as well. Blood cultures pending.     Surgical History: Mitral valve repair, aortic valve repair  Allergies: NKDA   FH: None  SH: No smoking, no drinking, no illicit drug use    (19 Feb 2024 19:50)      REVIEW OF SYSTEMS:  Constitutional: [ ] negative [ ] fevers [ ] chills [ ] weight loss [ ] weight gain  HEENT: [ ] negative [ ] dry eyes [ ] eye irritation [ ] postnasal drip [ ] nasal congestion  CV: [ ] negative  [ ] chest pain [ ] orthopnea [ ] palpitations [ ] murmur  Resp: [ ] negative [ ] cough [ ] shortness of breath [ ] dyspnea [ ] wheezing [ ] sputum [ ] hemoptysis  GI: [ ] negative [ ] nausea [ ] vomiting [ ] diarrhea [ ] constipation [ ] abd pain [ ] dysphagia   : [ ] negative [ ] dysuria [ ] nocturia [ ] hematuria [ ] increased urinary frequency  Musculoskeletal: [ ] negative [ ] back pain [ ] myalgias [ ] arthralgias [ ] fracture  Skin: [ ] negative [ ] rash [ ] itch  Neurological: [ ] negative [ ] headache [ ] dizziness [ ] syncope [ ] weakness [ ] numbness  Psychiatric: [ ] negative [ ] anxiety [ ] depression  Endocrine: [ ] negative [ ] diabetes [ ] thyroid problem  Hematologic/Lymphatic: [ ] negative [ ] anemia [ ] bleeding problem  Allergic/Immunologic: [ ] negative [ ] itchy eyes [ ] nasal discharge [ ] hives [ ] angioedema  [ ] All other systems negative  [ ] Unable to assess ROS because ________    OBJECTIVE:  ICU Vital Signs Last 24 Hrs  T(C): 38.1 (20 Feb 2024 02:44), Max: 38.4 (19 Feb 2024 15:03)  T(F): 100.6 (20 Feb 2024 02:44), Max: 101.1 (19 Feb 2024 15:03)  HR: 123 (20 Feb 2024 06:00) (100 - 160)  BP: 109/75 (20 Feb 2024 06:00) (58/49 - 123/89)  BP(mean): 88 (20 Feb 2024 06:00) (52 - 108)  ABP: --  ABP(mean): --  RR: 33 (20 Feb 2024 06:00) (16 - 33)  SpO2: 100% (20 Feb 2024 06:00) (96% - 100%)    O2 Parameters below as of 20 Feb 2024 01:00  Patient On (Oxygen Delivery Method): room air              02-19 @ 07:01  -  02-20 @ 06:39  --------------------------------------------------------  IN: 1914.5 mL / OUT: 750 mL / NET: 1164.5 mL      CAPILLARY BLOOD GLUCOSE      POCT Blood Glucose.: 203 mg/dL (20 Feb 2024 06:07)      Physical Exam:   CONSTITUTIONAL: Well groomed, no apparent distress  EYES: PERRLA and symmetric, EOMI, No conjunctival or scleral injection, non-icteric  ENMT: Oral mucosa with moist membranes. Normal dentition; no pharyngeal injection or exudates  NECK: Supple, symmetric and without tracheal deviation   RESP: No respiratory distress, no use of accessory muscles; CTA b/l, no WRR  CV: Irregularly irregular, +S1S2, no MRG; no JVD; no peripheral edema, (+) tachycardia   GI: Soft, NT, ND, no rebound, no guarding; no palpable masses; no hepatosplenomegaly; no hernia palpated  LYMPH: No cervical LAD or tenderness; no axillary LAD or tenderness; no inguinal LAD or tenderness  SKIN: No rashes or ulcers noted; no subcutaneous nodules or induration palpable  NEURO: CN II-XII intact; normal reflexes in upper and lower extremities, sensation intact in upper and lower extremities b/l to light touch   PSYCH: Appropriate insight/judgment; A+O x 3, mood and affect appropriate, recent/remote memory intact          HOSPITAL MEDICATIONS:  Standing Meds:  cefTRIAXone   IVPB 1000 milliGRAM(s) IV Intermittent Once  dextrose 5% + lactated ringers. 1000 milliLiter(s) IV Continuous <Continuous>  insulin regular Infusion 1 Unit(s)/Hr IV Continuous <Continuous>  metoprolol tartrate 25 milliGRAM(s) Oral every 12 hours  phenylephrine    Infusion 1 MICROgram(s)/kG/Min IV Continuous <Continuous>      PRN Meds:  guaiFENesin Oral Liquid (Sugar-Free) 200 milliGRAM(s) Oral every 6 hours PRN      LABS:                        12.8   15.32 )-----------( 293      ( 20 Feb 2024 00:25 )             40.9     Hgb Trend: 12.8<--, 11.3<--  02-20    135  |  105  |  23  ----------------------------<  562<HH>  3.7   |  17<L>  |  0.57    Ca    8.2<L>      20 Feb 2024 04:10  Phos  2.2     02-20  Mg     2.30     02-20    TPro  5.0<L>  /  Alb  2.5<L>  /  TBili  0.4  /  DBili  x   /  AST  516<H>  /  ALT  318<H>  /  AlkPhos  75  02-20    Creatinine Trend: 0.57<--, 0.60<--, 0.84<--, 0.84<--, 0.86<--  PT/INR - ( 20 Feb 2024 00:25 )   PT: 33.0 sec;   INR: 3.02 ratio         PTT - ( 20 Feb 2024 00:25 )  PTT:43.8 sec  Urinalysis Basic - ( 20 Feb 2024 04:10 )    Color: x / Appearance: x / SG: x / pH: x  Gluc: 562 mg/dL / Ketone: x  / Bili: x / Urobili: x   Blood: x / Protein: x / Nitrite: x   Leuk Esterase: x / RBC: x / WBC x   Sq Epi: x / Non Sq Epi: x / Bacteria: x        Venous Blood Gas:  02-20 @ 05:50  7.19/51/32/20/41.7  VBG Lactate: 3.3  Venous Blood Gas:  02-20 @ 04:10  7.21/43/31/17/42.8  VBG Lactate: 5.7  Venous Blood Gas:  02-20 @ 00:25  7.21/51/< 20/20/18.2  VBG Lactate: --  Venous Blood Gas:  02-19 @ 19:25  7.15/45/40/16/68.4  VBG Lactate: 3.9  Venous Blood Gas:  02-19 @ 16:46  7.16/41/50/15/74.5  VBG Lactate: 3.5  Venous Blood Gas:  02-19 @ 14:43  7.27/38/27/17/39.3  VBG Lactate: 4.0      MICROBIOLOGY:     RADIOLOGY:  [ ] Reviewed and interpreted by me           Interval Events: Admitted to MICU for Euglycemic DKA and Septic shock requiring pressors.     HPI:  68 year old female with PMH Mitral regurgitation, mechanical aortic valve replacement, rheumatic heart disease, atrial fibrillation, HTN, on Digoxin, Warfarin, DM 2 on Jardiance presenting with flu like symptoms from urgent care. 2 weeks ago patient started having cough, congestion, and intermittent fevers which improved. On 02/12 worsening URI with white psutum production. On 02/17 noticed dysuria and polyuria. Fevers improve with Tylenol. Went to urgent care today and found to be in Afib with RVR in the 180s and sent to the ED. No CP, SOB, abd pain, vomiting, diarrhea, edema, lightheadedness or dizziness, and palpitations.     In the ED Afebrile, BP of 90/60, HR in the 160s. Saturating well on room air. Received Acetaminophen, 2L IV bolus, 3 neosticks, started on Phenylephrine drip, and Lopressor 5 mg. Given Azithromycin and Ceftriaxone as well. Blood cultures pending.     Surgical History: Mitral valve repair, aortic valve repair  Allergies: NKDA   FH: None  SH: No smoking, no drinking, no illicit drug use    (19 Feb 2024 19:50)      REVIEW OF SYSTEMS:  Constitutional: [x] negative [ ] fevers [ ] chills [ ] weight loss [ ] weight gain  HEENT: [x] negative [ ] dry eyes [ ] eye irritation [ ] postnasal drip [ ] nasal congestion  CV: [x] negative  [ ] chest pain [ ] orthopnea [ ] palpitations [ ] murmur  Resp: [x] negative [ ] cough [ ] shortness of breath [ ] dyspnea [ ] wheezing [ ] sputum [ ] hemoptysis  GI: [x] negative [ ] nausea [ ] vomiting [ ] diarrhea [ ] constipation [ ] abd pain [ ] dysphagia   : [ ] negative [x] dysuria [ ] nocturia [ ] hematuria [ ] increased urinary frequency  Musculoskeletal: [x] negative [ ] back pain [ ] myalgias [ ] arthralgias [ ] fracture  Skin: [x] negative [ ] rash [ ] itch  Neurological: [x] negative [ ] headache [ ] dizziness [ ] syncope [ ] weakness [ ] numbness  Psychiatric: [x] negative [ ] anxiety [ ] depression  Endocrine: [x] negative [ ] diabetes [ ] thyroid problem  Hematologic/Lymphatic: [x] negative [ ] anemia [ ] bleeding problem  Allergic/Immunologic: [x] negative [ ] itchy eyes [ ] nasal discharge [ ] hives [ ] angioedema  [x] All other systems negative  [ ] Unable to assess ROS because ________    OBJECTIVE:  ICU Vital Signs Last 24 Hrs  T(C): 38.1 (20 Feb 2024 02:44), Max: 38.4 (19 Feb 2024 15:03)  T(F): 100.6 (20 Feb 2024 02:44), Max: 101.1 (19 Feb 2024 15:03)  HR: 123 (20 Feb 2024 06:00) (100 - 160)  BP: 109/75 (20 Feb 2024 06:00) (58/49 - 123/89)  BP(mean): 88 (20 Feb 2024 06:00) (52 - 108)  ABP: --  ABP(mean): --  RR: 33 (20 Feb 2024 06:00) (16 - 33)  SpO2: 100% (20 Feb 2024 06:00) (96% - 100%)    O2 Parameters below as of 20 Feb 2024 01:00  Patient On (Oxygen Delivery Method): room air              02-19 @ 07:01  -  02-20 @ 06:39  --------------------------------------------------------  IN: 1914.5 mL / OUT: 750 mL / NET: 1164.5 mL      CAPILLARY BLOOD GLUCOSE      POCT Blood Glucose.: 203 mg/dL (20 Feb 2024 06:07)      Physical Exam:   CONSTITUTIONAL: Well groomed, no apparent distress  EYES: PERRLA and symmetric, EOMI, No conjunctival or scleral injection, non-icteric  ENMT: Oral mucosa with moist membranes. Normal dentition; no pharyngeal injection or exudates  NECK: Supple, symmetric and without tracheal deviation   RESP: No respiratory distress, no use of accessory muscles; CTA b/l, no WRR  CV: Irregularly irregular, +S1S2, no MRG; no JVD; no peripheral edema, (+) tachycardia   GI: Soft, NT, ND, no rebound, no guarding; no palpable masses; no hepatosplenomegaly; no hernia palpated  LYMPH: No cervical LAD or tenderness; no axillary LAD or tenderness; no inguinal LAD or tenderness  SKIN: No rashes or ulcers noted; no subcutaneous nodules or induration palpable  NEURO: CN II-XII intact; normal reflexes in upper and lower extremities, sensation intact in upper and lower extremities b/l to light touch   PSYCH: Appropriate insight/judgment; A+O x 3, mood and affect appropriate, recent/remote memory intact          HOSPITAL MEDICATIONS:  Standing Meds:  cefTRIAXone   IVPB 1000 milliGRAM(s) IV Intermittent Once  dextrose 5% + lactated ringers. 1000 milliLiter(s) IV Continuous <Continuous>  insulin regular Infusion 1 Unit(s)/Hr IV Continuous <Continuous>  metoprolol tartrate 25 milliGRAM(s) Oral every 12 hours  phenylephrine    Infusion 1 MICROgram(s)/kG/Min IV Continuous <Continuous>      PRN Meds:  guaiFENesin Oral Liquid (Sugar-Free) 200 milliGRAM(s) Oral every 6 hours PRN      LABS:                        12.8   15.32 )-----------( 293      ( 20 Feb 2024 00:25 )             40.9     Hgb Trend: 12.8<--, 11.3<--  02-20    135  |  105  |  23  ----------------------------<  562<HH>  3.7   |  17<L>  |  0.57    Ca    8.2<L>      20 Feb 2024 04:10  Phos  2.2     02-20  Mg     2.30     02-20    TPro  5.0<L>  /  Alb  2.5<L>  /  TBili  0.4  /  DBili  x   /  AST  516<H>  /  ALT  318<H>  /  AlkPhos  75  02-20    Creatinine Trend: 0.57<--, 0.60<--, 0.84<--, 0.84<--, 0.86<--  PT/INR - ( 20 Feb 2024 00:25 )   PT: 33.0 sec;   INR: 3.02 ratio         PTT - ( 20 Feb 2024 00:25 )  PTT:43.8 sec  Urinalysis Basic - ( 20 Feb 2024 04:10 )    Color: x / Appearance: x / SG: x / pH: x  Gluc: 562 mg/dL / Ketone: x  / Bili: x / Urobili: x   Blood: x / Protein: x / Nitrite: x   Leuk Esterase: x / RBC: x / WBC x   Sq Epi: x / Non Sq Epi: x / Bacteria: x        Venous Blood Gas:  02-20 @ 05:50  7.19/51/32/20/41.7  VBG Lactate: 3.3  Venous Blood Gas:  02-20 @ 04:10  7.21/43/31/17/42.8  VBG Lactate: 5.7  Venous Blood Gas:  02-20 @ 00:25  7.21/51/< 20/20/18.2  VBG Lactate: --  Venous Blood Gas:  02-19 @ 19:25  7.15/45/40/16/68.4  VBG Lactate: 3.9  Venous Blood Gas:  02-19 @ 16:46  7.16/41/50/15/74.5  VBG Lactate: 3.5  Venous Blood Gas:  02-19 @ 14:43  7.27/38/27/17/39.3  VBG Lactate: 4.0      MICROBIOLOGY:     RADIOLOGY:  [ ] Reviewed and interpreted by me

## 2024-02-20 NOTE — CONSULT NOTE ADULT - ASSESSMENT
This is a 69 yo F /w a PMh of Mechanical aortic valve, rheumatic heart diesase, afib, HTN, and DM2 who presents with euglycemic DKA and afib with RVR.    #DM2 - well controlled  #eDKA in setting of infection and afib /w RVR  -currently on insulin drip 1 unit per hour This is a 69 yo F /w a PMh of Mechanical aortic valve, rheumatic heart diesase, afib, HTN, and DM2 who presents with euglycemic DKA and afib with RVR. At this time DKA seems resolved given BHb 0.2 x2 and does not explain current acidemia seen on VBG.    #DM2 - well controlled  #eDKA in setting of infection and afib /w RVR  -currently on insulin drip 1 unit per hour and FSG dropped to 82  -can give lantus Xunits and continue insulin drip for 2 hours before stopping  -once off of insulin drip can start carb consistent diet and 3 units admelog with meals  -low admelog correction scales before meals and bedtime  -FSG before meals and bedtime once off of insulin drip  -FSG goal 140-180mg/dL while in MICU  -hypoglycemia protocol in place if needed    #discharge  -follow up with outpatient endocrinologist Dr. Ch  -stop jardiance on discharge  -c/w janumet 50-1000mg BID  -will consider adding pioglitazone 15mg daily on discharge    #HTN  -Bp goal <130/80  -not on ACEi/ARB  -was on pressors earlier today thus will hold any anti-hypertensive treatment  -can resume enalapril 2/5mg daily on discharge    #HLD  -c/w atorvastatin 20mg daily if no contraindications    Case discussed with Dr. Gail Adams MD  Endocrine Fellow  Can be reached via teams. For follow up questions, discharge recommendations, or new consults, please call answering service at [phone] (weekdays); [phone] (nights/weekends)     This is a 69 yo F /w a PMH of Mechanical aortic valve, rheumatic heart diesase, afib, HTN, and DM2 who presents with euglycemic DKA and afib with RVR. At this time DKA seems resolved given BHb 0.2 x2 and does not explain current acidemia seen on VBG.    #DM2 - well controlled  #eDKA in setting of infection and afib /w RVR  -currently on insulin drip 1 unit per hour and FSG dropped to 82  -can give lantus 6 units and continue insulin drip for 2 hours before stopping  -once off of insulin drip can start carb consistent diet   -low admelog correction scales before meals and bedtime  -FSG before meals and bedtime once off of insulin drip  -FSG goal 140-180mg/dL while in MICU  -hypoglycemia protocol in place if needed    #discharge  -follow up with outpatient endocrinologist Dr. Ch  -stop jardiance on discharge  -c/w janumet 50-1000mg BID      #HTN  -Bp goal <130/80  -not on ACEi/ARB  -was on pressors earlier today thus will hold any anti-hypertensive treatment  -can resume enalapril 2/5mg daily on discharge    #HLD  -c/w atorvastatin 20mg daily if no contraindications    Case discussed with Dr. Gail Adams MD  Endocrine Fellow  Can be reached via teams. For follow up questions, discharge recommendations, or new consults, please call answering service at [phone] (weekdays); [phone] (nights/weekends)     This is a 67 yo F /w a PMH of Mechanical aortic valve, rheumatic heart diesase, afib, HTN, and DM2 who presents with euglycemic DKA and afib with RVR. At this time DKA seems resolved given BHb 0.2 x2 and does not explain current acidemia seen on VBG.    #DM2 - well controlled  #eDKA in setting of infection and afib /w RVR  -currently on insulin drip 1 unit per hour and FSG dropped to 82  -can give lantus 6 units and continue insulin drip for 2 hours before stopping  -once off of insulin drip can start carb consistent diet   -low admelog correction scales before meals and bedtime  -FSG before meals and bedtime once off of insulin drip  -FSG goal 140-180mg/dL while in MICU  -hypoglycemia protocol in place if needed    #discharge  -follow up with outpatient endocrinologist Dr. Ch  -stop jardiance on discharge given urosepsis and EDKA.  -Will need to trend lactate closer to discharge to see if metformin can be safely resumed, if so then may resume janumet 50-1000mg BID      #HTN  -Bp goal <130/80  -not on ACEi/ARB  -was on pressors earlier today thus will hold any anti-hypertensive treatment  -can resume enalapril 2/5mg daily on discharge    #HLD  -c/w atorvastatin 20mg daily if no contraindications    Case discussed with Dr. Gail Adams MD  Endocrine Fellow  Can be reached via teams. For follow up questions, discharge recommendations, or new consults, please call answering service at [phone] (weekdays); [phone] (nights/weekends)

## 2024-02-21 DIAGNOSIS — E11.65 TYPE 2 DIABETES MELLITUS WITH HYPERGLYCEMIA: ICD-10-CM

## 2024-02-21 LAB
ALBUMIN SERPL ELPH-MCNC: 2.8 G/DL — LOW (ref 3.3–5)
ALBUMIN SERPL ELPH-MCNC: 3.2 G/DL — LOW (ref 3.3–5)
ALP SERPL-CCNC: 114 U/L — SIGNIFICANT CHANGE UP (ref 40–120)
ALP SERPL-CCNC: 187 U/L — HIGH (ref 40–120)
ALT FLD-CCNC: 403 U/L — HIGH (ref 4–33)
ALT FLD-CCNC: 507 U/L — HIGH (ref 4–33)
ANION GAP SERPL CALC-SCNC: 12 MMOL/L — SIGNIFICANT CHANGE UP (ref 7–14)
ANION GAP SERPL CALC-SCNC: 8 MMOL/L — SIGNIFICANT CHANGE UP (ref 7–14)
APTT BLD: 34.1 SEC — SIGNIFICANT CHANGE UP (ref 24.5–35.6)
APTT BLD: 45.9 SEC — HIGH (ref 24.5–35.6)
AST SERPL-CCNC: 262 U/L — HIGH (ref 4–32)
AST SERPL-CCNC: 281 U/L — HIGH (ref 4–32)
BASE EXCESS BLDV CALC-SCNC: -0.6 MMOL/L — SIGNIFICANT CHANGE UP (ref -2–3)
BASE EXCESS BLDV CALC-SCNC: -4 MMOL/L — LOW (ref -2–3)
BILIRUB SERPL-MCNC: 0.6 MG/DL — SIGNIFICANT CHANGE UP (ref 0.2–1.2)
BILIRUB SERPL-MCNC: 0.6 MG/DL — SIGNIFICANT CHANGE UP (ref 0.2–1.2)
BLOOD GAS VENOUS COMPREHENSIVE RESULT: SIGNIFICANT CHANGE UP
BLOOD GAS VENOUS COMPREHENSIVE RESULT: SIGNIFICANT CHANGE UP
BUN SERPL-MCNC: 23 MG/DL — SIGNIFICANT CHANGE UP (ref 7–23)
BUN SERPL-MCNC: 26 MG/DL — HIGH (ref 7–23)
C DIFF BY PCR RESULT: SIGNIFICANT CHANGE UP
CALCIUM SERPL-MCNC: 8.1 MG/DL — LOW (ref 8.4–10.5)
CALCIUM SERPL-MCNC: 8.4 MG/DL — SIGNIFICANT CHANGE UP (ref 8.4–10.5)
CHLORIDE BLDV-SCNC: 98 MMOL/L — SIGNIFICANT CHANGE UP (ref 96–108)
CHLORIDE BLDV-SCNC: 98 MMOL/L — SIGNIFICANT CHANGE UP (ref 96–108)
CHLORIDE SERPL-SCNC: 100 MMOL/L — SIGNIFICANT CHANGE UP (ref 98–107)
CHLORIDE SERPL-SCNC: 101 MMOL/L — SIGNIFICANT CHANGE UP (ref 98–107)
CO2 BLDV-SCNC: 24 MMOL/L — SIGNIFICANT CHANGE UP (ref 22–26)
CO2 BLDV-SCNC: 26.8 MMOL/L — HIGH (ref 22–26)
CO2 SERPL-SCNC: 19 MMOL/L — LOW (ref 22–31)
CO2 SERPL-SCNC: 25 MMOL/L — SIGNIFICANT CHANGE UP (ref 22–31)
CREAT SERPL-MCNC: 0.59 MG/DL — SIGNIFICANT CHANGE UP (ref 0.5–1.3)
CREAT SERPL-MCNC: 0.64 MG/DL — SIGNIFICANT CHANGE UP (ref 0.5–1.3)
CULTURE RESULTS: SIGNIFICANT CHANGE UP
EGFR: 97 ML/MIN/1.73M2 — SIGNIFICANT CHANGE UP
EGFR: 99 ML/MIN/1.73M2 — SIGNIFICANT CHANGE UP
GAS PNL BLDV: 129 MMOL/L — LOW (ref 136–145)
GAS PNL BLDV: 129 MMOL/L — LOW (ref 136–145)
GAS PNL BLDV: SIGNIFICANT CHANGE UP
GLUCOSE BLDC GLUCOMTR-MCNC: 215 MG/DL — HIGH (ref 70–99)
GLUCOSE BLDC GLUCOMTR-MCNC: 270 MG/DL — HIGH (ref 70–99)
GLUCOSE BLDC GLUCOMTR-MCNC: 305 MG/DL — HIGH (ref 70–99)
GLUCOSE BLDC GLUCOMTR-MCNC: 315 MG/DL — HIGH (ref 70–99)
GLUCOSE BLDV-MCNC: 387 MG/DL — HIGH (ref 70–99)
GLUCOSE BLDV-MCNC: 397 MG/DL — HIGH (ref 70–99)
GLUCOSE SERPL-MCNC: 359 MG/DL — HIGH (ref 70–99)
GLUCOSE SERPL-MCNC: 368 MG/DL — HIGH (ref 70–99)
HCO3 BLDV-SCNC: 23 MMOL/L — SIGNIFICANT CHANGE UP (ref 22–29)
HCO3 BLDV-SCNC: 25 MMOL/L — SIGNIFICANT CHANGE UP (ref 22–29)
HCT VFR BLD CALC: 36.4 % — SIGNIFICANT CHANGE UP (ref 34.5–45)
HCT VFR BLD CALC: 40 % — SIGNIFICANT CHANGE UP (ref 34.5–45)
HCT VFR BLDA CALC: 33 % — LOW (ref 34.5–46.5)
HCT VFR BLDA CALC: 38 % — SIGNIFICANT CHANGE UP (ref 34.5–46.5)
HGB BLD CALC-MCNC: 10.9 G/DL — LOW (ref 11.7–16.1)
HGB BLD CALC-MCNC: 12.6 G/DL — SIGNIFICANT CHANGE UP (ref 11.7–16.1)
HGB BLD-MCNC: 11.8 G/DL — SIGNIFICANT CHANGE UP (ref 11.5–15.5)
HGB BLD-MCNC: 12.8 G/DL — SIGNIFICANT CHANGE UP (ref 11.5–15.5)
INR BLD: 5 RATIO — CRITICAL HIGH (ref 0.85–1.18)
INR BLD: 5.4 RATIO — CRITICAL HIGH (ref 0.85–1.18)
LACTATE BLDV-MCNC: 2.2 MMOL/L — HIGH (ref 0.5–2)
LACTATE BLDV-MCNC: 2.4 MMOL/L — HIGH (ref 0.5–2)
LACTATE SERPL-SCNC: 2.1 MMOL/L — HIGH (ref 0.5–2)
LEGIONELLA AG UR QL: NEGATIVE — SIGNIFICANT CHANGE UP
MAGNESIUM SERPL-MCNC: 2.2 MG/DL — SIGNIFICANT CHANGE UP (ref 1.6–2.6)
MCHC RBC-ENTMCNC: 29.2 PG — SIGNIFICANT CHANGE UP (ref 27–34)
MCHC RBC-ENTMCNC: 29.2 PG — SIGNIFICANT CHANGE UP (ref 27–34)
MCHC RBC-ENTMCNC: 32 GM/DL — SIGNIFICANT CHANGE UP (ref 32–36)
MCHC RBC-ENTMCNC: 32.4 GM/DL — SIGNIFICANT CHANGE UP (ref 32–36)
MCV RBC AUTO: 90.1 FL — SIGNIFICANT CHANGE UP (ref 80–100)
MCV RBC AUTO: 91.1 FL — SIGNIFICANT CHANGE UP (ref 80–100)
NRBC # BLD: 0 /100 WBCS — SIGNIFICANT CHANGE UP (ref 0–0)
NRBC # BLD: 0 /100 WBCS — SIGNIFICANT CHANGE UP (ref 0–0)
NRBC # FLD: 0 K/UL — SIGNIFICANT CHANGE UP (ref 0–0)
NRBC # FLD: 0 K/UL — SIGNIFICANT CHANGE UP (ref 0–0)
PCO2 BLDV: 46 MMHG — SIGNIFICANT CHANGE UP (ref 39–52)
PCO2 BLDV: 47 MMHG — SIGNIFICANT CHANGE UP (ref 39–52)
PH BLDV: 7.29 — LOW (ref 7.32–7.43)
PH BLDV: 7.35 — SIGNIFICANT CHANGE UP (ref 7.32–7.43)
PHOSPHATE SERPL-MCNC: 2.7 MG/DL — SIGNIFICANT CHANGE UP (ref 2.5–4.5)
PLATELET # BLD AUTO: 217 K/UL — SIGNIFICANT CHANGE UP (ref 150–400)
PLATELET # BLD AUTO: 230 K/UL — SIGNIFICANT CHANGE UP (ref 150–400)
PO2 BLDV: 30 MMHG — SIGNIFICANT CHANGE UP (ref 25–45)
PO2 BLDV: 41 MMHG — SIGNIFICANT CHANGE UP (ref 25–45)
POTASSIUM BLDV-SCNC: 4.3 MMOL/L — SIGNIFICANT CHANGE UP (ref 3.5–5.1)
POTASSIUM BLDV-SCNC: 5.1 MMOL/L — SIGNIFICANT CHANGE UP (ref 3.5–5.1)
POTASSIUM SERPL-MCNC: 4.9 MMOL/L — SIGNIFICANT CHANGE UP (ref 3.5–5.3)
POTASSIUM SERPL-MCNC: 5 MMOL/L — SIGNIFICANT CHANGE UP (ref 3.5–5.3)
POTASSIUM SERPL-SCNC: 4.9 MMOL/L — SIGNIFICANT CHANGE UP (ref 3.5–5.3)
POTASSIUM SERPL-SCNC: 5 MMOL/L — SIGNIFICANT CHANGE UP (ref 3.5–5.3)
PROT SERPL-MCNC: 5.4 G/DL — LOW (ref 6–8.3)
PROT SERPL-MCNC: 6.2 G/DL — SIGNIFICANT CHANGE UP (ref 6–8.3)
PROTHROM AB SERPL-ACNC: 53.4 SEC — HIGH (ref 9.5–13)
PROTHROM AB SERPL-ACNC: 58.1 SEC — HIGH (ref 9.5–13)
RBC # BLD: 4.04 M/UL — SIGNIFICANT CHANGE UP (ref 3.8–5.2)
RBC # BLD: 4.39 M/UL — SIGNIFICANT CHANGE UP (ref 3.8–5.2)
RBC # FLD: 14.6 % — HIGH (ref 10.3–14.5)
RBC # FLD: 14.6 % — HIGH (ref 10.3–14.5)
SAO2 % BLDV: 44.5 % — LOW (ref 67–88)
SAO2 % BLDV: 66.3 % — LOW (ref 67–88)
SODIUM SERPL-SCNC: 132 MMOL/L — LOW (ref 135–145)
SODIUM SERPL-SCNC: 133 MMOL/L — LOW (ref 135–145)
SPECIMEN SOURCE: SIGNIFICANT CHANGE UP
WBC # BLD: 15 K/UL — HIGH (ref 3.8–10.5)
WBC # BLD: 18.18 K/UL — HIGH (ref 3.8–10.5)
WBC # FLD AUTO: 15 K/UL — HIGH (ref 3.8–10.5)
WBC # FLD AUTO: 18.18 K/UL — HIGH (ref 3.8–10.5)

## 2024-02-21 PROCEDURE — 99232 SBSQ HOSP IP/OBS MODERATE 35: CPT

## 2024-02-21 PROCEDURE — 99291 CRITICAL CARE FIRST HOUR: CPT

## 2024-02-21 RX ORDER — METOPROLOL TARTRATE 50 MG
50 TABLET ORAL EVERY 8 HOURS
Refills: 0 | Status: DISCONTINUED | OUTPATIENT
Start: 2024-02-21 | End: 2024-02-22

## 2024-02-21 RX ORDER — METOPROLOL TARTRATE 50 MG
25 TABLET ORAL EVERY 8 HOURS
Refills: 0 | Status: DISCONTINUED | OUTPATIENT
Start: 2024-02-21 | End: 2024-02-21

## 2024-02-21 RX ORDER — SODIUM CHLORIDE 0.65 %
1 AEROSOL, SPRAY (ML) NASAL
Refills: 0 | Status: DISCONTINUED | OUTPATIENT
Start: 2024-02-21 | End: 2024-02-28

## 2024-02-21 RX ORDER — INSULIN LISPRO 100/ML
3 VIAL (ML) SUBCUTANEOUS
Refills: 0 | Status: DISCONTINUED | OUTPATIENT
Start: 2024-02-21 | End: 2024-02-26

## 2024-02-21 RX ORDER — AMIODARONE HYDROCHLORIDE 400 MG/1
TABLET ORAL
Refills: 0 | Status: DISCONTINUED | OUTPATIENT
Start: 2024-02-21 | End: 2024-02-22

## 2024-02-21 RX ORDER — AMIODARONE HYDROCHLORIDE 400 MG/1
400 TABLET ORAL EVERY 8 HOURS
Refills: 0 | Status: DISCONTINUED | OUTPATIENT
Start: 2024-02-21 | End: 2024-02-22

## 2024-02-21 RX ORDER — INSULIN LISPRO 100/ML
2 VIAL (ML) SUBCUTANEOUS ONCE
Refills: 0 | Status: COMPLETED | OUTPATIENT
Start: 2024-02-21 | End: 2024-02-21

## 2024-02-21 RX ORDER — INSULIN LISPRO 100/ML
3 VIAL (ML) SUBCUTANEOUS
Refills: 0 | Status: DISCONTINUED | OUTPATIENT
Start: 2024-02-22 | End: 2024-02-26

## 2024-02-21 RX ORDER — INSULIN GLARGINE 100 [IU]/ML
10 INJECTION, SOLUTION SUBCUTANEOUS
Refills: 0 | Status: DISCONTINUED | OUTPATIENT
Start: 2024-02-21 | End: 2024-02-22

## 2024-02-21 RX ORDER — METOPROLOL TARTRATE 50 MG
25 TABLET ORAL EVERY 12 HOURS
Refills: 0 | Status: DISCONTINUED | OUTPATIENT
Start: 2024-02-21 | End: 2024-02-21

## 2024-02-21 RX ADMIN — Medication 2 UNIT(S): at 02:16

## 2024-02-21 RX ADMIN — Medication 50 MILLIGRAM(S): at 21:42

## 2024-02-21 RX ADMIN — Medication 4: at 12:11

## 2024-02-21 RX ADMIN — AMIODARONE HYDROCHLORIDE 16.7 MG/MIN: 400 TABLET ORAL at 19:18

## 2024-02-21 RX ADMIN — Medication 125 MICROGRAM(S): at 12:11

## 2024-02-21 RX ADMIN — PIPERACILLIN AND TAZOBACTAM 25 GRAM(S): 4; .5 INJECTION, POWDER, LYOPHILIZED, FOR SOLUTION INTRAVENOUS at 17:10

## 2024-02-21 RX ADMIN — Medication 3 UNIT(S): at 17:08

## 2024-02-21 RX ADMIN — Medication 200 MILLIGRAM(S): at 23:30

## 2024-02-21 RX ADMIN — Medication 200 MILLIGRAM(S): at 15:15

## 2024-02-21 RX ADMIN — PIPERACILLIN AND TAZOBACTAM 25 GRAM(S): 4; .5 INJECTION, POWDER, LYOPHILIZED, FOR SOLUTION INTRAVENOUS at 10:36

## 2024-02-21 RX ADMIN — Medication 200 MILLIGRAM(S): at 03:32

## 2024-02-21 RX ADMIN — AMIODARONE HYDROCHLORIDE 400 MILLIGRAM(S): 400 TABLET ORAL at 21:42

## 2024-02-21 RX ADMIN — PIPERACILLIN AND TAZOBACTAM 25 GRAM(S): 4; .5 INJECTION, POWDER, LYOPHILIZED, FOR SOLUTION INTRAVENOUS at 00:09

## 2024-02-21 RX ADMIN — PIPERACILLIN AND TAZOBACTAM 25 GRAM(S): 4; .5 INJECTION, POWDER, LYOPHILIZED, FOR SOLUTION INTRAVENOUS at 23:31

## 2024-02-21 RX ADMIN — Medication 25 MILLIGRAM(S): at 10:36

## 2024-02-21 RX ADMIN — INSULIN GLARGINE 10 UNIT(S): 100 INJECTION, SOLUTION SUBCUTANEOUS at 18:25

## 2024-02-21 RX ADMIN — AMIODARONE HYDROCHLORIDE 16.7 MG/MIN: 400 TABLET ORAL at 00:32

## 2024-02-21 RX ADMIN — Medication 4: at 17:07

## 2024-02-21 NOTE — PROGRESS NOTE ADULT - SUBJECTIVE AND OBJECTIVE BOX
Diabetes  Follow up note    Interval History/ROS: Pt eating meals, no nausea, vomiting    MEDICATIONS  (STANDING):  aMIOdarone    Tablet   Oral   aMIOdarone    Tablet 400 milliGRAM(s) Oral every 8 hours  aMIOdarone Infusion 1 mG/Min (33.3 mL/Hr) IV Continuous <Continuous>  aMIOdarone Infusion 0.5 mG/Min (16.7 mL/Hr) IV Continuous <Continuous>  dextrose 5%. 1000 milliLiter(s) (100 mL/Hr) IV Continuous <Continuous>  dextrose 5%. 1000 milliLiter(s) (50 mL/Hr) IV Continuous <Continuous>  dextrose 50% Injectable 25 Gram(s) IV Push once  dextrose 50% Injectable 12.5 Gram(s) IV Push once  dextrose 50% Injectable 25 Gram(s) IV Push once  digoxin     Tablet 125 MICROGram(s) Oral every other day  glucagon  Injectable 1 milliGRAM(s) IntraMuscular once  insulin glargine Injectable (LANTUS) 6 Unit(s) SubCutaneous <User Schedule>  insulin lispro (ADMELOG) corrective regimen sliding scale   SubCutaneous three times a day before meals  insulin lispro (ADMELOG) corrective regimen sliding scale   SubCutaneous at bedtime  metoprolol tartrate 50 milliGRAM(s) Oral every 8 hours  piperacillin/tazobactam IVPB.. 3.375 Gram(s) IV Intermittent every 8 hours    MEDICATIONS  (PRN):  dextrose Oral Gel 15 Gram(s) Oral once PRN Blood Glucose LESS THAN 70 milliGRAM(s)/deciliter  guaiFENesin Oral Liquid (Sugar-Free) 200 milliGRAM(s) Oral every 6 hours PRN Cough  sodium chloride 0.65% Nasal 1 Spray(s) Both Nostrils two times a day PRN Nasal Congestion      Allergies    No Known Allergies    Intolerances          PHYSICAL EXAM:  VITALS: T(C): 37.4 (02-21-24 @ 16:00)  T(F): 99.4 (02-21-24 @ 16:00), Max: 99.4 (02-21-24 @ 16:00)  HR: 150 (02-21-24 @ 17:00) (107 - 152)  BP: 140/89 (02-21-24 @ 17:00) (92/71 - 160/102)  RR:  (17 - 36)  SpO2:  (98% - 100%)  Wt(kg): --  GENERAL: NAD,   EYES: No proptosis,  HEENT:  Atraumatic, Normocephalic,   RESPIRATORY: Clear to auscultation bilaterally  CARDIOVASCULAR: Regular rhythm; No murmurs; no peripheral edema  GI: Soft, nontender, non distended, normal bowel sounds  SKIN: Dry, intact, No rashes or lesions  MUSCULOSKELETAL: normal strength      POCT Blood Glucose.: 305 mg/dL (02-21-24 @ 16:59)  POCT Blood Glucose.: 315 mg/dL (02-21-24 @ 11:38)  POCT Blood Glucose.: 270 mg/dL (02-21-24 @ 02:02)  POCT Blood Glucose.: 301 mg/dL (02-20-24 @ 22:10)  POCT Blood Glucose.: 197 mg/dL (02-20-24 @ 18:57)  POCT Blood Glucose.: 184 mg/dL (02-20-24 @ 17:30)  POCT Blood Glucose.: 134 mg/dL (02-20-24 @ 16:03)  POCT Blood Glucose.: 82 mg/dL (02-20-24 @ 15:00)  POCT Blood Glucose.: 134 mg/dL (02-20-24 @ 14:01)  POCT Blood Glucose.: 162 mg/dL (02-20-24 @ 12:26)  POCT Blood Glucose.: 190 mg/dL (02-20-24 @ 10:08)  POCT Blood Glucose.: 201 mg/dL (02-20-24 @ 09:16)  POCT Blood Glucose.: 188 mg/dL (02-20-24 @ 08:10)  POCT Blood Glucose.: 195 mg/dL (02-20-24 @ 06:56)  POCT Blood Glucose.: 203 mg/dL (02-20-24 @ 06:07)  POCT Blood Glucose.: 178 mg/dL (02-20-24 @ 05:05)  POCT Blood Glucose.: 153 mg/dL (02-20-24 @ 04:02)  POCT Blood Glucose.: 174 mg/dL (02-20-24 @ 03:07)  POCT Blood Glucose.: 216 mg/dL (02-20-24 @ 02:01)  POCT Blood Glucose.: 205 mg/dL (02-20-24 @ 01:04)  POCT Blood Glucose.: 89 mg/dL (02-20-24 @ 00:12)  POCT Blood Glucose.: 133 mg/dL (02-19-24 @ 23:13)  POCT Blood Glucose.: 156 mg/dL (02-19-24 @ 22:17)  POCT Blood Glucose.: 191 mg/dL (02-19-24 @ 20:33)  POCT Blood Glucose.: 180 mg/dL (02-19-24 @ 14:03)        02-21    133<L>  |  100  |  26<H>  ----------------------------<  359<H>  4.9   |  25  |  0.64    eGFR: 97    Ca    8.4      02-21  Mg     2.20     02-21  Phos  2.7     02-21    TPro  5.4<L>  /  Alb  3.2<L>  /  TBili  0.6  /  DBili  x   /  AST  281<H>  /  ALT  507<H>  /  AlkPhos  187<H>  02-21        A1C with Estimated Average Glucose Result: 6.9 % (02-20-24 @ 00:25)  A1C with Estimated Average Glucose Result: 6.6 % (10-31-23 @ 05:58)

## 2024-02-21 NOTE — DIETITIAN INITIAL EVALUATION ADULT - PERTINENT LABORATORY DATA
02-21    132<L>  |  101  |  23  ----------------------------<  368<H>  5.0   |  19<L>  |  0.59    Ca    8.1<L>      21 Feb 2024 00:40  Phos  2.7     02-21  Mg     2.20     02-21    TPro  6.2  /  Alb  2.8<L>  /  TBili  0.6  /  DBili  x   /  AST  262<H>  /  ALT  403<H>  /  AlkPhos  114  02-21  POCT Blood Glucose.: 315 mg/dL (02-21-24 @ 11:38)  A1C with Estimated Average Glucose Result: 6.9 % (02-20-24 @ 00:25)  A1C with Estimated Average Glucose Result: 6.6 % (10-31-23 @ 05:58)

## 2024-02-21 NOTE — PROGRESS NOTE ADULT - ASSESSMENT
This is a 67 yo F /w a PMH of Mechanical aortic valve, rheumatic heart diesase, afib, HTN, and DM2 admitted with euglycemic DKA and afib with RVR.     1) T2DM with hyperglycemia  #eDKA in setting of infection and afib /w RVR - now resolved  A1c 6.9%  Home regimen - jardiance and janumet  -FSG goal 140-180mg/dL while in MICU   - PT with continued fasting and postprandial hyperglycemia  Start prandial insulin with admelog 3 units before meals  Increase lantus to 10 units daily  - continue low admelog correction scales before meals and bedtime  Discharge plan:  -follow up with outpatient endocrinologist Dr. Ch  -stop jardiance on discharge given urosepsis and EDKA. can consider resuming in future as outpt  -Will need to trend lactate closer to discharge to see if metformin can be safely resumed ( had decreased to 1.4, then increased to 2.1), if so then may resume janumet 50-1000mg BID, may need additional agent as well depending on blood glucose values      2)HTN  -enalapril on hold due to hypotension, continue to monitor    3) HLD  -continue atorvastatin 20mg daily if no contraindications    Discussed with MICU team    Annabelle Sprigner MD  On 2/21/24: via Teams or pager    Other times:  Diabetes team: [phone]   or email [email or web address]

## 2024-02-21 NOTE — PROGRESS NOTE ADULT - SUBJECTIVE AND OBJECTIVE BOX
Subjective    Patient seen and examined at bedside. Reports feeling winded with slight movements    Telemetry review: Rapid atrial fibrillation    Current Meds:  aMIOdarone Infusion 1 mG/Min IV Continuous <Continuous>  aMIOdarone Infusion 0.5 mG/Min IV Continuous <Continuous>  dextrose 5%. 1000 milliLiter(s) IV Continuous <Continuous>  dextrose 5%. 1000 milliLiter(s) IV Continuous <Continuous>  dextrose 50% Injectable 25 Gram(s) IV Push once  dextrose 50% Injectable 12.5 Gram(s) IV Push once  dextrose 50% Injectable 25 Gram(s) IV Push once  dextrose Oral Gel 15 Gram(s) Oral once PRN  digoxin     Tablet 125 MICROGram(s) Oral every other day  glucagon  Injectable 1 milliGRAM(s) IntraMuscular once  guaiFENesin Oral Liquid (Sugar-Free) 200 milliGRAM(s) Oral every 6 hours PRN  insulin glargine Injectable (LANTUS) 6 Unit(s) SubCutaneous <User Schedule>  insulin lispro (ADMELOG) corrective regimen sliding scale   SubCutaneous three times a day before meals  insulin lispro (ADMELOG) corrective regimen sliding scale   SubCutaneous at bedtime  metoprolol tartrate 25 milliGRAM(s) Oral every 12 hours  piperacillin/tazobactam IVPB.. 3.375 Gram(s) IV Intermittent every 8 hours  sodium chloride 0.65% Nasal 1 Spray(s) Both Nostrils two times a day PRN    Vitals:  T(F): 98.3 (02-21), Max: 98.6 (02-20)  HR: 127 (02-21) (107 - 152)  BP: 116/81 (02-21) (92/71 - 160/102)  RR: 22 (02-21)  SpO2: 100% (02-21)  I&O's Summary    20 Feb 2024 07:01  -  21 Feb 2024 07:00  --------------------------------------------------------  IN: 1792.1 mL / OUT: 1750 mL / NET: 42.1 mL    21 Feb 2024 07:01  -  21 Feb 2024 12:47  --------------------------------------------------------  IN: 83.5 mL / OUT: 100 mL / NET: -16.5 mL    Physical Exam:  General: No acute distress; well appearing  Cardiovascular: Tachycardic, irregularly irregular, with loud metallic click consistent with mechanical heart valve  Respiratory: Clear to auscultation bilaterally, speaking full sentences  Gastrointestinal: soft, non-tender, non-distended with normal bowel sounds  Extremities: 2+ pulses, no clubbing; no joint deformity, or edema  Neurologic: AAOx3,  Non-focal                        12.8   18.18 )-----------( 217      ( 21 Feb 2024 00:40 )             40.0     02-21    132<L>  |  101  |  23  ----------------------------<  368<H>  5.0   |  19<L>  |  0.59    Ca    8.1<L>      21 Feb 2024 00:40  Phos  2.7     02-21  Mg     2.20     02-21    TPro  6.2  /  Alb  2.8<L>  /  TBili  0.6  /  DBili  x   /  AST  262<H>  /  ALT  403<H>  /  AlkPhos  114  02-21    PT/INR - ( 21 Feb 2024 00:40 )   PT: 53.4 sec;   INR: 5.00 ratio         PTT - ( 21 Feb 2024 00:40 )  PTT:34.1 sec  CARDIAC MARKERS ( 20 Feb 2024 11:44 )  62 ng/L / x     / x     / x     / x     / x      CARDIAC MARKERS ( 19 Feb 2024 16:45 )  98 ng/L / x     / x     / x     / x     / 2.7 ng/mL  CARDIAC MARKERS ( 19 Feb 2024 14:43 )  103 ng/L / x     / x     / x     / x     / x      _______________________________________________________________________________________     CONCLUSIONS:      1. Techincally difficult study to assess left ventricular function given patients tachyarrhythmia. The LV function appears moderately to severely depressed.   2. The right ventricle is not well visualized.   3. Mechanical valve present in the mitral position.   4. A mechanical valve replacement is present in the aortic position. Mild intravalvular regurgitation.   5. Mild to moderate tricuspid regurgitation.   6. Technically difficult image quality.    ________________________________________________________________________________________  FINDINGS:     Left Ventricle:  The left ventricular cavity is small. Left ventricular systolic function is severely decreased with a calculated ejection fraction of 37 % by the Bunch's biplane method of disks. There is global left ventricular hypokinesis. Techincally difficult study to assess left ventricular function givenpatients tachyarrhythmia. The LV function appears moderately to severely depressed.     Right Ventricle:  The right ventricle is not well visualized. The right ventricular cavity is mildly enlarged in size, with normal wall thickness and probably normal systolic function.     Right Atrium:  The right atrium is normal in size with an indexed volume of 24.92 ml/m².     Interatrial Septum:  The interatrial septum appears intact.     Aortic Valve:  A mechanical valve replacement is present in the aortic position. There is mild intravalvular regurgitation.     Mitral Valve:  Mechanical valve is present in the mitral position. Prosthetic mitral valve leaflets are not well visualized. Transvalvular mitral gradients are normal.     Tricuspid Valve:  There is mild to moderate tricuspid regurgitation.     Pulmonic Valve:  Structurally normal pulmonic valve with normal leaflet excursion. There is mild pulmonic regurgitation.     Aorta:  The aortic root at the sinuses of Valsalva is normal in size. The aortic arch diameter is normal in size.

## 2024-02-21 NOTE — DIETITIAN INITIAL EVALUATION ADULT - NS FNS DIET ORDER
Diet, DASH/TLC:   Sodium & Cholesterol Restricted  Consistent Carbohydrate {Evening Snack} (CSTCHOSN) (02-20-24 @ 21:32)

## 2024-02-21 NOTE — DIETITIAN INITIAL EVALUATION ADULT - OTHER INFO
69 y/o female with hx HLD, HTN and DM admitted with DKA. Visited with pt as well as /son at bedside to obtain nutrition hx. Pt said she was very weak presently and did not have a lot of energy, therefore her oral intake was not as good as usual. She denies food allergies, nausea/vomiting/diarrhea/constipation, or issues with chewing/swallowing. Her food recall demonstrated very good knowledge of Consistent Carbohydrate therapeutic diet with pairing of high protein foods with carbohydrate or fruit servings. She said she consumes her fruit earlier in the day because it impacts her morning FS skewing it higher when she eats fruit later in the day. Her adult son buys her high protein nutrition drinks that are low in carbohydrate/sugar to assist with weight gain. Although pt said that her weight has been stable PTA, she exhibits moderate orbital subcutaneous fat store loss and moderate temporal muscle mass wasting which present her at moderate risk for malnutrition. Son inquired about oral supplements that facility could provide. Brought samples of Reduced Sugar Mighty Shake, Reduced Sugar Magic Cup, Ensure Max and Orgain as Son said that patient had tried Glucerna in the past and did not like it. Upon presentation of these supplements, Son read the labels and chose Ensure Max (150 kcal, 30 gm protein, 11 oz) x 2/day for patient as he liked the fact that it was very high in protein while very low in carbohydrate. Contacted MICU NP to order for patient. Food preferences taken and discussed with patient. She and her family were very appreciative of time given and intervention provided. FS over the past 24 hrs 134 - 315 mg/dl with Lantus and Admelog insulins ordered for coverage. Hb A1C 6.9% verifies very good glycemic control PTA. Encourage and monitor oral intake, especially of nutrition supplement. RDN services to remain available as needed.

## 2024-02-21 NOTE — PROGRESS NOTE ADULT - SUBJECTIVE AND OBJECTIVE BOX
Interval Events:    HPI:  68 year old female with PMH Mitral regurgitation, mechanical aortic valve replacement, rheumatic heart disease, atrial fibrillation, HTN, on Digoxin, Warfarin, DM 2 on Jardiance presenting with flu like symptoms from urgent care. 2 weeks ago patient started having cough, congestion, and intermittent fevers which improved. On 02/12 worsening URI with white psutum production. On 02/17 noticed dysuria and polyuria. Fevers improve with Tylenol. Went to urgent care today and found to be in Afib with RVR in the 180s and sent to the ED. No CP, SOB, abd pain, vomiting, diarrhea, edema, lightheadedness or dizziness, and palpitations.     In the ED Afebrile, BP of 90/60, HR in the 160s. Saturating well on room air. Received Acetaminophen, 2L IV bolus, 3 neosticks, started on Phenylephrine drip, and Lopressor 5 mg. Given Azithromycin and Ceftriaxone as well. Blood cultures pending.     Surgical History: Mitral valve repair, aortic valve repair  Allergies: NKDA   FH: None  SH: No smoking, no drinking, no illicit drug use    (19 Feb 2024 19:50)      REVIEW OF SYSTEMS:  Constitutional: [ ] negative [ ] fevers [ ] chills [ ] weight loss [ ] weight gain  HEENT: [ ] negative [ ] dry eyes [ ] eye irritation [ ] postnasal drip [ ] nasal congestion  CV: [ ] negative  [ ] chest pain [ ] orthopnea [ ] palpitations [ ] murmur  Resp: [ ] negative [ ] cough [ ] shortness of breath [ ] dyspnea [ ] wheezing [ ] sputum [ ] hemoptysis  GI: [ ] negative [ ] nausea [ ] vomiting [ ] diarrhea [ ] constipation [ ] abd pain [ ] dysphagia   : [ ] negative [ ] dysuria [ ] nocturia [ ] hematuria [ ] increased urinary frequency  Musculoskeletal: [ ] negative [ ] back pain [ ] myalgias [ ] arthralgias [ ] fracture  Skin: [ ] negative [ ] rash [ ] itch  Neurological: [ ] negative [ ] headache [ ] dizziness [ ] syncope [ ] weakness [ ] numbness  Psychiatric: [ ] negative [ ] anxiety [ ] depression  Endocrine: [ ] negative [ ] diabetes [ ] thyroid problem  Hematologic/Lymphatic: [ ] negative [ ] anemia [ ] bleeding problem  Allergic/Immunologic: [ ] negative [ ] itchy eyes [ ] nasal discharge [ ] hives [ ] angioedema  [ ] All other systems negative  [ ] Unable to assess ROS because ________    OBJECTIVE:  ICU Vital Signs Last 24 Hrs  T(C): 36.8 (21 Feb 2024 04:00), Max: 37 (20 Feb 2024 20:00)  T(F): 98.3 (21 Feb 2024 04:00), Max: 98.6 (20 Feb 2024 20:00)  HR: 123 (21 Feb 2024 05:00) (107 - 153)  BP: 149/85 (21 Feb 2024 05:00) (92/71 - 149/85)  BP(mean): 102 (21 Feb 2024 05:00) (77 - 110)  ABP: --  ABP(mean): --  RR: 27 (21 Feb 2024 05:00) (22 - 36)  SpO2: 100% (21 Feb 2024 05:00) (98% - 100%)    O2 Parameters below as of 21 Feb 2024 06:00  Patient On (Oxygen Delivery Method): room air              02-19 @ 07:01  -  02-20 @ 07:00  --------------------------------------------------------  IN: 1914.5 mL / OUT: 750 mL / NET: 1164.5 mL    02-20 @ 07:01  -  02-21 @ 06:13  --------------------------------------------------------  IN: 1758.7 mL / OUT: 1780 mL / NET: -21.3 mL      CAPILLARY BLOOD GLUCOSE      POCT Blood Glucose.: 270 mg/dL (21 Feb 2024 02:02)      Physical Exam:   CONSTITUTIONAL: Well groomed, no apparent distress  EYES: PERRLA and symmetric, EOMI, No conjunctival or scleral injection, non-icteric  ENMT: Oral mucosa with moist membranes. Normal dentition; no pharyngeal injection or exudates  NECK: Supple, symmetric and without tracheal deviation   RESP: No respiratory distress, no use of accessory muscles; CTA b/l, no WRR  CV: Irregularly irregular, +S1S2, no MRG; no JVD; no peripheral edema, (+) tachycardia   GI: Soft, NT, ND, no rebound, no guarding; no palpable masses; no hepatosplenomegaly; no hernia palpated  LYMPH: No cervical LAD or tenderness; no axillary LAD or tenderness; no inguinal LAD or tenderness  SKIN: No rashes or ulcers noted; no subcutaneous nodules or induration palpable  NEURO: CN II-XII intact; normal reflexes in upper and lower extremities, sensation intact in upper and lower extremities b/l to light touch   PSYCH: Appropriate insight/judgment; A+O x 3, mood and affect appropriate, recent/remote memory intact        HOSPITAL MEDICATIONS:  Standing Meds:  aMIOdarone Infusion 0.5 mG/Min IV Continuous <Continuous>  aMIOdarone Infusion 1 mG/Min IV Continuous <Continuous>  dextrose 5%. 1000 milliLiter(s) IV Continuous <Continuous>  dextrose 5%. 1000 milliLiter(s) IV Continuous <Continuous>  dextrose 50% Injectable 25 Gram(s) IV Push once  dextrose 50% Injectable 12.5 Gram(s) IV Push once  dextrose 50% Injectable 25 Gram(s) IV Push once  digoxin     Tablet 125 MICROGram(s) Oral every other day  glucagon  Injectable 1 milliGRAM(s) IntraMuscular once  insulin glargine Injectable (LANTUS) 6 Unit(s) SubCutaneous <User Schedule>  insulin lispro (ADMELOG) corrective regimen sliding scale   SubCutaneous three times a day before meals  insulin lispro (ADMELOG) corrective regimen sliding scale   SubCutaneous at bedtime  phenylephrine    Infusion 1 MICROgram(s)/kG/Min IV Continuous <Continuous>  piperacillin/tazobactam IVPB.. 3.375 Gram(s) IV Intermittent every 8 hours      PRN Meds:  dextrose Oral Gel 15 Gram(s) Oral once PRN  guaiFENesin Oral Liquid (Sugar-Free) 200 milliGRAM(s) Oral every 6 hours PRN      LABS:                        12.8   18.18 )-----------( 217      ( 21 Feb 2024 00:40 )             40.0     Hgb Trend: 12.8<--, 13.0<--, 12.8<--, 11.3<--  02-21    132<L>  |  101  |  23  ----------------------------<  368<H>  5.0   |  19<L>  |  0.59    Ca    8.1<L>      21 Feb 2024 00:40  Phos  2.7     02-21  Mg     2.20     02-21    TPro  6.2  /  Alb  2.8<L>  /  TBili  0.6  /  DBili  x   /  AST  262<H>  /  ALT  403<H>  /  AlkPhos  114  02-21    Creatinine Trend: 0.59<--, 0.43<--, 0.50<--, 0.57<--, 0.63<--, 0.57<--  PT/INR - ( 21 Feb 2024 00:40 )   PT: 53.4 sec;   INR: 5.00 ratio         PTT - ( 21 Feb 2024 00:40 )  PTT:34.1 sec  Urinalysis Basic - ( 21 Feb 2024 00:40 )    Color: x / Appearance: x / SG: x / pH: x  Gluc: 368 mg/dL / Ketone: x  / Bili: x / Urobili: x   Blood: x / Protein: x / Nitrite: x   Leuk Esterase: x / RBC: x / WBC x   Sq Epi: x / Non Sq Epi: x / Bacteria: x      Arterial Blood Gas:  02-20 @ 18:10  7.34/30/125/16/98.7/-8.3  ABG lactate: --    Venous Blood Gas:  02-21 @ 00:40  7.29/47/41/23/66.3  VBG Lactate: 2.4  Venous Blood Gas:  02-20 @ 17:35  7.28/51/22/24/28.0  VBG Lactate: 2.0  Venous Blood Gas:  02-20 @ 14:50  7.26/40/28/18/39.1  VBG Lactate: 6.3  Venous Blood Gas:  02-20 @ 11:44  7.22/54/27/22/38.0  VBG Lactate: 3.3  Venous Blood Gas:  02-20 @ 05:50  7.19/51/32/20/41.7  VBG Lactate: 3.3  Venous Blood Gas:  02-20 @ 04:10  7.21/43/31/17/42.8  VBG Lactate: 5.7  Venous Blood Gas:  02-20 @ 00:25  7.21/51/< 20/20/18.2  VBG Lactate: --  Venous Blood Gas:  02-19 @ 19:25  7.15/45/40/16/68.4  VBG Lactate: 3.9  Venous Blood Gas:  02-19 @ 16:46  7.16/41/50/15/74.5  VBG Lactate: 3.5  Venous Blood Gas:  02-19 @ 14:43  7.27/38/27/17/39.3  VBG Lactate: 4.0      MICROBIOLOGY:     Culture - Sputum (collected 20 Feb 2024 12:40)  Source: .Sputum Sputum  Gram Stain (20 Feb 2024 23:17):    Numerous polymorphonuclear leukocytes per low power field    Few Squamous epithelial cells per low power field    Moderate Gram positive cocci in pairs per oil power field    Few Gram Variable Rods per oil power field    Culture - Urine (collected 19 Feb 2024 18:18)  Source: Clean Catch Clean Catch (Midstream)  Preliminary Report (21 Feb 2024 00:32):    50,000 - 99,000 CFU/mL Escherichia coli    <10,000 CFU/ml Normal Urogenital vera present    Culture - Blood (collected 19 Feb 2024 15:21)  Source: .Blood Blood-Peripheral  Preliminary Report (20 Feb 2024 19:02):    No growth at 24 hours    Culture - Blood (collected 19 Feb 2024 14:43)  Source: .Blood Blood-Peripheral  Preliminary Report (20 Feb 2024 17:02):    No growth at 24 hours      RADIOLOGY:  [ ] Reviewed and interpreted by me             Interval Events:   -Amiodarone gtt started overnight for Afib RVR  -Lactate downtrending 6.3 > 2> 2.3    HPI:  68 year old female with PMH Mitral regurgitation, mechanical aortic valve replacement, rheumatic heart disease, atrial fibrillation, HTN, on Digoxin, Warfarin, DM 2 on Jardiance presenting with flu like symptoms from urgent care. 2 weeks ago patient started having cough, congestion, and intermittent fevers which improved. On 02/12 worsening URI with white sputum production. On 02/17 noticed dysuria and polyuria. Fevers improve with Tylenol. Went to urgent care today and found to be in Afib with RVR in the 180s and sent to the ED. No CP, SOB, abd pain, vomiting, diarrhea, edema, lightheadedness or dizziness, and palpitations.     In the ED Afebrile, BP of 90/60, HR in the 160s. Saturating well on room air. Received Acetaminophen, 2L IV bolus, 3 neosticks, started on Phenylephrine drip, and Lopressor 5 mg. Given Azithromycin and Ceftriaxone as well. Blood cultures pending.     Surgical History: Mitral valve repair, aortic valve repair  Allergies: NKDA   FH: None  SH: No smoking, no drinking, no illicit drug use    (19 Feb 2024 19:50)      REVIEW OF SYSTEMS:  Constitutional: [x] negative [ ] fevers [ ] chills [ ] weight loss [ ] weight gain  HEENT: [x] negative [ ] dry eyes [ ] eye irritation [ ] postnasal drip [ ] nasal congestion  CV: [x] negative  [ ] chest pain [ ] orthopnea [ ] palpitations [ ] murmur  Resp: [x] negative [ ] cough [ ] shortness of breath [ ] dyspnea [ ] wheezing [ ] sputum [ ] hemoptysis  GI: [ ] negative [ ] nausea [ ] vomiting [x] diarrhea [ ] constipation [ ] abd pain [ ] dysphagia   : [x] negative [ ] dysuria [ ] nocturia [ ] hematuria [ ] increased urinary frequency  Musculoskeletal: [x] negative [ ] back pain [ ] myalgias [ ] arthralgias [ ] fracture  Skin: [x] negative [ ] rash [ ] itch  Neurological: [x] negative [ ] headache [ ] dizziness [ ] syncope [ ] weakness [ ] numbness  Psychiatric: [x] negative [ ] anxiety [ ] depression  Endocrine: [ ] negative [x] diabetes [ ] thyroid problem  Hematologic/Lymphatic: [x] negative [ ] anemia [ ] bleeding problem  Allergic/Immunologic: [x] negative [ ] itchy eyes [ ] nasal discharge [ ] hives [ ] angioedema  [ ] All other systems negative  [ ] Unable to assess ROS because ________    OBJECTIVE:  ICU Vital Signs Last 24 Hrs  T(C): 36.8 (21 Feb 2024 04:00), Max: 37 (20 Feb 2024 20:00)  T(F): 98.3 (21 Feb 2024 04:00), Max: 98.6 (20 Feb 2024 20:00)  HR: 123 (21 Feb 2024 05:00) (107 - 153)  BP: 149/85 (21 Feb 2024 05:00) (92/71 - 149/85)  BP(mean): 102 (21 Feb 2024 05:00) (77 - 110)  ABP: --  ABP(mean): --  RR: 27 (21 Feb 2024 05:00) (22 - 36)  SpO2: 100% (21 Feb 2024 05:00) (98% - 100%)    O2 Parameters below as of 21 Feb 2024 06:00  Patient On (Oxygen Delivery Method): room air              02-19 @ 07:01  -  02-20 @ 07:00  --------------------------------------------------------  IN: 1914.5 mL / OUT: 750 mL / NET: 1164.5 mL    02-20 @ 07:01  -  02-21 @ 06:13  --------------------------------------------------------  IN: 1758.7 mL / OUT: 1780 mL / NET: -21.3 mL      CAPILLARY BLOOD GLUCOSE      POCT Blood Glucose.: 270 mg/dL (21 Feb 2024 02:02)      Physical Exam:   CONSTITUTIONAL: NAD, sitting up in chair  EYES: PERRLA and symmetric, EOMI, No conjunctival or scleral injection, non-icteric  ENMT: Oral mucosa with moist membranes. Normal dentition; no pharyngeal injection or exudates  NECK: Supple, symmetric and without tracheal deviation   RESP: No respiratory distress, no use of accessory muscles; CTA b/l, no WRR  CV: Irregularly irregular, +S1S2, no MRG; no JVD; no peripheral edema, (+) tachycardia   GI: Soft, NT, ND, no rebound, no guarding; no palpable masses; no hepatosplenomegaly; no hernia palpated  LYMPH: No cervical LAD or tenderness; no axillary LAD or tenderness; no inguinal LAD or tenderness  SKIN: No rashes or ulcers noted; no subcutaneous nodules or induration palpable  NEURO: Alert, awake, follows commands  PSYCH: Appropriate insight/judgment; A+O x 3, mood and affect appropriate, recent/remote memory intact        HOSPITAL MEDICATIONS:  Standing Meds:  aMIOdarone Infusion 0.5 mG/Min IV Continuous <Continuous>  aMIOdarone Infusion 1 mG/Min IV Continuous <Continuous>  dextrose 5%. 1000 milliLiter(s) IV Continuous <Continuous>  dextrose 5%. 1000 milliLiter(s) IV Continuous <Continuous>  dextrose 50% Injectable 25 Gram(s) IV Push once  dextrose 50% Injectable 12.5 Gram(s) IV Push once  dextrose 50% Injectable 25 Gram(s) IV Push once  digoxin     Tablet 125 MICROGram(s) Oral every other day  glucagon  Injectable 1 milliGRAM(s) IntraMuscular once  insulin glargine Injectable (LANTUS) 6 Unit(s) SubCutaneous <User Schedule>  insulin lispro (ADMELOG) corrective regimen sliding scale   SubCutaneous three times a day before meals  insulin lispro (ADMELOG) corrective regimen sliding scale   SubCutaneous at bedtime  phenylephrine    Infusion 1 MICROgram(s)/kG/Min IV Continuous <Continuous>  piperacillin/tazobactam IVPB.. 3.375 Gram(s) IV Intermittent every 8 hours      PRN Meds:  dextrose Oral Gel 15 Gram(s) Oral once PRN  guaiFENesin Oral Liquid (Sugar-Free) 200 milliGRAM(s) Oral every 6 hours PRN      LABS:                        12.8   18.18 )-----------( 217      ( 21 Feb 2024 00:40 )             40.0     Hgb Trend: 12.8<--, 13.0<--, 12.8<--, 11.3<--  02-21    132<L>  |  101  |  23  ----------------------------<  368<H>  5.0   |  19<L>  |  0.59    Ca    8.1<L>      21 Feb 2024 00:40  Phos  2.7     02-21  Mg     2.20     02-21    TPro  6.2  /  Alb  2.8<L>  /  TBili  0.6  /  DBili  x   /  AST  262<H>  /  ALT  403<H>  /  AlkPhos  114  02-21    Creatinine Trend: 0.59<--, 0.43<--, 0.50<--, 0.57<--, 0.63<--, 0.57<--  PT/INR - ( 21 Feb 2024 00:40 )   PT: 53.4 sec;   INR: 5.00 ratio         PTT - ( 21 Feb 2024 00:40 )  PTT:34.1 sec  Urinalysis Basic - ( 21 Feb 2024 00:40 )    Color: x / Appearance: x / SG: x / pH: x  Gluc: 368 mg/dL / Ketone: x  / Bili: x / Urobili: x   Blood: x / Protein: x / Nitrite: x   Leuk Esterase: x / RBC: x / WBC x   Sq Epi: x / Non Sq Epi: x / Bacteria: x      Arterial Blood Gas:  02-20 @ 18:10  7.34/30/125/16/98.7/-8.3  ABG lactate: --    Venous Blood Gas:  02-21 @ 00:40  7.29/47/41/23/66.3  VBG Lactate: 2.4  Venous Blood Gas:  02-20 @ 17:35  7.28/51/22/24/28.0  VBG Lactate: 2.0  Venous Blood Gas:  02-20 @ 14:50  7.26/40/28/18/39.1  VBG Lactate: 6.3  Venous Blood Gas:  02-20 @ 11:44  7.22/54/27/22/38.0  VBG Lactate: 3.3  Venous Blood Gas:  02-20 @ 05:50  7.19/51/32/20/41.7  VBG Lactate: 3.3  Venous Blood Gas:  02-20 @ 04:10  7.21/43/31/17/42.8  VBG Lactate: 5.7  Venous Blood Gas:  02-20 @ 00:25  7.21/51/< 20/20/18.2  VBG Lactate: --  Venous Blood Gas:  02-19 @ 19:25  7.15/45/40/16/68.4  VBG Lactate: 3.9  Venous Blood Gas:  02-19 @ 16:46  7.16/41/50/15/74.5  VBG Lactate: 3.5  Venous Blood Gas:  02-19 @ 14:43  7.27/38/27/17/39.3  VBG Lactate: 4.0      MICROBIOLOGY:     Culture - Sputum (collected 20 Feb 2024 12:40)  Source: .Sputum Sputum  Gram Stain (20 Feb 2024 23:17):    Numerous polymorphonuclear leukocytes per low power field    Few Squamous epithelial cells per low power field    Moderate Gram positive cocci in pairs per oil power field    Few Gram Variable Rods per oil power field    Culture - Urine (collected 19 Feb 2024 18:18)  Source: Clean Catch Clean Catch (Midstream)  Preliminary Report (21 Feb 2024 00:32):    50,000 - 99,000 CFU/mL Escherichia coli    <10,000 CFU/ml Normal Urogenital vera present    Culture - Blood (collected 19 Feb 2024 15:21)  Source: .Blood Blood-Peripheral  Preliminary Report (20 Feb 2024 19:02):    No growth at 24 hours    Culture - Blood (collected 19 Feb 2024 14:43)  Source: .Blood Blood-Peripheral  Preliminary Report (20 Feb 2024 17:02):    No growth at 24 hours      RADIOLOGY:  [ ] Reviewed and interpreted by me

## 2024-02-21 NOTE — PROGRESS NOTE ADULT - CRITICAL CARE ATTENDING COMMENT
69 yo female with h/o Afib,  mechanical Ao and mitral valve 2/2 to rheumatic disease,  on coumadin, digoxin, metoprolol, DM on Jardiance, presenting with waxing and waning flu'like illness x 2 week.     Patient also with dysuria, hematuria and some increased urinary frequency. Patient also noted to be septic, febril, in RVR requiring pressors. Previously on dig but was concern for dig toxic last admission. Also with LUISA, transaminitis and acidosis, concerning for possible euglycemic DKA.     Modest improved in BHB, but increasing lactate, TTE with new severe LV dysfunction, no acute ischemia on EKG and trops minimal and trended down.   Still with RVR, on amiodarone gtt, now on oral load. Up titrating BBs.     # HFrEF  # acute UTI  # Lactic acidosis  # DKA  # shock on pressors -resolved  # afib with RVR  # LUISA  # transaminitis  - Shock on pressors, now off, but still with Afib with RVR. New LV dysfunction in the setting of tachycardia and acute UTI/DKA  - Euglycemic dka, acidosis out of proportion to BHB which is improved. Now on basal bolus.   - Lactic acidosis was pending CT scan but now normalized, in the setting of new LV dysfunction and rvr. Continue to trend. If worsened will need CTA of A/P.   - LUISA improved continue to monitor  - Transaminitis, US with fatty liver, negative hep labs. . possible 2/2 to congestion. Improved, continue to monitor while on amiodarone.  - Afib with RVR- symptomatically worse with IV lopressions likely 2/2 to decompensated HFrEF. S/P lasix.  - C/W home dig, c/w lasix PRN, on amiodarone load,.   - F/U cards recs  - Continue to monitor on tele.   - On Zosyn for UTI, f/u with sensitivities. Low threshold for Pan- CT if lactate worsens.  - DVt pxp- on coumadin, mointor INR.   - Dispo- full code. 69 yo female with h/o Afib,  mechanical Ao and mitral valve 2/2 to rheumatic disease,  on coumadin, digoxin, metoprolol, DM on Jardiance, presenting with waxing and waning flu'like illness x 2 week.     Patient also with dysuria, hematuria and some increased urinary frequency. Patient also noted to be septic, febril, in RVR requiring pressors. Previously on dig but was concern for dig toxic last admission. Also with LUISA, transaminitis and acidosis, concerning for possible euglycemic DKA.     Modest improved in BHB, but increasing lactate, TTE with new severe LV dysfunction, no acute ischemia on EKG and trops minimal and trended down.   Still with RVR, on amiodarone gtt, now on oral load. Up titrating BBs.     # HFrEF  # acute UTI  # Lactic acidosis  # DKA  # shock on pressors -resolved  # afib with RVR  # LUISA  # transaminitis  # Diarrhea  - Shock on pressors, now off, but still with Afib with RVR. New LV dysfunction in the setting of tachycardia and acute UTI/DKA  - Euglycemic dka, acidosis out of proportion to BHB which is improved. Now on basal bolus.   - Lactic acidosis was pending CT scan but now normalized, in the setting of new LV dysfunction and rvr. Continue to trend. If worsened will need CTA of A/P.   - LUISA improved continue to monitor  - Transaminitis, US with fatty liver, negative hep labs. . possible 2/2 to congestion. Improved, continue to monitor while on amiodarone.  - Afib with RVR- symptomatically worse with IV lopressions likely 2/2 to decompensated HFrEF. S/P lasix.  - C/W home dig, c/w lasix PRN, on amiodarone load,.   - F/U cards recs  - Continue to monitor on tele.   - On Zosyn for UTI, f/u with sensitivities. Low threshold for Pan- CT if lactate worsens.  - Diarrhea- continue to monitor if recurrent, will need GI PCR and c.diff.   - DVt pxp- on coumadin, mointor INR.   - Dispo- full code.

## 2024-02-21 NOTE — DIETITIAN INITIAL EVALUATION ADULT - PERTINENT MEDS FT
MEDICATIONS  (STANDING):  aMIOdarone Infusion 0.5 mG/Min (16.7 mL/Hr) IV Continuous <Continuous>  aMIOdarone Infusion 1 mG/Min (33.3 mL/Hr) IV Continuous <Continuous>  dextrose 5%. 1000 milliLiter(s) (100 mL/Hr) IV Continuous <Continuous>  dextrose 5%. 1000 milliLiter(s) (50 mL/Hr) IV Continuous <Continuous>  dextrose 50% Injectable 25 Gram(s) IV Push once  dextrose 50% Injectable 12.5 Gram(s) IV Push once  dextrose 50% Injectable 25 Gram(s) IV Push once  digoxin     Tablet 125 MICROGram(s) Oral every other day  glucagon  Injectable 1 milliGRAM(s) IntraMuscular once  insulin glargine Injectable (LANTUS) 6 Unit(s) SubCutaneous <User Schedule>  insulin lispro (ADMELOG) corrective regimen sliding scale   SubCutaneous three times a day before meals  insulin lispro (ADMELOG) corrective regimen sliding scale   SubCutaneous at bedtime  metoprolol tartrate 25 milliGRAM(s) Oral every 12 hours  piperacillin/tazobactam IVPB.. 3.375 Gram(s) IV Intermittent every 8 hours    MEDICATIONS  (PRN):  dextrose Oral Gel 15 Gram(s) Oral once PRN Blood Glucose LESS THAN 70 milliGRAM(s)/deciliter  guaiFENesin Oral Liquid (Sugar-Free) 200 milliGRAM(s) Oral every 6 hours PRN Cough  sodium chloride 0.65% Nasal 1 Spray(s) Both Nostrils two times a day PRN Nasal Congestion

## 2024-02-21 NOTE — PROGRESS NOTE ADULT - ASSESSMENT
68 y.o F with hx of rheumatic heart disease s/p mechanical aortic and mitral valve replacements on coumadin, a.fib, DM2 who presented to the ED with tachycardia found to be in rapid atrial fibrillation with hypotension breifly requiring pressors in the setting of fevers and chills concerning for septic shock. Her course was also complicated by DKA requiring fluid resuscitation and insulin drip. She had a TTE while in the tachyarrhythmia which demonstrated moderately reduced LVEF, however she remains in rapid atrial fibrillation.     1. Atrial Fibrillation ([id number] = 3)    - Likely triggered by acute infection/DKA  - Metoprolol 25mg Q6 goal HR <110  - Coumadin  - Would not continue amiodarone beyond initial load  - Currently on digoxin 125mcg daily  - Keep K > 4 and Mg >2  - Telemetry monitoring    2. Mechanical MR and AV  - Continue coumadin  - INR goal 2.5- 3.5    3. Moderately reduced LV EF  - Echo was done with patient particulary tachycardic   - Would repeat limited TTE for LV function once patient is better rate controlled    Recommendations are preliminary until attending attestation.    Lynsey Rodriguez MD  Cardiology Fellow 68 y.o F with hx of rheumatic heart disease s/p mechanical aortic and mitral valve replacements on coumadin, a.fib, DM2 who presented to the ED with tachycardia found to be in rapid atrial fibrillation with hypotension briefly requiring pressors in the setting of fevers and chills concerning for septic shock. Her course was also complicated by DKA requiring fluid resuscitation and insulin drip. She had a TTE while in the tachyarrhythmia which demonstrated moderately reduced LVEF, however she remains in rapid atrial fibrillation.     1. Atrial Fibrillation ([id number] = 3)    - Likely triggered by acute infection/DKA  - Metoprolol 25mg Q6 goal HR <110  - Coumadin  - Would not continue amiodarone beyond initial load  - Currently on digoxin 125mcg daily  - Keep K > 4 and Mg >2  - Telemetry monitoring    2. Mechanical MR and AV  - Continue coumadin  - INR goal 2.5- 3.5    3. Moderately reduced LV EF  - Echo was done with patient particulary tachycardic   - Would repeat limited TTE for LV function once patient is better rate controlled    Recommendations are preliminary until attending attestation.    Lynsey Rodriguez MD  Cardiology Fellow

## 2024-02-21 NOTE — DIETITIAN NUTRITION RISK NOTIFICATION - TREATMENT: THE FOLLOWING DIET HAS BEEN RECOMMENDED
Diet, DASH/TLC:   Sodium & Cholesterol Restricted  Consistent Carbohydrate {Evening Snack} (CSTCHOSN) (02-20-24 @ 21:32) [Active]

## 2024-02-22 ENCOUNTER — RESULT REVIEW (OUTPATIENT)
Age: 69
End: 2024-02-22

## 2024-02-22 LAB
-  AMOXICILLIN/CLAVULANIC ACID: SIGNIFICANT CHANGE UP
-  AMPICILLIN/SULBACTAM: SIGNIFICANT CHANGE UP
-  AMPICILLIN: SIGNIFICANT CHANGE UP
-  AZTREONAM: SIGNIFICANT CHANGE UP
-  CEFAZOLIN: SIGNIFICANT CHANGE UP
-  CEFEPIME: SIGNIFICANT CHANGE UP
-  CEFOXITIN: SIGNIFICANT CHANGE UP
-  CEFTRIAXONE: SIGNIFICANT CHANGE UP
-  CEFUROXIME: SIGNIFICANT CHANGE UP
-  CIPROFLOXACIN: SIGNIFICANT CHANGE UP
-  ERTAPENEM: SIGNIFICANT CHANGE UP
-  GENTAMICIN: SIGNIFICANT CHANGE UP
-  IMIPENEM: SIGNIFICANT CHANGE UP
-  LEVOFLOXACIN: SIGNIFICANT CHANGE UP
-  MEROPENEM: SIGNIFICANT CHANGE UP
-  NITROFURANTOIN: SIGNIFICANT CHANGE UP
-  PIPERACILLIN/TAZOBACTAM: SIGNIFICANT CHANGE UP
-  TOBRAMYCIN: SIGNIFICANT CHANGE UP
-  TRIMETHOPRIM/SULFAMETHOXAZOLE: SIGNIFICANT CHANGE UP
ALBUMIN SERPL ELPH-MCNC: 3.1 G/DL — LOW (ref 3.3–5)
ALP SERPL-CCNC: 160 U/L — HIGH (ref 40–120)
ALT FLD-CCNC: 445 U/L — HIGH (ref 4–33)
ANION GAP SERPL CALC-SCNC: 10 MMOL/L — SIGNIFICANT CHANGE UP (ref 7–14)
APTT BLD: 46.5 SEC — HIGH (ref 24.5–35.6)
AST SERPL-CCNC: 188 U/L — HIGH (ref 4–32)
BASE EXCESS BLDV CALC-SCNC: -0.2 MMOL/L — SIGNIFICANT CHANGE UP (ref -2–3)
BILIRUB SERPL-MCNC: 0.5 MG/DL — SIGNIFICANT CHANGE UP (ref 0.2–1.2)
BLOOD GAS VENOUS COMPREHENSIVE RESULT: SIGNIFICANT CHANGE UP
BUN SERPL-MCNC: 19 MG/DL — SIGNIFICANT CHANGE UP (ref 7–23)
CALCIUM SERPL-MCNC: 8.2 MG/DL — LOW (ref 8.4–10.5)
CHLORIDE BLDV-SCNC: 101 MMOL/L — SIGNIFICANT CHANGE UP (ref 96–108)
CHLORIDE SERPL-SCNC: 101 MMOL/L — SIGNIFICANT CHANGE UP (ref 98–107)
CO2 BLDV-SCNC: 26.1 MMOL/L — HIGH (ref 22–26)
CO2 SERPL-SCNC: 24 MMOL/L — SIGNIFICANT CHANGE UP (ref 22–31)
CREAT SERPL-MCNC: 0.48 MG/DL — LOW (ref 0.5–1.3)
CULTURE RESULTS: ABNORMAL
CULTURE RESULTS: ABNORMAL
EGFR: 104 ML/MIN/1.73M2 — SIGNIFICANT CHANGE UP
GAS PNL BLDV: 128 MMOL/L — LOW (ref 136–145)
GLUCOSE BLDC GLUCOMTR-MCNC: 175 MG/DL — HIGH (ref 70–99)
GLUCOSE BLDC GLUCOMTR-MCNC: 194 MG/DL — HIGH (ref 70–99)
GLUCOSE BLDC GLUCOMTR-MCNC: 208 MG/DL — HIGH (ref 70–99)
GLUCOSE BLDC GLUCOMTR-MCNC: 222 MG/DL — HIGH (ref 70–99)
GLUCOSE BLDV-MCNC: 193 MG/DL — HIGH (ref 70–99)
GLUCOSE SERPL-MCNC: 199 MG/DL — HIGH (ref 70–99)
HCO3 BLDV-SCNC: 25 MMOL/L — SIGNIFICANT CHANGE UP (ref 22–29)
HCT VFR BLD CALC: 37.9 % — SIGNIFICANT CHANGE UP (ref 34.5–45)
HCT VFR BLDA CALC: 38 % — SIGNIFICANT CHANGE UP (ref 34.5–46.5)
HGB BLD CALC-MCNC: 12.5 G/DL — SIGNIFICANT CHANGE UP (ref 11.7–16.1)
HGB BLD-MCNC: 12.2 G/DL — SIGNIFICANT CHANGE UP (ref 11.5–15.5)
INR BLD: 4.63 RATIO — HIGH (ref 0.85–1.18)
LACTATE BLDV-MCNC: 1.8 MMOL/L — SIGNIFICANT CHANGE UP (ref 0.5–2)
MAGNESIUM SERPL-MCNC: 1.8 MG/DL — SIGNIFICANT CHANGE UP (ref 1.6–2.6)
MCHC RBC-ENTMCNC: 28.6 PG — SIGNIFICANT CHANGE UP (ref 27–34)
MCHC RBC-ENTMCNC: 32.2 GM/DL — SIGNIFICANT CHANGE UP (ref 32–36)
MCV RBC AUTO: 88.8 FL — SIGNIFICANT CHANGE UP (ref 80–100)
METHOD TYPE: SIGNIFICANT CHANGE UP
NRBC # BLD: 0 /100 WBCS — SIGNIFICANT CHANGE UP (ref 0–0)
NRBC # FLD: 0.02 K/UL — HIGH (ref 0–0)
ORGANISM # SPEC MICROSCOPIC CNT: ABNORMAL
ORGANISM # SPEC MICROSCOPIC CNT: ABNORMAL
PCO2 BLDV: 41 MMHG — SIGNIFICANT CHANGE UP (ref 39–52)
PH BLDV: 7.39 — SIGNIFICANT CHANGE UP (ref 7.32–7.43)
PHOSPHATE SERPL-MCNC: 1.4 MG/DL — LOW (ref 2.5–4.5)
PLATELET # BLD AUTO: 246 K/UL — SIGNIFICANT CHANGE UP (ref 150–400)
PO2 BLDV: 73 MMHG — HIGH (ref 25–45)
POTASSIUM BLDV-SCNC: 4.2 MMOL/L — SIGNIFICANT CHANGE UP (ref 3.5–5.1)
POTASSIUM SERPL-MCNC: 4.3 MMOL/L — SIGNIFICANT CHANGE UP (ref 3.5–5.3)
POTASSIUM SERPL-SCNC: 4.3 MMOL/L — SIGNIFICANT CHANGE UP (ref 3.5–5.3)
PROT SERPL-MCNC: 5.9 G/DL — LOW (ref 6–8.3)
PROTHROM AB SERPL-ACNC: 50 SEC — HIGH (ref 9.5–13)
RBC # BLD: 4.27 M/UL — SIGNIFICANT CHANGE UP (ref 3.8–5.2)
RBC # FLD: 14.7 % — HIGH (ref 10.3–14.5)
SAO2 % BLDV: 94.7 % — HIGH (ref 67–88)
SODIUM SERPL-SCNC: 135 MMOL/L — SIGNIFICANT CHANGE UP (ref 135–145)
SPECIMEN SOURCE: SIGNIFICANT CHANGE UP
SPECIMEN SOURCE: SIGNIFICANT CHANGE UP
WBC # BLD: 12.71 K/UL — HIGH (ref 3.8–10.5)
WBC # FLD AUTO: 12.71 K/UL — HIGH (ref 3.8–10.5)

## 2024-02-22 PROCEDURE — 99291 CRITICAL CARE FIRST HOUR: CPT

## 2024-02-22 PROCEDURE — 99233 SBSQ HOSP IP/OBS HIGH 50: CPT

## 2024-02-22 PROCEDURE — 99232 SBSQ HOSP IP/OBS MODERATE 35: CPT

## 2024-02-22 PROCEDURE — 93321 DOPPLER ECHO F-UP/LMTD STD: CPT | Mod: 26

## 2024-02-22 PROCEDURE — 93308 TTE F-UP OR LMTD: CPT | Mod: 26,GC

## 2024-02-22 RX ORDER — CEFTRIAXONE 500 MG/1
1000 INJECTION, POWDER, FOR SOLUTION INTRAMUSCULAR; INTRAVENOUS EVERY 24 HOURS
Refills: 0 | Status: DISCONTINUED | OUTPATIENT
Start: 2024-02-22 | End: 2024-02-22

## 2024-02-22 RX ORDER — CEFTRIAXONE 500 MG/1
INJECTION, POWDER, FOR SOLUTION INTRAMUSCULAR; INTRAVENOUS
Refills: 0 | Status: COMPLETED | OUTPATIENT
Start: 2024-02-22 | End: 2024-02-25

## 2024-02-22 RX ORDER — INSULIN GLARGINE 100 [IU]/ML
12 INJECTION, SOLUTION SUBCUTANEOUS
Refills: 0 | Status: DISCONTINUED | OUTPATIENT
Start: 2024-02-22 | End: 2024-02-24

## 2024-02-22 RX ORDER — METOPROLOL TARTRATE 50 MG
100 TABLET ORAL
Refills: 0 | Status: DISCONTINUED | OUTPATIENT
Start: 2024-02-22 | End: 2024-02-28

## 2024-02-22 RX ORDER — CEFTRIAXONE 500 MG/1
1000 INJECTION, POWDER, FOR SOLUTION INTRAMUSCULAR; INTRAVENOUS EVERY 24 HOURS
Refills: 0 | Status: COMPLETED | OUTPATIENT
Start: 2024-02-23 | End: 2024-02-25

## 2024-02-22 RX ORDER — CEFTRIAXONE 500 MG/1
1000 INJECTION, POWDER, FOR SOLUTION INTRAMUSCULAR; INTRAVENOUS ONCE
Refills: 0 | Status: COMPLETED | OUTPATIENT
Start: 2024-02-22 | End: 2024-02-22

## 2024-02-22 RX ORDER — METOPROLOL TARTRATE 50 MG
5 TABLET ORAL ONCE
Refills: 0 | Status: DISCONTINUED | OUTPATIENT
Start: 2024-02-22 | End: 2024-02-22

## 2024-02-22 RX ADMIN — PIPERACILLIN AND TAZOBACTAM 25 GRAM(S): 4; .5 INJECTION, POWDER, LYOPHILIZED, FOR SOLUTION INTRAVENOUS at 09:12

## 2024-02-22 RX ADMIN — CEFTRIAXONE 100 MILLIGRAM(S): 500 INJECTION, POWDER, FOR SOLUTION INTRAMUSCULAR; INTRAVENOUS at 13:46

## 2024-02-22 RX ADMIN — Medication 3 UNIT(S): at 11:48

## 2024-02-22 RX ADMIN — INSULIN GLARGINE 12 UNIT(S): 100 INJECTION, SOLUTION SUBCUTANEOUS at 17:11

## 2024-02-22 RX ADMIN — Medication 100 MILLIGRAM(S): at 14:05

## 2024-02-22 RX ADMIN — Medication 1: at 11:49

## 2024-02-22 RX ADMIN — Medication 200 MILLIGRAM(S): at 22:43

## 2024-02-22 RX ADMIN — Medication 1: at 09:08

## 2024-02-22 RX ADMIN — Medication 50 MILLIGRAM(S): at 05:33

## 2024-02-22 RX ADMIN — AMIODARONE HYDROCHLORIDE 400 MILLIGRAM(S): 400 TABLET ORAL at 13:34

## 2024-02-22 RX ADMIN — Medication 3 UNIT(S): at 09:08

## 2024-02-22 RX ADMIN — Medication 2: at 17:05

## 2024-02-22 RX ADMIN — Medication 3 UNIT(S): at 17:05

## 2024-02-22 RX ADMIN — AMIODARONE HYDROCHLORIDE 400 MILLIGRAM(S): 400 TABLET ORAL at 05:34

## 2024-02-22 NOTE — PROGRESS NOTE ADULT - ASSESSMENT
68-yo Female with PMHx of mitral regurgitation, rheumatic heart disease s/p mechanical AVR/MVR, HTN, chronic afib on digoxin + warfarin, DM2 on Jardiance, who was admitted to MICU w/septic shock 2' UTI, afib w/RVR, and euglycemic DKA    Septic shock 2' UTI  - admitted to MICU  - s/p IVF resuscitation  - weaned off pressor support 2/20/24  - blood cxs 2/19/24 --> (-)  - urine cx 2/19/24 --> non-ESBL E coli  - received ceftriaxone/azithromycin x 1 2/19/24  - zosyn 2/20/24 --> 2/22/24  - ceftriaxone 2/22/4 -->   - appreciate MICU care, ID input    Chronic atrial fibrillation with RVR  - s/p amiodarone bolus (2/20)  continue Amiodarone gtt x 24 hours, then PO amio load  - TTE 2/20  LV systolic function is severely decreased with EF 37 % a change from previous echo 11/23 will repeat ECHO today 2/22  - continue warfarin, INR goal 2.5-3.5; hold off warfarin 2/22/24  - c/w digoxin every other day and lopressor   - digoxin by level, draw 2/23  - appreciate cardiology    Mechanical aortic/mitral valve replacement in setting of rheumatic heart disease  - continue warfarin, INR goal 2.5-3.5; hold off warfarin 2/22/24    Euglycemic DKA  - A1c 6.9%  - ED: pH 7.27 AG 20, Bicarb 15, Glucose 201, Lactate 4  - s/p 4 L IVF in ED  - transitioned off insulin with 6 units Lantus (2/20)  - basal/bolus insulin dosing as per endocrinology  - continue low admelog correction scales before meals and bedtime  - follow up with outpatient endocrinologist Dr. Ch  - stop jardiance on discharge given urosepsis and EDKA. can consider resuming in future as outpt  - will need to trend lactate closer to discharge to see if metformin can be safely resumed ( had decreased to 1.4, then increased to 2.1), if so then may resume janumet 50-1000mg BID, may need additional agent as well depending on blood glucose values  - appreciate endocrinology    LUISA  - Cr from 11/2023 of 0.39  - admission Cr of 0.89  - LUISA likely pre-renal in the setting of shock  - s/p lasix 40mg IVP (2/21)   - diuretics PRN goal to keep net even  - indwelling catheter placed 2/20 was making good UO will do TOV   - resolved    HTN  - holding home Enalapril in the setting of hypotension for now     HLD  - atorvastatin 20 mg daily held iso elevated LFT's    Transaminitis   - unknown etiology  - hepatitis panel neg  - RUQ US 2/20 w/findings suggestive of steatosis, cholelithiasis, GB nonspecific mural thickening  - continue to trend     Need for prophylaxis  - INR supratherapeutic         68-yo Female with PMHx of mitral regurgitation, rheumatic heart disease s/p mechanical AVR/MVR, HTN, chronic afib on digoxin + warfarin, DM2 on Jardiance, who was admitted to MICU w/septic shock 2' UTI, afib w/RVR, and euglycemic DKA    Septic shock 2' UTI  - admitted to MICU  - s/p IVF resuscitation  - weaned off pressor support 2/20/24  - blood cxs 2/19/24 --> (-)  - urine cx 2/19/24 --> non-ESBL E coli  - received ceftriaxone/azithromycin x 1 2/19/24  - zosyn 2/20/24 --> 2/22/24  - ceftriaxone 2/22/24 -->   - appreciate MICU care, ID input    Chronic atrial fibrillation with RVR  - s/p amiodarone bolus (2/20)  continue Amiodarone gtt x 24 hours, then PO amio load  - TTE 2/20  LV systolic function is severely decreased with EF 37 % a change from previous echo 11/23 will repeat ECHO today 2/22  - continue warfarin, INR goal 2.5-3.5; hold off warfarin 2/22/24  - c/w digoxin every other day and lopressor   - digoxin by level, draw 2/23  - appreciate cardiology    Mechanical aortic/mitral valve replacement in setting of rheumatic heart disease  - continue warfarin, INR goal 2.5-3.5; hold off warfarin 2/22/24    Euglycemic DKA  - A1c 6.9%  - ED: pH 7.27 AG 20, Bicarb 15, Glucose 201, Lactate 4  - s/p 4 L IVF in ED  - transitioned off insulin with 6 units Lantus (2/20)  - basal/bolus insulin dosing as per endocrinology  - continue low admelog correction scales before meals and bedtime  - follow up with outpatient endocrinologist Dr. Ch  - stop jardiance on discharge given urosepsis and EDKA. can consider resuming in future as outpt  - will need to trend lactate closer to discharge to see if metformin can be safely resumed ( had decreased to 1.4, then increased to 2.1), if so then may resume janumet 50-1000mg BID, may need additional agent as well depending on blood glucose values  - appreciate endocrinology    LUISA  - Cr from 11/2023 of 0.39  - admission Cr of 0.89  - LUISA likely pre-renal in the setting of shock  - s/p lasix 40mg IVP (2/21)   - diuretics PRN goal to keep net even  - indwelling catheter placed 2/20 was making good UO will do TOV   - resolved    HTN  - holding home Enalapril in the setting of hypotension for now     HLD  - atorvastatin 20 mg daily held iso elevated LFT's    Transaminitis   - unknown etiology  - hepatitis panel neg  - RUQ US 2/20 w/findings suggestive of steatosis, cholelithiasis, GB nonspecific mural thickening  - continue to trend     Need for prophylaxis  - INR supratherapeutic

## 2024-02-22 NOTE — CONSULT NOTE ADULT - ASSESSMENT
67 y/o F PMhx mechanical aortic replacement, rheumatic heart disease, mitral valve repair, atrial fibrillation, HTN, DM 2 on Jardiance who presented w/ URI symptoms and dysuria  found to have septic shock, DKA, UTI, elevated liver enzymes    UTI  septic shock- resolved  dysuria, frequency, urgency on admission  no flank tenderness  urine cx w/ E Coli, sensitivities reviewed  switched from zosyn to ceftriaxone today    elevated liver enzymes  hepatic viral serologies negative  abd US- suggestive of steatosis. Cholelithiasis. Nonspecific mild mural thickening. Findings likely associated with underlying liver disease.    Recommendations  c/w ceftriaxone 1g daily  -anticipate 7 day course w/ last day 2/25  trend wbc    Trell Franklin M.D.  [facility], Division of Infectious Diseases  [phone]  After 5pm on weekdays and all day on weekends - please call [phone]

## 2024-02-22 NOTE — CONSULT NOTE ADULT - SUBJECTIVE AND OBJECTIVE BOX
[facility], Division of Infectious Diseases  CHAYO Melendez S. Shah, Y. Patel, G. Rigoberto  [phone]  ([phone] - weekdays after 5pm and weekends)    NAOMI RUBALCAVA  68y, Female  [id number]    HPI--  HPI:  69 y/o F PMhx mechanical aortic replacement, rheumatic heart disease, mitral valve repair, atrial fibrillation, HTN, DM 2 on Jardiance who presented w/ URI symptoms and dysuria. Reports initially had productive cough since around 2/12. On 2/15 she developed hematuria and on 2/17 began to have dysuria, frequency and urgency followed by fever on 2/18. She went to urgent care, found to have Afib with RVR in the 180s and sent to the ED.   She was started on pressors, found to have DKA, UTI and was started on antibiotics.    ROS: 10 point review of systems completed, pertinent positives and negatives as per HPI.    Allergies: No Known Allergies    PMH -- T2DM (type 2 diabetes mellitus)    H/O aortic valve replacement    H/O mitral valve repair    H/O mitral valve repair    Chronic atrial fibrillation      PSH -- H/O aortic valve replacement    H/O mitral valve repair    H/O mechanical aortic valve replacement    History of mitral valve replacement with mechanical valve    H/O mitral valve replacement with mechanical valve    H/O mechanical aortic valve replacement      FH -- No pertinent family history in first degree relatives      Social History -- denies tobacco, alcohol or illicit drug use    Physical Exam--  Vital Signs Last 24 Hrs  T(F): 97.5 (22 Feb 2024 12:00), Max: 99.4 (21 Feb 2024 16:00)  HR: 126 (22 Feb 2024 12:00) (86 - 150)  BP: 137/115 (22 Feb 2024 12:00) (106/59 - 144/89)  RR: 24 (22 Feb 2024 12:00) (18 - 35)  SpO2: 96% (22 Feb 2024 12:00) (96% - 100%)  General: nontoxic-appearing, no acute distress  HEENT: anicteric  Lungs: Clear bilaterally without rales  Heart: S1, S2, normal rate.   Abdomen: Soft. Nondistended. Nontender.   Neuro: AAOx3  Back: No costovertebral angle tenderness.  Extremities: No LE edema.   Skin: Warm. Dry. No rash.  Psychiatric: Appropriate affect and mood for situation.     Laboratory & Imaging Data--  CBC:                       12.2   12.71 )-----------( 246      ( 22 Feb 2024 00:21 )             37.9     WBC Count: 12.71 K/uL (02-22-24 @ 00:21)  WBC Count: 15.00 K/uL (02-21-24 @ 16:36)  WBC Count: 18.18 K/uL (02-21-24 @ 00:40)  WBC Count: 18.80 K/uL (02-20-24 @ 18:10)  WBC Count: 15.32 K/uL (02-20-24 @ 00:25)  WBC Count: 9.72 K/uL (02-19-24 @ 14:43)    CMP: 02-22    135  |  101  |  19  ----------------------------<  199<H>  4.3   |  24  |  0.48<L>    Ca    8.2<L>      22 Feb 2024 00:21  Phos  1.4     02-22  Mg     1.80     02-22    TPro  5.9<L>  /  Alb  3.1<L>  /  TBili  0.5  /  DBili  x   /  AST  188<H>  /  ALT  445<H>  /  AlkPhos  160<H>  02-22    LIVER FUNCTIONS - ( 22 Feb 2024 00:21 )  Alb: 3.1 g/dL / Pro: 5.9 g/dL / ALK PHOS: 160 U/L / ALT: 445 U/L / AST: 188 U/L / GGT: x           Urinalysis Basic - ( 22 Feb 2024 00:21 )    Color: x / Appearance: x / SG: x / pH: x  Gluc: 199 mg/dL / Ketone: x  / Bili: x / Urobili: x   Blood: x / Protein: x / Nitrite: x   Leuk Esterase: x / RBC: x / WBC x   Sq Epi: x / Non Sq Epi: x / Bacteria: x      Microbiology:     Culture - Sputum (collected 02-20-24 @ 12:40)  Source: .Sputum Sputum  Gram Stain (02-20-24 @ 23:17):    Numerous polymorphonuclear leukocytes per low power field    Few Squamous epithelial cells per low power field    Moderate Gram positive cocci in pairs per oil power field    Few Gram Variable Rods per oil power field  Final Report (02-22-24 @ 06:52):    Normal Respiratory Vera present    Culture - Urine (collected 02-20-24 @ 01:10)  Source: Catheterized Catheterized  Final Report (02-21-24 @ 07:26):    <10,000 CFU/mL Normal Urogenital Vera    Culture - Urine (collected 02-19-24 @ 18:18)  Source: Clean Catch Clean Catch (Midstream)  Final Report (02-22-24 @ 09:45):    50,000 - 99,000 CFU/mL Escherichia coli    <10,000 CFU/ml Normal Urogenital vera present  Organism: Escherichia coli (02-22-24 @ 09:45)  Organism: Escherichia coli (02-22-24 @ 09:45)      Method Type: TUNDE      -  Amoxicillin/Clavulanic Acid: S <=8/4      -  Ampicillin: R >16 These ampicillin results predict results for amoxicillin      -  Ampicillin/Sulbactam: I 16/8      -  Aztreonam: S <=4      -  Cefazolin: S <=2 For uncomplicated UTI with K. pneumoniae, E. coli, or P. mirablis: TUNDE <=16 is sensitive and TUNDE >=32 is resistant. This also predicts results for oral agents cefaclor, cefdinir, cefpodoxime, cefprozil, cefuroxime axetil, cephalexin and locarbef for uncomplicated UTI. Note that some isolates may be susceptible to these agents while testing resistant to cefazolin.      -  Cefepime: S <=2      -  Cefoxitin: S <=8      -  Ceftriaxone: S <=1      -  Cefuroxime: S <=4      -  Ciprofloxacin: S <=0.25      -  Ertapenem: S <=0.5      -  Gentamicin: R >8      -  Imipenem: S <=1      -  Levofloxacin: S <=0.5      -  Meropenem: S <=1      -  Nitrofurantoin: S <=32 Should not be used to treat pyelonephritis      -  Piperacillin/Tazobactam: S <=8      -  Tobramycin: R 8      -  Trimethoprim/Sulfamethoxazole: S <=0.5/9.5    Culture - Blood (collected 02-19-24 @ 15:21)  Source: .Blood Blood-Peripheral  Preliminary Report (02-21-24 @ 19:01):    No growth at 48 Hours    Culture - Blood (collected 02-19-24 @ 14:43)  Source: .Blood Blood-Peripheral  Preliminary Report (02-21-24 @ 17:02):    No growth at 48 Hours        Radiology--  ***  Active Medications--  aMIOdarone    Tablet   Oral   aMIOdarone    Tablet 400 milliGRAM(s) Oral every 8 hours  cefTRIAXone   IVPB 1000 milliGRAM(s) IV Intermittent every 24 hours  dextrose 5%. 1000 milliLiter(s) IV Continuous <Continuous>  dextrose 5%. 1000 milliLiter(s) IV Continuous <Continuous>  dextrose 50% Injectable 25 Gram(s) IV Push once  dextrose 50% Injectable 12.5 Gram(s) IV Push once  dextrose 50% Injectable 25 Gram(s) IV Push once  dextrose Oral Gel 15 Gram(s) Oral once PRN  digoxin     Tablet 125 MICROGram(s) Oral every other day  glucagon  Injectable 1 milliGRAM(s) IntraMuscular once  guaiFENesin Oral Liquid (Sugar-Free) 200 milliGRAM(s) Oral every 6 hours PRN  insulin glargine Injectable (LANTUS) 10 Unit(s) SubCutaneous <User Schedule>  insulin lispro (ADMELOG) corrective regimen sliding scale   SubCutaneous three times a day before meals  insulin lispro (ADMELOG) corrective regimen sliding scale   SubCutaneous at bedtime  insulin lispro Injectable (ADMELOG) 3 Unit(s) SubCutaneous before lunch  insulin lispro Injectable (ADMELOG) 3 Unit(s) SubCutaneous before breakfast  insulin lispro Injectable (ADMELOG) 3 Unit(s) SubCutaneous before dinner  metoprolol succinate  milliGRAM(s) Oral two times a day  metoprolol tartrate Injectable 5 milliGRAM(s) IV Push once  sodium chloride 0.65% Nasal 1 Spray(s) Both Nostrils two times a day PRN    Antimicrobials:   cefTRIAXone   IVPB 1000 milliGRAM(s) IV Intermittent every 24 hours    Immunologic:

## 2024-02-22 NOTE — PROGRESS NOTE ADULT - SUBJECTIVE AND OBJECTIVE BOX
Subjective    Patient seen and examined at bedside.  No chest pain, shortness of breath, or palpitations            Telemetry review: rate controlled A.fib HRs 80s    Current Meds:  aMIOdarone    Tablet   Oral   aMIOdarone    Tablet 400 milliGRAM(s) Oral every 8 hours  aMIOdarone Infusion 1 mG/Min IV Continuous <Continuous>  dextrose 5%. 1000 milliLiter(s) IV Continuous <Continuous>  dextrose 5%. 1000 milliLiter(s) IV Continuous <Continuous>  dextrose 50% Injectable 25 Gram(s) IV Push once  dextrose 50% Injectable 12.5 Gram(s) IV Push once  dextrose 50% Injectable 25 Gram(s) IV Push once  dextrose Oral Gel 15 Gram(s) Oral once PRN  digoxin     Tablet 125 MICROGram(s) Oral every other day  glucagon  Injectable 1 milliGRAM(s) IntraMuscular once  guaiFENesin Oral Liquid (Sugar-Free) 200 milliGRAM(s) Oral every 6 hours PRN  insulin glargine Injectable (LANTUS) 10 Unit(s) SubCutaneous <User Schedule>  insulin lispro (ADMELOG) corrective regimen sliding scale   SubCutaneous three times a day before meals  insulin lispro (ADMELOG) corrective regimen sliding scale   SubCutaneous at bedtime  insulin lispro Injectable (ADMELOG) 3 Unit(s) SubCutaneous before breakfast  insulin lispro Injectable (ADMELOG) 3 Unit(s) SubCutaneous before dinner  insulin lispro Injectable (ADMELOG) 3 Unit(s) SubCutaneous before lunch  metoprolol tartrate 50 milliGRAM(s) Oral every 8 hours  piperacillin/tazobactam IVPB.. 3.375 Gram(s) IV Intermittent every 8 hours  sodium chloride 0.65% Nasal 1 Spray(s) Both Nostrils two times a day PRN      Vitals:  T(F): 97.4 (02-22), Max: 99.4 (02-21)  HR: 97 (02-22) (86 - 150)  BP: 123/73 (02-22) (106/59 - 144/89)  RR: 26 (02-22)  SpO2: 100% (02-22)  I&O's Summary    21 Feb 2024 07:01  -  22 Feb 2024 07:00  --------------------------------------------------------  IN: 283.7 mL / OUT: 1895 mL / NET: -1611.3 mL    Physical Exam:  General: No acute distress; well appearing  Cardiovascular: Irregularly irregular, with loud metallic click consistent with mechanical heart valve  Respiratory: Clear to auscultation bilaterally, speaking full sentences  Gastrointestinal: soft, non-tender, non-distended with normal bowel sounds  Extremities: 2+ pulses, no clubbing; no joint deformity, or edema  Neurologic: AAOx3,  Non-focal                          12.2   12.71 )-----------( 246      ( 22 Feb 2024 00:21 )             37.9     02-22    135  |  101  |  19  ----------------------------<  199<H>  4.3   |  24  |  0.48<L>    Ca    8.2<L>      22 Feb 2024 00:21  Phos  1.4     02-22  Mg     1.80     02-22    TPro  5.9<L>  /  Alb  3.1<L>  /  TBili  0.5  /  DBili  x   /  AST  188<H>  /  ALT  445<H>  /  AlkPhos  160<H>  02-22    PT/INR - ( 22 Feb 2024 00:21 )   PT: 50.0 sec;   INR: 4.63 ratio         PTT - ( 22 Feb 2024 00:21 )  PTT:46.5 sec  CARDIAC MARKERS ( 20 Feb 2024 11:44 )  62 ng/L / x     / x     / x     / x     / x      CARDIAC MARKERS ( 19 Feb 2024 16:45 )  98 ng/L / x     / x     / x     / x     / 2.7 ng/mL  CARDIAC MARKERS ( 19 Feb 2024 14:43 )  103 ng/L / x     / x     / x     / x     / x        _______________________________________________________________________________________     CONCLUSIONS:      1. Techincally difficult study to assess left ventricular function given patients tachyarrhythmia. The LV function appears moderately to severely depressed.   2. The right ventricle is not well visualized.   3. Mechanical valve present in the mitral position.   4. A mechanical valve replacement is present in the aortic position. Mild intravalvular regurgitation.   5. Mild to moderate tricuspid regurgitation.   6. Technically difficult image quality.    ________________________________________________________________________________________  FINDINGS:     Left Ventricle:  The left ventricular cavity is small. Left ventricular systolic function is severely decreased with a calculated ejection fraction of 37 % by the Bunch's biplane method of disks. There is global left ventricular hypokinesis. Techincally difficult study to assess left ventricular function givenpatients tachyarrhythmia. The LV function appears moderately to severely depressed.     Right Ventricle:  The right ventricle is not well visualized. The right ventricular cavity is mildly enlarged in size, with normal wall thickness and probably normal systolic function.     Right Atrium:  The right atrium is normal in size with an indexed volume of 24.92 ml/m².     Interatrial Septum:  The interatrial septum appears intact.     Aortic Valve:  A mechanical valve replacement is present in the aortic position. There is mild intravalvular regurgitation.     Mitral Valve:  Mechanical valve is present in the mitral position. Prosthetic mitral valve leaflets are not well visualized. Transvalvular mitral gradients are normal.     Tricuspid Valve:  There is mild to moderate tricuspid regurgitation.     Pulmonic Valve:  Structurally normal pulmonic valve with normal leaflet excursion. There is mild pulmonic regurgitation.     Aorta:  The aortic root at the sinuses of Valsalva is normal in size. The aortic arch diameter is normal in size.

## 2024-02-22 NOTE — PROGRESS NOTE ADULT - CRITICAL CARE ATTENDING COMMENT
Patient is a 67 yo F w/ Afib (Digoxin/Metoprolol), mechanical MVR/AVR 2/2 to rheumatic disease (on coumadin), T2DM admitted to MICU for septic shock, Afib w/ RVR, euglycemic DKA. Course c/b difficult to control Afib w/ RVR and new severely reduced LVSF.    # HFrEF  # Septic Shock  # E coli UTI  # Lactic acidosis  # DKA  # Afib with RVR  # Transaminitis  # Diarrhea  # Mechanical MVR/AVR  - Complete PO Amio load  - c/w Metoprolol and Digoxin  - Monitor on Tele  - f/u cardiology recs re need for Amio after completion of load  - Trend LFTs, improving. RUQ w/ steatosis  - Repeat TTE now HR improved  - Daily INR, dose coumadin for INR 2.5 to 3.5  - TOV  - Will deescalate Zosyn to Ceftriaxone  - c/w Basal Bolus insulin, titrate as needed    Brad Alford MD  Pulmonary & Critical Care

## 2024-02-22 NOTE — CHART NOTE - NSCHARTNOTEFT_GEN_A_CORE
MICU Transfer Note    Transfer from MICU to  [id number]    Transfer to:  (X) Telemetry        Accepting Physician:  Signout given to:     HPI/MICU COURSE: 68 year old female with PMH Mitral regurgitation, mechanical aortic valve replacement, rheumatic heart disease, atrial fibrillation, HTN, on Digoxin, Warfarin, DM 2 on Jardiance presenting with flu like symptoms from urgent care. 2 weeks ago patient started having cough, congestion, and intermittent fevers which improved. On 02/12 worsening URI with white sputum production. On 02/17 noticed dysuria and polyuria. Fevers improve with Tylenol. Went to urgent care and found to be in Afib with RVR in the 180s and sent to the ED. In ED, Received Acetaminophen, 2L IV bolus, 3 neosticks, started on Phenylephrine drip, and Lopressor 5 mg. She was admitted to MICU for Urosepsis, septic shock requiring Phenylephrine gtt and Euglycemic DKA requiring insulin gtt. She was weaned off of Pressors and transitioned off her insulin gtts on 2/20. Her course was complicated by AFib w/ RVR and Septic cardiomyopathy w/ reduced EF. Her course was further complicated by lactic acidosis i/s/o ? metformin toxicity vs septic/cardiogenic shock so her beta blockers were initially held. She developed transaminitis, Hep Panel Neg, RUQ w/ cholelithiasis/mural thickening. Her metoprolol was resumed after her lactate cleared and she is tolerating it. She was also loaded w/ Amio per Cardiology and she is being continued on her home Digoxin. Her sepsis is improving on Zosyn and her Heart rates are improved as well. She Is hemodynamically stable and eligible to a general telemetry floor.         PAST MEDICAL & SURGICAL HISTORY:  T2DM (type 2 diabetes mellitus)  Chronic atrial fibrillation  H/O mitral valve replacement with mechanical valve  H/O mechanical aortic valve replacement        Vital Signs Last 24 Hrs  T(C): 35.4 (22 Feb 2024 00:00), Max: 37.4 (21 Feb 2024 16:00)  T(F): 95.7 (22 Feb 2024 00:00), Max: 99.4 (21 Feb 2024 16:00)  HR: 99 (22 Feb 2024 01:00) (86 - 152)  BP: 120/71 (22 Feb 2024 01:00) (113/90 - 160/102)  BP(mean): 87 (22 Feb 2024 01:00) (87 - 111)  RR: 33 (22 Feb 2024 01:00) (17 - 33)  SpO2: 100% (22 Feb 2024 01:00) (99% - 100%)    Parameters below as of 22 Feb 2024 01:00  Patient On (Oxygen Delivery Method): room air      I&O's Summary    20 Feb 2024 07:01  -  21 Feb 2024 07:00  --------------------------------------------------------  IN: 1792.1 mL / OUT: 1750 mL / NET: 42.1 mL    21 Feb 2024 07:01  -  22 Feb 2024 01:32  --------------------------------------------------------  IN: 183.7 mL / OUT: 1300 mL / NET: -1116.3 mL      Allergies    No Known Allergies    Intolerances      MEDICATIONS  (STANDING):  aMIOdarone    Tablet   Oral   aMIOdarone    Tablet 400 milliGRAM(s) Oral every 8 hours  aMIOdarone Infusion 1 mG/Min (33.3 mL/Hr) IV Continuous <Continuous>  dextrose 5%. 1000 milliLiter(s) (50 mL/Hr) IV Continuous <Continuous>  dextrose 5%. 1000 milliLiter(s) (100 mL/Hr) IV Continuous <Continuous>  dextrose 50% Injectable 25 Gram(s) IV Push once  dextrose 50% Injectable 12.5 Gram(s) IV Push once  dextrose 50% Injectable 25 Gram(s) IV Push once  digoxin     Tablet 125 MICROGram(s) Oral every other day  glucagon  Injectable 1 milliGRAM(s) IntraMuscular once  insulin glargine Injectable (LANTUS) 10 Unit(s) SubCutaneous <User Schedule>  insulin lispro (ADMELOG) corrective regimen sliding scale   SubCutaneous three times a day before meals  insulin lispro (ADMELOG) corrective regimen sliding scale   SubCutaneous at bedtime  insulin lispro Injectable (ADMELOG) 3 Unit(s) SubCutaneous before breakfast  insulin lispro Injectable (ADMELOG) 3 Unit(s) SubCutaneous before dinner  insulin lispro Injectable (ADMELOG) 3 Unit(s) SubCutaneous before lunch  metoprolol tartrate 50 milliGRAM(s) Oral every 8 hours  piperacillin/tazobactam IVPB.. 3.375 Gram(s) IV Intermittent every 8 hours    MEDICATIONS  (PRN):  dextrose Oral Gel 15 Gram(s) Oral once PRN Blood Glucose LESS THAN 70 milliGRAM(s)/deciliter  guaiFENesin Oral Liquid (Sugar-Free) 200 milliGRAM(s) Oral every 6 hours PRN Cough  sodium chloride 0.65% Nasal 1 Spray(s) Both Nostrils two times a day PRN Nasal Congestion                            11.8   15.00 )-----------( 230      ( 21 Feb 2024 16:36 )             36.4     02-21    133<L>  |  100  |  26<H>  ----------------------------<  359<H>  4.9   |  25  |  0.64    Ca    8.4      21 Feb 2024 16:36  Phos  2.7     02-21  Mg     2.20     02-21    TPro  5.4<L>  /  Alb  3.2<L>  /  TBili  0.6  /  DBili  x   /  AST  281<H>  /  ALT  507<H>  /  AlkPhos  187<H>  02-21    PT/INR - ( 21 Feb 2024 16:36 )   PT: 58.1 sec;   INR: 5.40 ratio         PTT - ( 21 Feb 2024 16:36 )  PTT:45.9 sec      ABG - ( 20 Feb 2024 18:10 )  pH, Arterial: 7.34  pH, Blood: x     /  pCO2: 30    /  pO2: 125   / HCO3: 16    / Base Excess: -8.3  /  SaO2: 98.7          Physical Exam:   CONSTITUTIONAL: NAD, sitting up in chair  EYES: PERRLA and symmetric, EOMI, No conjunctival or scleral injection, non-icteric  ENMT: Oral mucosa with moist membranes. Normal dentition; no pharyngeal injection or exudates  NECK: Supple, symmetric and without tracheal deviation   RESP: No respiratory distress, no use of accessory muscles; CTA b/l, no WRR  CV: Irregularly irregular, +S1S2, no MRG; no JVD; no peripheral edema, (+) tachycardia   GI: Soft, NT, ND, no rebound, no guarding; no palpable masses; no hepatosplenomegaly; no hernia palpated  LYMPH: No cervical LAD or tenderness; no axillary LAD or tenderness; no inguinal LAD or tenderness  SKIN: No rashes or ulcers noted; no subcutaneous nodules or induration palpable  NEURO: Alert, awake, follows commands  PSYCH: Appropriate insight/judgment; A+O x 3, mood and affect appropriate, recent/remote memory intact            ASSESSMENT & PLAN:       Assessment: 68 F PMH Mitral regurgitation, rheumatic heart disease, atrial fibrillation, HTN, on Digoxin, Warfarin, DM 2 on Jardiance presenting with flu like symptoms, tachycardia, and hypotension. She was admitted to MICU w/ Urosepsis c/b septic shock, A fib w/ RVR, and euglycemic DKA, now improved.       Neuro:  Alert and orientated x3 at baseline  No active issues  - currently at AOx3, following commands  - activity- oob to chair    Cardiovascular:   #Atrial fibrillation with RVR  #Mitral valvue regurgitation  #Mechanical aortic valve replacement  #History of rheumatic heart disease  #Shock, Septic vs cardiogenic  - required pressors likely iso shock, Josep gtt weaned off since 12am 2/20  - Remained Afib -170's w/o hypotension despite initiating and uptrending home PO lopressor   - s/p Lopressor IVP PRN- no change in HR/BP, however c/o "not feeling well" and diaphoretic soon after administered IVP lopressor likely 2/2 to decompensated heart failure? lopressor PO was held, however restarted today and increased to  50mg TID  - cards consult- f/u recs  - s/p Amio bolus (2/20)  continue Amiodarone gtt x 24 hours, then PO amio load  - TTE 2/20  LV systolic function is severely decreased with EF 37 % a change from previous echo 11/23   - Continue Warfarin, INR goal 2.5-3.5   - c/w digoxin every other day  - Digoxin by level, draw 2/23    #HTN  - Hold home Enalapril in the setting of hypotension    #HLD  - Atorvastatin 20 mg daily held iso elevated LFT's    Pulmonology  - CXR shows clear lungs  - Saturating well on room air, protecting airway  - POCUS 2/20 with b lines throughout b/l likely fluid overload   - s/p lasix 40mg IV       GI  RICHY  # Transaminitis   , , Alk phos and Bili normal   - unknown etiology  - hepatitis panel neg  - RUQ US 2/20 w/findings suggestive of steatosis, cholelithiasis, GB nonspecific mural thickening  - continue to trend    Diet: consistent carb  - tolerating diet  - bowel regimen  - bm 2/21- loose stools  - f/u GI pcr , c-fiff    Renal  #Anion gap metabolic acidosis  #Lactic acidosis  #Ketoacidosis  - Treat underlying cause, possible Euglycemic DKA vs Sepsis  - hyperlactatemia likely iso renal/liver dysfunction metformin vs urosepsis, continue to trend  - S/p 3L IVF   - Monitor VBGs q 12      #LUISA  - Cr from 11/2023 of 0.39  - Admission Cr of .89  - LUISA likely pre-renal in the setting of shock, now slightly downtrending  - S/P lasix 40mg IVP (2/21)   - diuretics PRN goal to keep net even- slightly negative?  - indwelling catheter placed 2/20   - Monitor UOP and Cr      ID  #Septic shock in the setting of URI vs UTI    - Complained of burning on urination on 2/15, No CVA tenderness   - leukocytosis 15, Tmax 100.6F  - UA positive, urine culture 2/19 grew E coli  - blood culture ngtd  - RVP negative  - s/p Azithromyin x1, Ceftriaxone 1g daily for 14 days (2/19-2/20) broadened to empiric zosyn (2/20-  )  iso increasing lactate  - consider PAN CT if lactate worsens        Endo  History of type DM2 on Jardiance, now presenting with infection, polyuria, polydipsia  Home regimen - jardiance and janumet  #Euglycemic DKA  #DM2  A1c 6.9%  ED: pH 7.27 AG 20, Bicarb 15, Glucose 201, Lactate 4  - s/p 4 L IVF in ED  - transitioned off insulin with 6 units Lantus (2/20)  - remains hyperglycemic, blood glucose 200-300's  - can start premeal insulin with admelog 3 units  - Increase lantus to 10 units daily  - continue low admelog correction scales before meals and bedtime  - FSG goal 140-180mg/dL while in MICU    Endocrine following recs:  Discharge plan:  -follow up with outpatient endocrinologist Dr. Ch  -stop jardiance on discharge given urosepsis and EDKA. can consider resuming in future as outpt  -Will need to trend lactate closer to discharge to see if metformin can be safely resumed ( had decreased to 1.4, then increased to 2.1), if so then may resume janumet 50-1000mg BID, may need additional agent as well depending on blood glucose values      Heme/Onc  - Continue Warfarin with INR goal of 2.5-3.5   - INR 5.4 today, hgb stable, continue to monitor  - daily coags    Ethics  Full Code  - Family at bedside, discussed plan        FOR FOLLOW UP:    - monitor HR  - Dig lvl 2/23  - INR 2.5-3.5  - Lasix prn, Goal even to negative   - Loose Stool, GI PCR pending  - Transaminitis, finish d/c Amio after PO Load completed per cardiology.   - Monitor POC Glu, Endo following.  - Will need repeat Echo when HR normalizes. MICU Transfer Note    Transfer from MICU to  [id number]    Transfer to:  (X) Telemetry        Accepting Physician:  Signout given to:     HPI/MICU COURSE: 68 year old female with PMH Mitral regurgitation, mechanical aortic valve replacement, rheumatic heart disease, atrial fibrillation, HTN, on Digoxin, Warfarin, DM 2 on Jardiance presenting with flu like symptoms from urgent care. 2 weeks ago patient started having cough, congestion, and intermittent fevers which improved. On 02/12 worsening URI with white sputum production. On 02/17 noticed dysuria and polyuria. Fevers improve with Tylenol. Went to urgent care and found to be in Afib with RVR in the 180s and sent to the ED. In ED, Received Acetaminophen, 2L IV bolus, 3 neosticks, started on Phenylephrine drip, and Lopressor 5 mg. She was admitted to MICU for Urosepsis, septic shock requiring Phenylephrine gtt and Euglycemic DKA requiring insulin gtt. She was weaned off of Pressors and transitioned off her insulin gtts on 2/20. Her course was complicated by AFib w/ RVR and Septic cardiomyopathy w/ reduced EF. Her course was further complicated by lactic acidosis i/s/o ? metformin toxicity vs septic/cardiogenic shock so her beta blockers were initially held. She developed transaminitis, Hep Panel Neg, RUQ w/ cholelithiasis/mural thickening. Her metoprolol was resumed after her lactate cleared and she is tolerating it. She was also loaded w/ Amio per Cardiology and she is being continued on her home Digoxin. Her sepsis is improving on Zosyn and her Heart rates are improved as well. She Is hemodynamically stable and eligible to a general telemetry floor.         PAST MEDICAL & SURGICAL HISTORY:  T2DM (type 2 diabetes mellitus)  Chronic atrial fibrillation  H/O mitral valve replacement with mechanical valve  H/O mechanical aortic valve replacement        Vital Signs Last 24 Hrs  T(C): 35.4 (22 Feb 2024 00:00), Max: 37.4 (21 Feb 2024 16:00)  T(F): 95.7 (22 Feb 2024 00:00), Max: 99.4 (21 Feb 2024 16:00)  HR: 99 (22 Feb 2024 01:00) (86 - 152)  BP: 120/71 (22 Feb 2024 01:00) (113/90 - 160/102)  BP(mean): 87 (22 Feb 2024 01:00) (87 - 111)  RR: 33 (22 Feb 2024 01:00) (17 - 33)  SpO2: 100% (22 Feb 2024 01:00) (99% - 100%)    Parameters below as of 22 Feb 2024 01:00  Patient On (Oxygen Delivery Method): room air      I&O's Summary    20 Feb 2024 07:01  -  21 Feb 2024 07:00  --------------------------------------------------------  IN: 1792.1 mL / OUT: 1750 mL / NET: 42.1 mL    21 Feb 2024 07:01  -  22 Feb 2024 01:32  --------------------------------------------------------  IN: 183.7 mL / OUT: 1300 mL / NET: -1116.3 mL      Allergies    No Known Allergies    Intolerances      MEDICATIONS  (STANDING):  aMIOdarone    Tablet   Oral   aMIOdarone    Tablet 400 milliGRAM(s) Oral every 8 hours  aMIOdarone Infusion 1 mG/Min (33.3 mL/Hr) IV Continuous <Continuous>  dextrose 5%. 1000 milliLiter(s) (50 mL/Hr) IV Continuous <Continuous>  dextrose 5%. 1000 milliLiter(s) (100 mL/Hr) IV Continuous <Continuous>  dextrose 50% Injectable 25 Gram(s) IV Push once  dextrose 50% Injectable 12.5 Gram(s) IV Push once  dextrose 50% Injectable 25 Gram(s) IV Push once  digoxin     Tablet 125 MICROGram(s) Oral every other day  glucagon  Injectable 1 milliGRAM(s) IntraMuscular once  insulin glargine Injectable (LANTUS) 10 Unit(s) SubCutaneous <User Schedule>  insulin lispro (ADMELOG) corrective regimen sliding scale   SubCutaneous three times a day before meals  insulin lispro (ADMELOG) corrective regimen sliding scale   SubCutaneous at bedtime  insulin lispro Injectable (ADMELOG) 3 Unit(s) SubCutaneous before breakfast  insulin lispro Injectable (ADMELOG) 3 Unit(s) SubCutaneous before dinner  insulin lispro Injectable (ADMELOG) 3 Unit(s) SubCutaneous before lunch  metoprolol tartrate 50 milliGRAM(s) Oral every 8 hours  piperacillin/tazobactam IVPB.. 3.375 Gram(s) IV Intermittent every 8 hours    MEDICATIONS  (PRN):  dextrose Oral Gel 15 Gram(s) Oral once PRN Blood Glucose LESS THAN 70 milliGRAM(s)/deciliter  guaiFENesin Oral Liquid (Sugar-Free) 200 milliGRAM(s) Oral every 6 hours PRN Cough  sodium chloride 0.65% Nasal 1 Spray(s) Both Nostrils two times a day PRN Nasal Congestion                            11.8   15.00 )-----------( 230      ( 21 Feb 2024 16:36 )             36.4     02-21    133<L>  |  100  |  26<H>  ----------------------------<  359<H>  4.9   |  25  |  0.64    Ca    8.4      21 Feb 2024 16:36  Phos  2.7     02-21  Mg     2.20     02-21    TPro  5.4<L>  /  Alb  3.2<L>  /  TBili  0.6  /  DBili  x   /  AST  281<H>  /  ALT  507<H>  /  AlkPhos  187<H>  02-21    PT/INR - ( 21 Feb 2024 16:36 )   PT: 58.1 sec;   INR: 5.40 ratio         PTT - ( 21 Feb 2024 16:36 )  PTT:45.9 sec      ABG - ( 20 Feb 2024 18:10 )  pH, Arterial: 7.34  pH, Blood: x     /  pCO2: 30    /  pO2: 125   / HCO3: 16    / Base Excess: -8.3  /  SaO2: 98.7          Physical Exam:   CONSTITUTIONAL: NAD, sitting up in chair  EYES: PERRLA and symmetric, EOMI, No conjunctival or scleral injection, non-icteric  ENMT: Oral mucosa with moist membranes. Normal dentition; no pharyngeal injection or exudates  NECK: Supple, symmetric and without tracheal deviation   RESP: No respiratory distress, no use of accessory muscles; CTA b/l, no WRR  CV: Irregularly irregular, +S1S2, no MRG; no JVD; no peripheral edema, (+) tachycardia   GI: Soft, NT, ND, no rebound, no guarding; no palpable masses; no hepatosplenomegaly; no hernia palpated  LYMPH: No cervical LAD or tenderness; no axillary LAD or tenderness; no inguinal LAD or tenderness  SKIN: No rashes or ulcers noted; no subcutaneous nodules or induration palpable  NEURO: Alert, awake, follows commands  PSYCH: Appropriate insight/judgment; A+O x 3, mood and affect appropriate, recent/remote memory intact            ASSESSMENT & PLAN:       Assessment: 68 F PMH Mitral regurgitation, rheumatic heart disease, atrial fibrillation, HTN, on Digoxin, Warfarin, DM 2 on Jardiance presenting with flu like symptoms, tachycardia, and hypotension. She was admitted to MICU w/ Urosepsis c/b septic shock, A fib w/ RVR, and euglycemic DKA, now improved.       Neuro:  Alert and orientated x3 at baseline  No active issues  - currently at AOx3, following commands  - activity- oob to chair    Cardiovascular:   #Atrial fibrillation with RVR  #Mitral valvue regurgitation  #Mechanical aortic valve replacement  #History of rheumatic heart disease  #Shock, Septic vs cardiogenic  - required pressors likely iso shock, Josep gtt weaned off since 12am 2/20  - Remained Afib -170's w/o hypotension despite initiating and uptrending home PO lopressor   - s/p Lopressor IVP PRN- no change in HR/BP, however c/o "not feeling well" and diaphoretic soon after administered IVP lopressor likely 2/2 to decompensated heart failure? lopressor PO was held, however restarted today and increased to  50mg TID  - cards consult- f/u recs  - s/p Amio bolus (2/20)  continue Amiodarone gtt x 24 hours, then PO amio load  - TTE 2/20  LV systolic function is severely decreased with EF 37 % a change from previous echo 11/23   - Continue Warfarin, INR goal 2.5-3.5   - c/w digoxin every other day  - Digoxin by level, draw 2/23    #HTN  - Hold home Enalapril in the setting of hypotension    #HLD  - Atorvastatin 20 mg daily held iso elevated LFT's    Pulmonology  - CXR shows clear lungs  - Saturating well on room air, protecting airway  - POCUS 2/20 with b lines throughout b/l likely fluid overload   - s/p lasix 40mg IV       GI  RICHY  # Transaminitis   , , Alk phos and Bili normal   - unknown etiology  - hepatitis panel neg  - RUQ US 2/20 w/findings suggestive of steatosis, cholelithiasis, GB nonspecific mural thickening  - continue to trend    Diet: consistent carb  - tolerating diet  - bowel regimen  - bm 2/21- loose stools  - f/u GI pcr , c-fiff    Renal  #Anion gap metabolic acidosis  #Lactic acidosis  #Ketoacidosis  - Treat underlying cause, possible Euglycemic DKA vs Sepsis  - hyperlactatemia likely iso renal/liver dysfunction metformin vs urosepsis, continue to trend  - S/p 3L IVF   - Monitor VBGs q 12      #LUISA  - Cr from 11/2023 of 0.39  - Admission Cr of .89  - LUISA likely pre-renal in the setting of shock, now slightly downtrending  - S/P lasix 40mg IVP (2/21)   - diuretics PRN goal to keep net even- slightly negative?  - indwelling catheter placed 2/20   - Monitor UOP and Cr      ID  #Septic shock in the setting of URI vs UTI    - Complained of burning on urination on 2/15, No CVA tenderness   - leukocytosis 15, Tmax 100.6F  - UA positive, urine culture 2/19 grew E coli  - blood culture ngtd  - RVP negative  - s/p Azithromyin x1, Ceftriaxone 1g daily for 14 days (2/19-2/20) broadened to empiric zosyn (2/20-  )  iso increasing lactate  - consider PAN CT if lactate worsens        Endo  History of type DM2 on Jardiance, now presenting with infection, polyuria, polydipsia  Home regimen - jardiance and janumet  #Euglycemic DKA  #DM2  A1c 6.9%  ED: pH 7.27 AG 20, Bicarb 15, Glucose 201, Lactate 4  - s/p 4 L IVF in ED  - transitioned off insulin with 6 units Lantus (2/20)  - remains hyperglycemic, blood glucose 200-300's  - can start premeal insulin with admelog 3 units  - Increase lantus to 10 units daily  - continue low admelog correction scales before meals and bedtime  - FSG goal 140-180mg/dL while in MICU    Endocrine following recs:  Discharge plan:  -follow up with outpatient endocrinologist Dr. Ch  -stop jardiance on discharge given urosepsis and EDKA. can consider resuming in future as outpt  -Will need to trend lactate closer to discharge to see if metformin can be safely resumed ( had decreased to 1.4, then increased to 2.1), if so then may resume janumet 50-1000mg BID, may need additional agent as well depending on blood glucose values      Heme/Onc  - Continue Warfarin with INR goal of 2.5-3.5   - INR 5.4 today, hgb stable, continue to monitor  - daily coags    Ethics  Full Code  - Family at bedside, discussed plan        FOR FOLLOW UP:    - monitor HR  - Dig lvl 2/23  - INR 2.5-3.5  - Lasix prn, Goal even to negative   - Loose Stool, GI PCR pending  - Transaminitis, monitor LFTs, finish d/c Amio after PO Load completed per cardiology.   - Monitor POC Glu, Endo following.  - Will need repeat Echo when HR normalizes. MICU Transfer Note    Transfer from MICU to     Transfer to:  (X) Telemetry        Accepting Physician: Dr Santizo  Signout given to: Dr Yogesh Mott    HPI/MICU COURSE: 68 year old female with PMH Mitral regurgitation, mechanical aortic valve replacement, rheumatic heart disease, atrial fibrillation, HTN, on Digoxin, Warfarin, DM 2 on Jardiance presenting with flu like symptoms from urgent care. 2 weeks ago patient started having cough, congestion, and intermittent fevers which improved. On 02/12 worsening URI with white sputum production. On 02/17 noticed dysuria and polyuria. Fevers improve with Tylenol. Went to urgent care and found to be in Afib with RVR in the 180s and sent to the ED. In ED, Received Acetaminophen, 2L IV bolus, 3 neosticks, started on Phenylephrine drip, and Lopressor 5 mg. She was admitted to MICU for Urosepsis, septic shock requiring Phenylephrine gtt and Euglycemic DKA requiring insulin gtt. She was weaned off of Pressors and transitioned off her insulin gtts on 2/20. Her course was complicated by AFib w/ RVR and Septic cardiomyopathy w/ reduced EF. Her course was further complicated by lactic acidosis i/s/o ? metformin toxicity vs septic/cardiogenic shock so her beta blockers were initially held. She developed transaminitis, Hep Panel Neg, RUQ w/ cholelithiasis/mural thickening. Her metoprolol was resumed after her lactate cleared and she is tolerating it. She was also loaded w/ Amio per Cardiology and she is being continued on her home Digoxin. Her sepsis is improving on Zosyn and her Heart rates are improved as well. She Is hemodynamically stable and eligible to a general telemetry floor.         PAST MEDICAL & SURGICAL HISTORY:  T2DM (type 2 diabetes mellitus)  Chronic atrial fibrillation  H/O mitral valve replacement with mechanical valve  H/O mechanical aortic valve replacement        Vital Signs Last 24 Hrs  T(C): 35.4 (22 Feb 2024 00:00), Max: 37.4 (21 Feb 2024 16:00)  T(F): 95.7 (22 Feb 2024 00:00), Max: 99.4 (21 Feb 2024 16:00)  HR: 99 (22 Feb 2024 01:00) (86 - 152)  BP: 120/71 (22 Feb 2024 01:00) (113/90 - 160/102)  BP(mean): 87 (22 Feb 2024 01:00) (87 - 111)  RR: 33 (22 Feb 2024 01:00) (17 - 33)  SpO2: 100% (22 Feb 2024 01:00) (99% - 100%)    Parameters below as of 22 Feb 2024 01:00  Patient On (Oxygen Delivery Method): room air      I&O's Summary    20 Feb 2024 07:01  -  21 Feb 2024 07:00  --------------------------------------------------------  IN: 1792.1 mL / OUT: 1750 mL / NET: 42.1 mL    21 Feb 2024 07:01  -  22 Feb 2024 01:32  --------------------------------------------------------  IN: 183.7 mL / OUT: 1300 mL / NET: -1116.3 mL      Allergies    No Known Allergies    Intolerances      MEDICATIONS  (STANDING):  aMIOdarone    Tablet   Oral   aMIOdarone    Tablet 400 milliGRAM(s) Oral every 8 hours  aMIOdarone Infusion 1 mG/Min (33.3 mL/Hr) IV Continuous <Continuous>  dextrose 5%. 1000 milliLiter(s) (50 mL/Hr) IV Continuous <Continuous>  dextrose 5%. 1000 milliLiter(s) (100 mL/Hr) IV Continuous <Continuous>  dextrose 50% Injectable 25 Gram(s) IV Push once  dextrose 50% Injectable 12.5 Gram(s) IV Push once  dextrose 50% Injectable 25 Gram(s) IV Push once  digoxin     Tablet 125 MICROGram(s) Oral every other day  glucagon  Injectable 1 milliGRAM(s) IntraMuscular once  insulin glargine Injectable (LANTUS) 10 Unit(s) SubCutaneous <User Schedule>  insulin lispro (ADMELOG) corrective regimen sliding scale   SubCutaneous three times a day before meals  insulin lispro (ADMELOG) corrective regimen sliding scale   SubCutaneous at bedtime  insulin lispro Injectable (ADMELOG) 3 Unit(s) SubCutaneous before breakfast  insulin lispro Injectable (ADMELOG) 3 Unit(s) SubCutaneous before dinner  insulin lispro Injectable (ADMELOG) 3 Unit(s) SubCutaneous before lunch  metoprolol tartrate 50 milliGRAM(s) Oral every 8 hours  piperacillin/tazobactam IVPB.. 3.375 Gram(s) IV Intermittent every 8 hours    MEDICATIONS  (PRN):  dextrose Oral Gel 15 Gram(s) Oral once PRN Blood Glucose LESS THAN 70 milliGRAM(s)/deciliter  guaiFENesin Oral Liquid (Sugar-Free) 200 milliGRAM(s) Oral every 6 hours PRN Cough  sodium chloride 0.65% Nasal 1 Spray(s) Both Nostrils two times a day PRN Nasal Congestion                            11.8   15.00 )-----------( 230      ( 21 Feb 2024 16:36 )             36.4     02-21    133<L>  |  100  |  26<H>  ----------------------------<  359<H>  4.9   |  25  |  0.64    Ca    8.4      21 Feb 2024 16:36  Phos  2.7     02-21  Mg     2.20     02-21    TPro  5.4<L>  /  Alb  3.2<L>  /  TBili  0.6  /  DBili  x   /  AST  281<H>  /  ALT  507<H>  /  AlkPhos  187<H>  02-21    PT/INR - ( 21 Feb 2024 16:36 )   PT: 58.1 sec;   INR: 5.40 ratio         PTT - ( 21 Feb 2024 16:36 )  PTT:45.9 sec      ABG - ( 20 Feb 2024 18:10 )  pH, Arterial: 7.34  pH, Blood: x     /  pCO2: 30    /  pO2: 125   / HCO3: 16    / Base Excess: -8.3  /  SaO2: 98.7          Physical Exam:   CONSTITUTIONAL: NAD, sitting up in chair  EYES: PERRLA and symmetric, EOMI, No conjunctival or scleral injection, non-icteric  ENMT: Oral mucosa with moist membranes. Normal dentition; no pharyngeal injection or exudates  NECK: Supple, symmetric and without tracheal deviation   RESP: No respiratory distress, no use of accessory muscles; CTA b/l, no WRR  CV: Irregularly irregular, +S1S2, no MRG; no JVD; no peripheral edema, (+) tachycardia   GI: Soft, NT, ND, no rebound, no guarding; no palpable masses; no hepatosplenomegaly; no hernia palpated  LYMPH: No cervical LAD or tenderness; no axillary LAD or tenderness; no inguinal LAD or tenderness  SKIN: No rashes or ulcers noted; no subcutaneous nodules or induration palpable  NEURO: Alert, awake, follows commands  PSYCH: Appropriate insight/judgment; A+O x 3, mood and affect appropriate, recent/remote memory intact            ASSESSMENT & PLAN:       Assessment: 68 F PMH Mitral regurgitation, rheumatic heart disease, atrial fibrillation, HTN, on Digoxin, Warfarin, DM 2 on Jardiance presenting with flu like symptoms, tachycardia, and hypotension. She was admitted to MICU w/ Urosepsis c/b septic shock, A fib w/ RVR, and euglycemic DKA, now improved.       Neuro:  Alert and orientated x3 at baseline  No active issues  - currently at AOx3, following commands  - activity- oob to chair    Cardiovascular:   #Atrial fibrillation with RVR  #Mitral valvue regurgitation  #Mechanical aortic valve replacement  #History of rheumatic heart disease  #Shock, Septic vs cardiogenic  - required pressors likely iso shock, Josep gtt weaned off since 12am 2/20  - Remained Afib -170's w/o hypotension despite initiating and uptrending home PO lopressor   - s/p Lopressor IVP PRN- no change in HR/BP, however c/o "not feeling well" and diaphoretic soon after administered IVP lopressor likely 2/2 to decompensated heart failure? lopressor PO was held, however restarted today and increased to  50mg TID  - cards consult- f/u recs  - s/p Amio bolus (2/20)  continue Amiodarone gtt x 24 hours, then PO amio load  - TTE 2/20  LV systolic function is severely decreased with EF 37 % a change from previous echo 11/23   - Continue Warfarin, INR goal 2.5-3.5   - c/w digoxin every other day  - Digoxin by level, draw 2/23    #HTN  - Hold home Enalapril in the setting of hypotension    #HLD  - Atorvastatin 20 mg daily held iso elevated LFT's    Pulmonology  - CXR shows clear lungs  - Saturating well on room air, protecting airway  - POCUS 2/20 with b lines throughout b/l likely fluid overload   - s/p lasix 40mg IV       GI  RICHY  # Transaminitis   , , Alk phos and Bili normal   - unknown etiology  - hepatitis panel neg  - RUQ US 2/20 w/findings suggestive of steatosis, cholelithiasis, GB nonspecific mural thickening  - continue to trend    Diet: consistent carb  - tolerating diet  - bowel regimen  - bm 2/21- loose stools  - f/u GI pcr , c-fiff    Renal  #Anion gap metabolic acidosis  #Lactic acidosis  #Ketoacidosis  - Treat underlying cause, possible Euglycemic DKA vs Sepsis  - hyperlactatemia likely iso renal/liver dysfunction metformin vs urosepsis, continue to trend  - S/p 3L IVF   - Monitor VBGs q 12      #LUISA  - Cr from 11/2023 of 0.39  - Admission Cr of .89  - LUISA likely pre-renal in the setting of shock, now slightly downtrending  - S/P lasix 40mg IVP (2/21)   - diuretics PRN goal to keep net even- slightly negative?  - indwelling catheter placed 2/20   - Monitor UOP and Cr      ID  #Septic shock in the setting of URI vs UTI    - Complained of burning on urination on 2/15, No CVA tenderness   - leukocytosis 15, Tmax 100.6F  - UA positive, urine culture 2/19 grew E coli  - blood culture ngtd  - RVP negative  - s/p Azithromyin x1, Ceftriaxone 1g daily for 14 days (2/19-2/20) broadened to empiric zosyn (2/20-  )  iso increasing lactate  - consider PAN CT if lactate worsens        Endo  History of type DM2 on Jardiance, now presenting with infection, polyuria, polydipsia  Home regimen - jardiance and janumet  #Euglycemic DKA  #DM2  A1c 6.9%  ED: pH 7.27 AG 20, Bicarb 15, Glucose 201, Lactate 4  - s/p 4 L IVF in ED  - transitioned off insulin with 6 units Lantus (2/20)  - remains hyperglycemic, blood glucose 200-300's  - can start premeal insulin with admelog 3 units  - Increase lantus to 10 units daily  - continue low admelog correction scales before meals and bedtime  - FSG goal 140-180mg/dL while in MICU    Endocrine following recs:  Discharge plan:  -follow up with outpatient endocrinologist Dr. Ch  -stop jarjose franciscoance on discharge given urosepsis and EDKA. can consider resuming in future as outpt  -Will need to trend lactate closer to discharge to see if metformin can be safely resumed ( had decreased to 1.4, then increased to 2.1), if so then may resume janumet 50-1000mg BID, may need additional agent as well depending on blood glucose values      Heme/Onc  - Continue Warfarin with INR goal of 2.5-3.5   - INR 5.4 today, hgb stable, continue to monitor  - daily coags    Ethics  Full Code  - Family at bedside, discussed plan        FOR FOLLOW UP:    - monitor HR  - Dig lvl 2/23  - INR 2.5-3.5  - Lasix prn, Goal even to negative   - Loose Stool, GI PCR pending  - Transaminitis, monitor LFTs, finish d/c Amio after PO Load completed per cardiology.   - Monitor POC Glu, Endo following.  - Will need repeat Echo when HR normalizes. MICU Transfer Note    Transfer from MICU to     Transfer to:  (X) Telemetry        Accepting Physician: Dr Santizo  Signout given to: Dr Yogesh Mott    HPI/MICU COURSE: 68 year old female with PMH Mitral regurgitation, mechanical aortic valve replacement, rheumatic heart disease, atrial fibrillation, HTN, on Digoxin, Warfarin, DM 2 on Jardiance presenting with flu like symptoms from urgent care. 2 weeks ago patient started having cough, congestion, and intermittent fevers which improved. On 02/12 worsening URI with white sputum production. On 02/17 noticed dysuria and polyuria. Fevers improve with Tylenol. Went to urgent care and found to be in Afib with RVR in the 180s and sent to the ED. In ED, Received Acetaminophen, 2L IV bolus, 3 neosticks, started on Phenylephrine drip, and Lopressor 5 mg. She was admitted to MICU for Urosepsis, septic shock requiring Phenylephrine gtt and Euglycemic DKA requiring insulin gtt. She was weaned off of Pressors and transitioned off her insulin gtts on 2/20. Her course was complicated by AFib w/ RVR and Septic cardiomyopathy w/ reduced EF. Her course was further complicated by lactic acidosis i/s/o ? metformin toxicity vs septic/cardiogenic shock so her beta blockers were initially held. She developed transaminitis, Hep Panel Neg, RUQ w/ cholelithiasis/mural thickening. Her metoprolol was resumed after her lactate cleared and she is tolerating it. She was also loaded w/ Amio per Cardiology and she is being continued on her home Digoxin. Her sepsis is improving on Zosyn and her Heart rates are improved as well. She Is hemodynamically stable and eligible to a general telemetry floor.         PAST MEDICAL & SURGICAL HISTORY:  T2DM (type 2 diabetes mellitus)  Chronic atrial fibrillation  H/O mitral valve replacement with mechanical valve  H/O mechanical aortic valve replacement        Vital Signs Last 24 Hrs  T(C): 35.4 (22 Feb 2024 00:00), Max: 37.4 (21 Feb 2024 16:00)  T(F): 95.7 (22 Feb 2024 00:00), Max: 99.4 (21 Feb 2024 16:00)  HR: 99 (22 Feb 2024 01:00) (86 - 152)  BP: 120/71 (22 Feb 2024 01:00) (113/90 - 160/102)  BP(mean): 87 (22 Feb 2024 01:00) (87 - 111)  RR: 33 (22 Feb 2024 01:00) (17 - 33)  SpO2: 100% (22 Feb 2024 01:00) (99% - 100%)    Parameters below as of 22 Feb 2024 01:00  Patient On (Oxygen Delivery Method): room air      I&O's Summary    20 Feb 2024 07:01  -  21 Feb 2024 07:00  --------------------------------------------------------  IN: 1792.1 mL / OUT: 1750 mL / NET: 42.1 mL    21 Feb 2024 07:01  -  22 Feb 2024 01:32  --------------------------------------------------------  IN: 183.7 mL / OUT: 1300 mL / NET: -1116.3 mL      Allergies    No Known Allergies    Intolerances      MEDICATIONS  (STANDING):  aMIOdarone    Tablet   Oral   aMIOdarone    Tablet 400 milliGRAM(s) Oral every 8 hours  aMIOdarone Infusion 1 mG/Min (33.3 mL/Hr) IV Continuous <Continuous>  dextrose 5%. 1000 milliLiter(s) (50 mL/Hr) IV Continuous <Continuous>  dextrose 5%. 1000 milliLiter(s) (100 mL/Hr) IV Continuous <Continuous>  dextrose 50% Injectable 25 Gram(s) IV Push once  dextrose 50% Injectable 12.5 Gram(s) IV Push once  dextrose 50% Injectable 25 Gram(s) IV Push once  digoxin     Tablet 125 MICROGram(s) Oral every other day  glucagon  Injectable 1 milliGRAM(s) IntraMuscular once  insulin glargine Injectable (LANTUS) 10 Unit(s) SubCutaneous <User Schedule>  insulin lispro (ADMELOG) corrective regimen sliding scale   SubCutaneous three times a day before meals  insulin lispro (ADMELOG) corrective regimen sliding scale   SubCutaneous at bedtime  insulin lispro Injectable (ADMELOG) 3 Unit(s) SubCutaneous before breakfast  insulin lispro Injectable (ADMELOG) 3 Unit(s) SubCutaneous before dinner  insulin lispro Injectable (ADMELOG) 3 Unit(s) SubCutaneous before lunch  metoprolol tartrate 50 milliGRAM(s) Oral every 8 hours  piperacillin/tazobactam IVPB.. 3.375 Gram(s) IV Intermittent every 8 hours    MEDICATIONS  (PRN):  dextrose Oral Gel 15 Gram(s) Oral once PRN Blood Glucose LESS THAN 70 milliGRAM(s)/deciliter  guaiFENesin Oral Liquid (Sugar-Free) 200 milliGRAM(s) Oral every 6 hours PRN Cough  sodium chloride 0.65% Nasal 1 Spray(s) Both Nostrils two times a day PRN Nasal Congestion                            11.8   15.00 )-----------( 230      ( 21 Feb 2024 16:36 )             36.4     02-21    133<L>  |  100  |  26<H>  ----------------------------<  359<H>  4.9   |  25  |  0.64    Ca    8.4      21 Feb 2024 16:36  Phos  2.7     02-21  Mg     2.20     02-21    TPro  5.4<L>  /  Alb  3.2<L>  /  TBili  0.6  /  DBili  x   /  AST  281<H>  /  ALT  507<H>  /  AlkPhos  187<H>  02-21    PT/INR - ( 21 Feb 2024 16:36 )   PT: 58.1 sec;   INR: 5.40 ratio         PTT - ( 21 Feb 2024 16:36 )  PTT:45.9 sec      ABG - ( 20 Feb 2024 18:10 )  pH, Arterial: 7.34  pH, Blood: x     /  pCO2: 30    /  pO2: 125   / HCO3: 16    / Base Excess: -8.3  /  SaO2: 98.7          Physical Exam:   CONSTITUTIONAL: NAD, sitting up in chair  EYES: PERRLA and symmetric, EOMI, No conjunctival or scleral injection, non-icteric  ENMT: Oral mucosa with moist membranes. Normal dentition; no pharyngeal injection or exudates  NECK: Supple, symmetric and without tracheal deviation   RESP: No respiratory distress, no use of accessory muscles; CTA b/l, no WRR  CV: Irregularly irregular, +S1S2, no MRG; no JVD; no peripheral edema, (+) tachycardia   GI: Soft, NT, ND, no rebound, no guarding; no palpable masses; no hepatosplenomegaly; no hernia palpated  LYMPH: No cervical LAD or tenderness; no axillary LAD or tenderness; no inguinal LAD or tenderness  SKIN: No rashes or ulcers noted; no subcutaneous nodules or induration palpable  NEURO: Alert, awake, follows commands  PSYCH: Appropriate insight/judgment; A+O x 3, mood and affect appropriate, recent/remote memory intact            ASSESSMENT & PLAN:       Assessment: 68 F PMH Mitral regurgitation, rheumatic heart disease, atrial fibrillation, HTN, on Digoxin, Warfarin, DM 2 on Jardiance presenting with flu like symptoms, tachycardia, and hypotension. She was admitted to MICU w/ Urosepsis c/b septic shock, A fib w/ RVR, and euglycemic DKA, now improved.       Neuro:  Alert and orientated x3 at baseline  No active issues  - currently at AOx3, following commands  - activity- oob to chair    Cardiovascular:   #Atrial fibrillation with RVR  #Mitral valvue regurgitation  #Mechanical aortic valve replacement  #History of rheumatic heart disease  #Shock, Septic vs cardiogenic  - required pressors likely iso shock, Josep gtt weaned off since 12am 2/20  - Remained Afib -170's w/o hypotension despite initiating and uptrending home PO lopressor   - s/p Lopressor IVP PRN- no change in HR/BP, however c/o "not feeling well" and diaphoretic soon after administered IVP lopressor likely 2/2 to decompensated heart failure? lopressor PO was held, however restarted today and increased to  50mg TID  - cards consult- f/u recs  - s/p Amio bolus (2/20)  continue Amiodarone gtt x 24 hours, then PO amio load  - TTE 2/20  LV systolic function is severely decreased with EF 37 % a change from previous echo 11/23   - Continue Warfarin, INR goal 2.5-3.5   - c/w digoxin every other day  - Digoxin by level, draw 2/23    #HTN  - Hold home Enalapril in the setting of hypotension    #HLD  - Atorvastatin 20 mg daily held iso elevated LFT's    Pulmonology  - CXR shows clear lungs  - Saturating well on room air, protecting airway  - POCUS 2/20 with b lines throughout b/l likely fluid overload   - s/p lasix 40mg IV       GI  RICHY  # Transaminitis   , , Alk phos and Bili normal   - unknown etiology  - hepatitis panel neg  - RUQ US 2/20 w/findings suggestive of steatosis, cholelithiasis, GB nonspecific mural thickening  - continue to trend    Diet: consistent carb  - tolerating diet  - bowel regimen  - bm 2/21- loose stools  - f/u GI pcr , c-fiff    Renal  #Anion gap metabolic acidosis  #Lactic acidosis  #Ketoacidosis  - Treat underlying cause, possible Euglycemic DKA vs Sepsis  - hyperlactatemia likely iso renal/liver dysfunction metformin vs urosepsis, continue to trend  - S/p 3L IVF   - Monitor VBGs q 12      #LUISA  - Cr from 11/2023 of 0.39  - Admission Cr of .89  - LUISA likely pre-renal in the setting of shock, now slightly downtrending  - S/P lasix 40mg IVP (2/21)   - diuretics PRN goal to keep net even- slightly negative?  - indwelling catheter placed 2/20   - Monitor UOP and Cr      ID  #Septic shock in the setting of URI vs UTI    - Complained of burning on urination on 2/15, No CVA tenderness   - leukocytosis 15, Tmax 100.6F  - UA positive, urine culture 2/19 grew E coli  - blood culture ngtd  - RVP negative  - s/p Azithromyin x1, Ceftriaxone 1g daily for 14 days (2/19-2/20) broadened to empiric zosyn (2/20-  )  iso increasing lactate  - consider PAN CT if lactate worsens        Endo  History of type DM2 on Jardiance, now presenting with infection, polyuria, polydipsia  Home regimen - jardiance and janumet  #Euglycemic DKA  #DM2  A1c 6.9%  ED: pH 7.27 AG 20, Bicarb 15, Glucose 201, Lactate 4  - s/p 4 L IVF in ED  - transitioned off insulin with 6 units Lantus (2/20)  - remains hyperglycemic, blood glucose 200-300's  - can start premeal insulin with admelog 3 units  - Increase lantus to 10 units daily  - continue low admelog correction scales before meals and bedtime  - FSG goal 140-180mg/dL while in MICU    Endocrine following recs:  Discharge plan:  -follow up with outpatient endocrinologist Dr. Ch  -stop jarjose franciscoance on discharge given urosepsis and EDKA. can consider resuming in future as outpt  -Will need to trend lactate closer to discharge to see if metformin can be safely resumed ( had decreased to 1.4, then increased to 2.1), if so then may resume janumet 50-1000mg BID, may need additional agent as well depending on blood glucose values      Heme/Onc  - Continue Warfarin with INR goal of 2.5-3.5   - INR 5.4 today, hgb stable, continue to monitor  - daily coags    Ethics  Full Code  - Family at bedside, discussed plan        FOR FOLLOW UP:    - monitor HR  - Dig lvl 2/23  - INR 2.5-3.5  - Lasix prn, Goal even to negative   - Loose Stool, GI PCR pending  - Transaminitis, monitor LFTs, finish d/c Amio after PO Load completed per cardiology.   - Monitor POC Glu, Endo following.  - Will need repeat Echo when HR normalizes.  - Consider TOV 2/22 MICU Transfer Note    Transfer from MICU to     Transfer to:  (X) Telemetry        Accepting Physician: Dr Santizo  Signout given to: Dr. Samuel Stein/VIVIAN Neumann/PHILIPPE       MICU COURSE: 68 year old female with PMH Mitral regurgitation, mechanical aortic valve replacement, rheumatic heart disease, atrial fibrillation, HTN, on Digoxin, Warfarin, DM 2 on Jardiance presenting with flu like symptoms from urgent care. 2 weeks ago patient started having cough, congestion, and intermittent fevers which improved. On 02/12 worsening URI with white sputum production. On 02/17 noticed dysuria and polyuria. Fevers improve with Tylenol. Went to urgent care and found to be in Afib with RVR in the 180s and sent to the ED. In ED, Received Acetaminophen, 2L IV bolus, 3 neosticks, started on Phenylephrine drip, and Lopressor 5 mg. She was admitted to MICU for Urosepsis, septic shock requiring Phenylephrine gtt and Euglycemic DKA requiring insulin gtt. She was weaned off of Pressors and transitioned off her insulin gtts on 2/20. Her course was complicated by AFib w/ RVR and Septic cardiomyopathy w/ reduced EF. Her course was further complicated by lactic acidosis i/s/o ? metformin toxicity vs septic/cardiogenic shock so her beta blockers were initially held. She developed transaminitis, Hep Panel Neg, RUQ w/ cholelithiasis/mural thickening. Her metoprolol was resumed after her lactate cleared and she is tolerating it. She was also loaded w/ Amiodarone IV and to continue PO amiodarone but as per Cardiology they wanted to stop amiodarone and continue with Toprol XLHer Heart rates are improved as well. She Is hemodynamically stable and eligible to a general telemetry floor.         68 F PMH Mitral regurgitation, rheumatic heart disease, atrial fibrillation, HTN, on Digoxin, Warfarin, DM 2 on Jardiance presenting with flu like symptoms, tachycardia, and hypotension. She was admitted to MICU w/ Urosepsis c/b septic shock, A fib w/ RVR, and euglycemic DKA, now improved.       Neuro:  Alert and orientated x3 at baseline  No active issues  - currently at AOx3, following commands  - oob to chair without any issues     Pulmonology  - CXR shows clear lungs  - Saturating well on room air, protecting airway  - POCUS 2/20 with b lines throughout b/l likely fluid overload   - s/p lasix 40mg IV       Cardiovascular:   #Atrial fibrillation with RVR  #Mitral valvue regurgitation  #Mechanical aortic valve replacement  #History of rheumatic heart disease  #Shock, Septic vs cardiogenic  - required pressors likely iso shock, Josep gtt weaned off since 12am on 2/20  - cardiology following   - s/p Amio bolus (2/20)  continue Amiodarone gtt x 24 hours, then PO amio load but will D/C as per cardiology   - TTE 2/20  LV systolic function is severely decreased with EF 37 % a change from previous echo 11/23 will repeat ECHO today 2/22  - Continue Warfarin, INR goal 2.5-3.5 todays INR 4.63 will hold off dose today   - c/w digoxin every other day and toprol XL  - Digoxin by level, draw 2/23  -patient endorsed that she feels flushed and does not like lopressor IVP     #HTN  - Holding home Enalapril in the setting of hypotension for now     #HLD  - Atorvastatin 20 mg daily held iso elevated LFT's      GI  RICHY  # Transaminitis   - unknown etiology  - hepatitis panel neg  - RUQ US 2/20 w/findings suggestive of steatosis, cholelithiasis, GB nonspecific mural thickening  - continue to trend     Diet: consistent carb  - tolerating diet  - bowel regimen  -  2/21- loose stools  -c. diff negative     Renal  #Anion gap metabolic acidosis  #Lactic acidosis  #Ketoacidosis  -2/2 Euglycemic DKA vs Sepsis   - hyperlactatemia likely iso renal/liver dysfunction metformin vs urosepsis, continue to trend      #LUISA  - Cr from 11/2023 of 0.39  - Admission Cr of .89  - LUISA likely pre-renal in the setting of shock, now slightly downtrending  - S/P lasix 40mg IVP (2/21)   - diuretics PRN goal to keep net even  - indwelling catheter placed 2/20 was making good UO will do TOV       ID  #Septic shock in the setting of URI vs UTI    - Complained of burning on urination on 2/15, No CVA tenderness   - UA positive, urine culture 2/19 grew E coli  - blood culture ngtd 2/20  - RVP negative  - s/p Azithromyin x1, Ceftriaxone 1g daily (2/19-2/20) broadened to empiric zosyn (2/20-2/22)  iso increasing lactate  -will change back to ceftriaxone (2/22-   )   -ID following       Endo  History of type DM2 on Jardiance, now presenting with infection, polyuria, polydipsia  Home regimen - jardiance and janumet  #Euglycemic DKA  #DM2  A1c 6.9%  ED: pH 7.27 AG 20, Bicarb 15, Glucose 201, Lactate 4  - s/p 4 L IVF in ED  - transitioned off insulin (2/20)  - can start premeal insulin with admelog 3 units  - Increase lantus to 12 units daily  - continue low admelog correction scales before meals and bedtime    Endocrine following recs:  Discharge plan:  -follow up with outpatient endocrinologist Dr. Ch  -stop jardiance on discharge given urosepsis and EDKA. can consider resuming in future as outpt  -Will need to trend lactate closer to discharge to see if metformin can be safely resumed ( had decreased to 1.4, then increased to 2.1), if so then may resume janumet 50-1000mg BID, may need additional agent as well depending on blood glucose values      Heme/Onc  - Continue Warfarin with INR goal of 2.5-3.5   - INR 4.63 today, hgb stable, continue to monitor  - daily coags    Ethics  Full Code  - Family at bedside and updated on care           FOR FOLLOW UP:    - monitor HR  -check digoxin level 2/23  - INR goal 2.5-3.5 did not give dose today   - Lasix prn, Goal even to negative   - Monitor POC Glu, Endo following.  - f/u repeat ECHO  - TOV 2/22 watch for urinary retention            PAST MEDICAL & SURGICAL HISTORY:  T2DM (type 2 diabetes mellitus)  Chronic atrial fibrillation  H/O mitral valve replacement with mechanical valve  H/O mechanical aortic valve replacement        Vital Signs Last 24 Hrs  T(C): 35.4 (22 Feb 2024 00:00), Max: 37.4 (21 Feb 2024 16:00)  T(F): 95.7 (22 Feb 2024 00:00), Max: 99.4 (21 Feb 2024 16:00)  HR: 99 (22 Feb 2024 01:00) (86 - 152)  BP: 120/71 (22 Feb 2024 01:00) (113/90 - 160/102)  BP(mean): 87 (22 Feb 2024 01:00) (87 - 111)  RR: 33 (22 Feb 2024 01:00) (17 - 33)  SpO2: 100% (22 Feb 2024 01:00) (99% - 100%)    Parameters below as of 22 Feb 2024 01:00  Patient On (Oxygen Delivery Method): room air      I&O's Summary    20 Feb 2024 07:01  -  21 Feb 2024 07:00  --------------------------------------------------------  IN: 1792.1 mL / OUT: 1750 mL / NET: 42.1 mL    21 Feb 2024 07:01  -  22 Feb 2024 01:32  --------------------------------------------------------  IN: 183.7 mL / OUT: 1300 mL / NET: -1116.3 mL      Allergies    No Known Allergies    Intolerances      MEDICATIONS  (STANDING):  aMIOdarone    Tablet   Oral   aMIOdarone    Tablet 400 milliGRAM(s) Oral every 8 hours  aMIOdarone Infusion 1 mG/Min (33.3 mL/Hr) IV Continuous <Continuous>  dextrose 5%. 1000 milliLiter(s) (50 mL/Hr) IV Continuous <Continuous>  dextrose 5%. 1000 milliLiter(s) (100 mL/Hr) IV Continuous <Continuous>  dextrose 50% Injectable 25 Gram(s) IV Push once  dextrose 50% Injectable 12.5 Gram(s) IV Push once  dextrose 50% Injectable 25 Gram(s) IV Push once  digoxin     Tablet 125 MICROGram(s) Oral every other day  glucagon  Injectable 1 milliGRAM(s) IntraMuscular once  insulin glargine Injectable (LANTUS) 10 Unit(s) SubCutaneous <User Schedule>  insulin lispro (ADMELOG) corrective regimen sliding scale   SubCutaneous three times a day before meals  insulin lispro (ADMELOG) corrective regimen sliding scale   SubCutaneous at bedtime  insulin lispro Injectable (ADMELOG) 3 Unit(s) SubCutaneous before breakfast  insulin lispro Injectable (ADMELOG) 3 Unit(s) SubCutaneous before dinner  insulin lispro Injectable (ADMELOG) 3 Unit(s) SubCutaneous before lunch  metoprolol tartrate 50 milliGRAM(s) Oral every 8 hours  piperacillin/tazobactam IVPB.. 3.375 Gram(s) IV Intermittent every 8 hours    MEDICATIONS  (PRN):  dextrose Oral Gel 15 Gram(s) Oral once PRN Blood Glucose LESS THAN 70 milliGRAM(s)/deciliter  guaiFENesin Oral Liquid (Sugar-Free) 200 milliGRAM(s) Oral every 6 hours PRN Cough  sodium chloride 0.65% Nasal 1 Spray(s) Both Nostrils two times a day PRN Nasal Congestion                            11.8   15.00 )-----------( 230      ( 21 Feb 2024 16:36 )             36.4     02-21    133<L>  |  100  |  26<H>  ----------------------------<  359<H>  4.9   |  25  |  0.64    Ca    8.4      21 Feb 2024 16:36  Phos  2.7     02-21  Mg     2.20     02-21    TPro  5.4<L>  /  Alb  3.2<L>  /  TBili  0.6  /  DBili  x   /  AST  281<H>  /  ALT  507<H>  /  AlkPhos  187<H>  02-21 MICU Transfer Note    Transfer from MICU to     Transfer to:  (X) Telemetry        Accepting Physician: Dr Santizo  Signout given to: Dr. Samuel Stein/VIVIAN Neumann/PHILIPPE Pritchard      MICU COURSE: 68 year old female with PMH Mitral regurgitation, mechanical aortic valve replacement, rheumatic heart disease, atrial fibrillation, HTN, on Digoxin, Warfarin, DM 2 on Jardiance presenting with flu like symptoms from urgent care. 2 weeks ago patient started having cough, congestion, and intermittent fevers which improved. On 02/12 worsening URI with white sputum production. On 02/17 noticed dysuria and polyuria. Fevers improve with Tylenol. Went to urgent care and found to be in Afib with RVR in the 180s and sent to the ED. In ED, Received Acetaminophen, 2L IV bolus, 3 neosticks, started on Phenylephrine drip, and Lopressor 5 mg. She was admitted to MICU for Urosepsis, septic shock requiring Phenylephrine gtt and Euglycemic DKA requiring insulin gtt. She was weaned off of Pressors and transitioned off her insulin gtts on 2/20. Her course was complicated by AFib w/ RVR and Septic cardiomyopathy w/ reduced EF. Her course was further complicated by lactic acidosis i/s/o ? metformin toxicity vs septic/cardiogenic shock so her beta blockers were initially held. She developed transaminitis, Hep Panel Neg, RUQ w/ cholelithiasis/mural thickening. Her metoprolol was resumed after her lactate cleared and she is tolerating it. She was also loaded w/ Amiodarone IV and to continue PO amiodarone but as per Cardiology they wanted to stop amiodarone and continue with Toprol XL. Her Heart rates are improved as well. She Is hemodynamically stable and eligible to a general telemetry floor.         68 F PMH Mitral regurgitation, rheumatic heart disease, atrial fibrillation, HTN, on Digoxin, Warfarin, DM 2 on Jardiance presenting with flu like symptoms, tachycardia, and hypotension. She was admitted to MICU w/ Urosepsis c/b septic shock, A fib w/ RVR, and euglycemic DKA, now improved.       Neuro:  Alert and orientated x3 at baseline  No active issues  - currently at AOx3, following commands  - oob to chair without any issues     Pulmonology  - CXR shows clear lungs  - Saturating well on room air, protecting airway  - POCUS 2/20 with b lines throughout b/l likely fluid overload   - s/p lasix 40mg IV       Cardiovascular:   #Atrial fibrillation with RVR  #Mitral valvue regurgitation  #Mechanical aortic valve replacement  #History of rheumatic heart disease  #Shock, Septic vs cardiogenic  - required pressors likely iso shock, Josep gtt weaned off since 12am on 2/20  - cardiology following   - s/p Amio bolus (2/20)  continue Amiodarone gtt x 24 hours, then PO amio load but will D/C as per cardiology   - TTE 2/20  LV systolic function is severely decreased with EF 37 % a change from previous echo 11/23 will repeat ECHO today 2/22  - Continue Warfarin, INR goal 2.5-3.5 todays INR 4.63 will hold off dose today   - c/w digoxin every other day and toprol XL  - Digoxin by level, draw 2/23  -patient endorsed that she feels flushed and does not like lopressor IVP     #HTN  - Holding home Enalapril in the setting of hypotension for now     #HLD  - Atorvastatin 20 mg daily held iso elevated LFT's      GI  RICHY  # Transaminitis   - unknown etiology  - hepatitis panel neg  - RUQ US 2/20 w/findings suggestive of steatosis, cholelithiasis, GB nonspecific mural thickening  - continue to trend     Diet: consistent carb  - tolerating diet  - bowel regimen  -  2/21- loose stools  -c. diff negative     Renal  #Anion gap metabolic acidosis  #Lactic acidosis  #Ketoacidosis  -2/2 Euglycemic DKA vs Sepsis   - hyperlactatemia likely iso renal/liver dysfunction metformin vs urosepsis, continue to trend      #LUISA  - Cr from 11/2023 of 0.39  - Admission Cr of .89  - LUISA likely pre-renal in the setting of shock, now slightly downtrending  - S/P lasix 40mg IVP (2/21)   - diuretics PRN goal to keep net even  - indwelling catheter placed 2/20 was making good UO will do TOV       ID  #Septic shock in the setting of URI vs UTI    - Complained of burning on urination on 2/15, No CVA tenderness   - UA positive, urine culture 2/19 grew E coli  - blood culture ngtd 2/20  - RVP negative  - s/p Azithromyin x1, Ceftriaxone 1g daily (2/19-2/20) broadened to empiric zosyn (2/20-2/22)  iso increasing lactate  -will change back to ceftriaxone (2/22-   )   -ID following       Endo  History of type DM2 on Jardiance, now presenting with infection, polyuria, polydipsia  Home regimen - jardiance and janumet  #Euglycemic DKA  #DM2  A1c 6.9%  ED: pH 7.27 AG 20, Bicarb 15, Glucose 201, Lactate 4  - s/p 4 L IVF in ED  - transitioned off insulin (2/20)  - can start premeal insulin with admelog 3 units  - Increase lantus to 12 units daily  - continue low admelog correction scales before meals and bedtime    Endocrine following recs:  Discharge plan:  -follow up with outpatient endocrinologist Dr. Ch  -stop jardiance on discharge given urosepsis and EDKA. can consider resuming in future as outpt  -Will need to trend lactate closer to discharge to see if metformin can be safely resumed ( had decreased to 1.4, then increased to 2.1), if so then may resume janumet 50-1000mg BID, may need additional agent as well depending on blood glucose values      Heme/Onc  - Continue Warfarin with INR goal of 2.5-3.5   - INR 4.63 today, hgb stable, continue to monitor  - daily coags    Ethics  Full Code  - Family at bedside and updated on care           FOR FOLLOW UP:    - monitor HR  -check digoxin level 2/23  - INR goal 2.5-3.5 did not give dose today   - Lasix prn, Goal even to negative   - Monitor POC Glu, Endo following.  - f/u repeat ECHO  - TOV 2/22 watch for urinary retention            PAST MEDICAL & SURGICAL HISTORY:  T2DM (type 2 diabetes mellitus)  Chronic atrial fibrillation  H/O mitral valve replacement with mechanical valve  H/O mechanical aortic valve replacement        Vital Signs Last 24 Hrs  T(C): 35.4 (22 Feb 2024 00:00), Max: 37.4 (21 Feb 2024 16:00)  T(F): 95.7 (22 Feb 2024 00:00), Max: 99.4 (21 Feb 2024 16:00)  HR: 99 (22 Feb 2024 01:00) (86 - 152)  BP: 120/71 (22 Feb 2024 01:00) (113/90 - 160/102)  BP(mean): 87 (22 Feb 2024 01:00) (87 - 111)  RR: 33 (22 Feb 2024 01:00) (17 - 33)  SpO2: 100% (22 Feb 2024 01:00) (99% - 100%)    Parameters below as of 22 Feb 2024 01:00  Patient On (Oxygen Delivery Method): room air      I&O's Summary    20 Feb 2024 07:01  -  21 Feb 2024 07:00  --------------------------------------------------------  IN: 1792.1 mL / OUT: 1750 mL / NET: 42.1 mL    21 Feb 2024 07:01  -  22 Feb 2024 01:32  --------------------------------------------------------  IN: 183.7 mL / OUT: 1300 mL / NET: -1116.3 mL      Allergies    No Known Allergies    Intolerances      MEDICATIONS  (STANDING):  aMIOdarone    Tablet   Oral   aMIOdarone    Tablet 400 milliGRAM(s) Oral every 8 hours  aMIOdarone Infusion 1 mG/Min (33.3 mL/Hr) IV Continuous <Continuous>  dextrose 5%. 1000 milliLiter(s) (50 mL/Hr) IV Continuous <Continuous>  dextrose 5%. 1000 milliLiter(s) (100 mL/Hr) IV Continuous <Continuous>  dextrose 50% Injectable 25 Gram(s) IV Push once  dextrose 50% Injectable 12.5 Gram(s) IV Push once  dextrose 50% Injectable 25 Gram(s) IV Push once  digoxin     Tablet 125 MICROGram(s) Oral every other day  glucagon  Injectable 1 milliGRAM(s) IntraMuscular once  insulin glargine Injectable (LANTUS) 10 Unit(s) SubCutaneous <User Schedule>  insulin lispro (ADMELOG) corrective regimen sliding scale   SubCutaneous three times a day before meals  insulin lispro (ADMELOG) corrective regimen sliding scale   SubCutaneous at bedtime  insulin lispro Injectable (ADMELOG) 3 Unit(s) SubCutaneous before breakfast  insulin lispro Injectable (ADMELOG) 3 Unit(s) SubCutaneous before dinner  insulin lispro Injectable (ADMELOG) 3 Unit(s) SubCutaneous before lunch  metoprolol tartrate 50 milliGRAM(s) Oral every 8 hours  piperacillin/tazobactam IVPB.. 3.375 Gram(s) IV Intermittent every 8 hours    MEDICATIONS  (PRN):  dextrose Oral Gel 15 Gram(s) Oral once PRN Blood Glucose LESS THAN 70 milliGRAM(s)/deciliter  guaiFENesin Oral Liquid (Sugar-Free) 200 milliGRAM(s) Oral every 6 hours PRN Cough  sodium chloride 0.65% Nasal 1 Spray(s) Both Nostrils two times a day PRN Nasal Congestion                            11.8   15.00 )-----------( 230      ( 21 Feb 2024 16:36 )             36.4     02-21    133<L>  |  100  |  26<H>  ----------------------------<  359<H>  4.9   |  25  |  0.64    Ca    8.4      21 Feb 2024 16:36  Phos  2.7     02-21  Mg     2.20     02-21    TPro  5.4<L>  /  Alb  3.2<L>  /  TBili  0.6  /  DBili  x   /  AST  281<H>  /  ALT  507<H>  /  AlkPhos  187<H>  02-21

## 2024-02-22 NOTE — PROGRESS NOTE ADULT - SUBJECTIVE AND OBJECTIVE BOX
No acute pain  No dizziness, palpitations  No SOB   No N/V   and son @ bedside  Spoke w/daughter over phone    Vital Signs Last 24 Hrs  T(C): 36.4 (22 Feb 2024 12:00), Max: 37.4 (21 Feb 2024 16:00)  T(F): 97.5 (22 Feb 2024 12:00), Max: 99.4 (21 Feb 2024 16:00)  HR: 126 (22 Feb 2024 12:00) (86 - 150)  BP: 137/115 (22 Feb 2024 12:00) (106/59 - 144/89)  BP(mean): 123 (22 Feb 2024 12:00) (74 - 123)  RR: 24 (22 Feb 2024 12:00) (18 - 35)  SpO2: 96% (22 Feb 2024 12:00) (96% - 100%)    I&O's Summary    02-21-24 @ 07:01  -  02-22-24 @ 07:00  --------------------------------------------------------  IN: 283.7 mL / OUT: 1895 mL / NET: -1611.3 mL    02-22-24 @ 07:01  -  02-22-24 @ 12:37  --------------------------------------------------------  IN: 0 mL / OUT: 300 mL / NET: -300 mL    GENERAL: NAD  HEAD:  Atraumatic, Normocephalic  EYES: EOMI, PERRLA, conjunctiva and sclera clear  NECK: Supple, No JVD  CHEST/LUNG: Clear to auscultation bilaterally; Normal respiratory effort w/o intercostal retractions  HEART: Irregular rhythm, tachycardic; nl S1/S2; No murmurs, rubs, or gallops  ABDOMEN: Soft, Nontender, Nondistended; Bowel sounds present; No hepatosplenomegaly  EXTREMITIES:  2+ Peripheral Pulses; No clubbing, cyanosis, or edema b/l; Nl ROM  NEURO: A+O x 3; nonfocal CN/motor/sensory/reflexes; speech + comprehension are intact  PSYCH: Nl affect; no delirium or agitation; no suicidal/homicidal ideation    LABS:                        12.2   12.71 )-----------( 246      ( 22 Feb 2024 00:21 )             37.9     02-22    135  |  101  |  19  ----------------------------<  199<H>  4.3   |  24  |  0.48<L>    Ca    8.2<L>      22 Feb 2024 00:21  Phos  1.4     02-22  Mg     1.80     02-22    TPro  5.9<L>  /  Alb  3.1<L>  /  TBili  0.5  /  DBili  x   /  AST  188<H>  /  ALT  445<H>  /  AlkPhos  160<H>  02-22    PT/INR - ( 22 Feb 2024 00:21 )   PT: 50.0 sec;   INR: 4.63 ratio         PTT - ( 22 Feb 2024 00:21 )  PTT:46.5 sec  CAPILLARY BLOOD GLUCOSE      POCT Blood Glucose.: 194 mg/dL (22 Feb 2024 11:43)  POCT Blood Glucose.: 175 mg/dL (22 Feb 2024 09:01)  POCT Blood Glucose.: 215 mg/dL (21 Feb 2024 21:40)  POCT Blood Glucose.: 305 mg/dL (21 Feb 2024 16:59)        Urinalysis Basic - ( 22 Feb 2024 00:21 )    Color: x / Appearance: x / SG: x / pH: x  Gluc: 199 mg/dL / Ketone: x  / Bili: x / Urobili: x   Blood: x / Protein: x / Nitrite: x   Leuk Esterase: x / RBC: x / WBC x   Sq Epi: x / Non Sq Epi: x / Bacteria: x        RADIOLOGY & ADDITIONAL TESTS:    Imaging Personally Reviewed:  [x] YES  [ ] NO    Case discussed with NPP:  [X] YES  [ ] NO

## 2024-02-22 NOTE — PROGRESS NOTE ADULT - ASSESSMENT
68 year old woman with PMH of Mechanical aortic valve, rheumatic heart diesase, afib, HTN, and DM2 admitted with euglycemic DKA and afib with RVR.     1) T2DM with hyperglycemia  #eDKA in setting of infection and afib /w RVR - now resolved  A1c 6.9%  Home regimen - jardiance and janumet  -FSG goal 140-180mg/dL while in ICU   - PT with improved glucose control, mildy above goal  - Increase lantus to 12 units  - continue admelog 3 units before meals  - continue low admelog correction scales before meals and bedtime  Discharge plan:  -follow up with outpatient endocrinologist Dr. Ch  -stop jardiance on discharge given urosepsis and EDKA. can consider resuming in future as outpt  -Consider discharge on janumet 50-1000mg BID, may need additional agent as well depending on blood glucose values      2)HTN  -enalapril had been on hold due to hypotension, consider resuming    3) HLD  -atorvastatin being held due to transaminitis    Discussed with MICU ACP    Annabelle Springer MD  On 2/22/24: via Teams or pager    Other times:  Diabetes team: [phone]   or email [email or web address]

## 2024-02-22 NOTE — PROGRESS NOTE ADULT - ASSESSMENT
68 F PMH Mitral regurgitation, rheumatic heart disease, atrial fibrillation, HTN, on Digoxin, Warfarin, DM 2 on Jardiance presenting with flu like symptoms, tachycardia, and hypotension. She was admitted to MICU w/ Urosepsis c/b septic shock, A fib w/ RVR, and euglycemic DKA, now improved.       Neuro:  Alert and orientated x3 at baseline  No active issues  - currently at AOx3, following commands  - oob to chair without any issues     Pulmonology  - CXR shows clear lungs  - Saturating well on room air, protecting airway  - POCUS 2/20 with b lines throughout b/l likely fluid overload   - s/p lasix 40mg IV       Cardiovascular:   #Atrial fibrillation with RVR  #Mitral valvue regurgitation  #Mechanical aortic valve replacement  #History of rheumatic heart disease  #Shock, Septic vs cardiogenic  - required pressors likely iso shock, Josep gtt weaned off since 12am on 2/20  - cards consulted will f/u recommendations   - s/p Amio bolus (2/20)  continue Amiodarone gtt x 24 hours, then PO amio load  - TTE 2/20  LV systolic function is severely decreased with EF 37 % a change from previous echo 11/23 will repeat ECHO today 2/22  - Continue Warfarin, INR goal 2.5-3.5 todays INR 4.63 will hold off dose   - c/w digoxin every other day and lopressor   - Digoxin by level, draw 2/23    #HTN  - Holding home Enalapril in the setting of hypotension for now     #HLD  - Atorvastatin 20 mg daily held iso elevated LFT's      GI  RICHY  # Transaminitis   - unknown etiology  - hepatitis panel neg  - RUQ US 2/20 w/findings suggestive of steatosis, cholelithiasis, GB nonspecific mural thickening  - continue to trend     Diet: consistent carb  - tolerating diet  - bowel regimen  -  2/21- loose stools  -c. diff negative     Renal  #Anion gap metabolic acidosis  #Lactic acidosis  #Ketoacidosis  -2/2 Euglycemic DKA vs Sepsis   - hyperlactatemia likely iso renal/liver dysfunction metformin vs urosepsis, continue to trend      #LUISA  - Cr from 11/2023 of 0.39  - Admission Cr of .89  - LUISA likely pre-renal in the setting of shock, now slightly downtrending  - S/P lasix 40mg IVP (2/21)   - diuretics PRN goal to keep net even  - indwelling catheter placed 2/20 was making good UO will do TOV       ID  #Septic shock in the setting of URI vs UTI    - Complained of burning on urination on 2/15, No CVA tenderness   - UA positive, urine culture 2/19 grew E coli  - blood culture ngtd 2/20  - RVP negative  - s/p Azithromyin x1, Ceftriaxone 1g daily (2/19-2/20) broadened to empiric zosyn (2/20-2/22)  iso increasing lactate  -will change back to ceftriaxone (2/22-   )       Endo  History of type DM2 on Jardiance, now presenting with infection, polyuria, polydipsia  Home regimen - jardiance and janumet  #Euglycemic DKA  #DM2  A1c 6.9%  ED: pH 7.27 AG 20, Bicarb 15, Glucose 201, Lactate 4  - s/p 4 L IVF in ED  - transitioned off insulin with 6 units Lantus (2/20)  - can start premeal insulin with admelog 3 units  - Increase lantus to 10 units daily  - continue low admelog correction scales before meals and bedtime    Endocrine following recs:  Discharge plan:  -follow up with outpatient endocrinologist Dr. Ch  -stop jardiance on discharge given urosepsis and EDKA. can consider resuming in future as outpt  -Will need to trend lactate closer to discharge to see if metformin can be safely resumed ( had decreased to 1.4, then increased to 2.1), if so then may resume janumet 50-1000mg BID, may need additional agent as well depending on blood glucose values      Heme/Onc  - Continue Warfarin with INR goal of 2.5-3.5   - INR 4.63 today, hgb stable, continue to monitor  - daily coags    Ethics  Full Code  - Family at bedside and updated on care    68 F PMH Mitral regurgitation, rheumatic heart disease, atrial fibrillation, HTN, on Digoxin, Warfarin, DM 2 on Jardiance presenting with flu like symptoms, tachycardia, and hypotension. She was admitted to MICU w/ Urosepsis c/b septic shock, A fib w/ RVR, and euglycemic DKA, now improved.       Neuro:  Alert and orientated x3 at baseline  No active issues  - currently at AOx3, following commands  - oob to chair without any issues     Pulmonology  - CXR shows clear lungs  - Saturating well on room air, protecting airway  - POCUS 2/20 with b lines throughout b/l likely fluid overload   - s/p lasix 40mg IV       Cardiovascular:   #Atrial fibrillation with RVR  #Mitral valvue regurgitation  #Mechanical aortic valve replacement  #History of rheumatic heart disease  #Shock, Septic vs cardiogenic  - required pressors likely iso shock, Josep gtt weaned off since 12am on 2/20  - cardiology following   - s/p Amio bolus (2/20)  continue Amiodarone gtt x 24 hours, then PO amio load but will D/C as per cardiology   - TTE 2/20  LV systolic function is severely decreased with EF 37 % a change from previous echo 11/23 will repeat ECHO today 2/22  - Continue Warfarin, INR goal 2.5-3.5 todays INR 4.63 will hold off dose today   - c/w digoxin every other day and toprol XL  - Digoxin by level, draw 2/23  -patient endorsed that she feels flushed and does not like lopressor IVP     #HTN  - Holding home Enalapril in the setting of hypotension for now     #HLD  - Atorvastatin 20 mg daily held iso elevated LFT's      GI  RICHY  # Transaminitis   - unknown etiology  - hepatitis panel neg  - RUQ US 2/20 w/findings suggestive of steatosis, cholelithiasis, GB nonspecific mural thickening  - continue to trend     Diet: consistent carb  - tolerating diet  - bowel regimen  -  2/21- loose stools  -c. diff negative     Renal  #Anion gap metabolic acidosis  #Lactic acidosis  #Ketoacidosis  -2/2 Euglycemic DKA vs Sepsis   - hyperlactatemia likely iso renal/liver dysfunction metformin vs urosepsis, continue to trend      #LUISA  - Cr from 11/2023 of 0.39  - Admission Cr of .89  - LUISA likely pre-renal in the setting of shock, now slightly downtrending  - S/P lasix 40mg IVP (2/21)   - diuretics PRN goal to keep net even  - indwelling catheter placed 2/20 was making good UO will do TOV       ID  #Septic shock in the setting of URI vs UTI    - Complained of burning on urination on 2/15, No CVA tenderness   - UA positive, urine culture 2/19 grew E coli  - blood culture ngtd 2/20  - RVP negative  - s/p Azithromyin x1, Ceftriaxone 1g daily (2/19-2/20) broadened to empiric zosyn (2/20-2/22)  iso increasing lactate  -will change back to ceftriaxone (2/22-   )   -ID following       Endo  History of type DM2 on Jardiance, now presenting with infection, polyuria, polydipsia  Home regimen - jardiance and janumet  #Euglycemic DKA  #DM2  A1c 6.9%  ED: pH 7.27 AG 20, Bicarb 15, Glucose 201, Lactate 4  - s/p 4 L IVF in ED  - transitioned off insulin with 6 units Lantus (2/20)  - can start premeal insulin with admelog 3 units  - Increase lantus to 10 units daily  - continue low admelog correction scales before meals and bedtime    Endocrine following recs:  Discharge plan:  -follow up with outpatient endocrinologist Dr. Ch  -stop jardiance on discharge given urosepsis and EDKA. can consider resuming in future as outpt  -Will need to trend lactate closer to discharge to see if metformin can be safely resumed ( had decreased to 1.4, then increased to 2.1), if so then may resume janumet 50-1000mg BID, may need additional agent as well depending on blood glucose values      Heme/Onc  - Continue Warfarin with INR goal of 2.5-3.5   - INR 4.63 today, hgb stable, continue to monitor  - daily coags    Ethics  Full Code  - Family at bedside and updated on care

## 2024-02-22 NOTE — PROGRESS NOTE ADULT - ASSESSMENT
68 y.o F with hx of rheumatic heart disease s/p mechanical aortic and mitral valve replacements on coumadin, a.fib, DM2 who presented to the ED with tachycardia found to be in rapid atrial fibrillation with hypotension briefly requiring pressors in the setting of fevers and chills concerning for septic shock. Her course was also complicated by DKA requiring fluid resuscitation and insulin drip. She had a TTE while in the tachyarrhythmia which demonstrated moderately reduced LVEF, however she remains in rapid atrial fibrillation.     1. Atrial Fibrillation ([id number] = 3)    - Likely triggered by acute infection/DKA  - Transition lopressor to toprolol 100mg BID (first dose this afternoon)  - Coumadin  - Can stop IV amiodarone (monitor LFT's)  - Currently on digoxin 125mcg daily  - Keep K > 4 and Mg >2  - Telemetry monitoring    2. Mechanical MR and AV  - Continue coumadin  - INR goal 2.5- 3.5    3. Moderately reduced LV EF  - Echo was done with patient particulary tachycardic   - Would repeat limited TTE for LV function once patient is better rate controlled      Lynsey Rodriguez MD  Cardiology Fellow

## 2024-02-22 NOTE — PROGRESS NOTE ADULT - SUBJECTIVE AND OBJECTIVE BOX
Interval Events:  -no acute events reported overnight     HPI:  68 year old female with PMH Mitral regurgitation, mechanical aortic valve replacement, rheumatic heart disease, atrial fibrillation, HTN, on Digoxin, Warfarin, DM 2 on Jardiance presenting with flu like symptoms from urgent care. 2 weeks ago patient started having cough, congestion, and intermittent fevers which improved. On 02/12 worsening URI with white psutum production. On 02/17 noticed dysuria and polyuria. Fevers improve with Tylenol. Went to urgent care today and found to be in Afib with RVR in the 180s and sent to the ED. No CP, SOB, abd pain, vomiting, diarrhea, edema, lightheadedness or dizziness, and palpitations.     In the ED Afebrile, BP of 90/60, HR in the 160s. Saturating well on room air. Received Acetaminophen, 2L IV bolus, 3 neosticks, started on Phenylephrine drip, and Lopressor 5 mg. Given Azithromycin and Ceftriaxone as well. Blood cultures pending.     Surgical History: Mitral valve repair, aortic valve repair  Allergies: NKDA   FH: None  SH: No smoking, no drinking, no illicit drug use    (19 Feb 2024 19:50)      REVIEW OF SYSTEMS:    [x ] All  systems negative  [ ] Unable to assess ROS because ________    OBJECTIVE:  ICU Vital Signs Last 24 Hrs  T(C): 36.3 (22 Feb 2024 08:00), Max: 37.4 (21 Feb 2024 16:00)  T(F): 97.4 (22 Feb 2024 08:00), Max: 99.4 (21 Feb 2024 16:00)  HR: 97 (22 Feb 2024 10:00) (86 - 150)  BP: 123/73 (22 Feb 2024 10:00) (106/59 - 144/89)  BP(mean): 90 (22 Feb 2024 10:00) (74 - 108)  ABP: --  ABP(mean): --  RR: 26 (22 Feb 2024 10:00) (18 - 35)  SpO2: 100% (22 Feb 2024 10:00) (99% - 100%)    O2 Parameters below as of 22 Feb 2024 10:00  Patient On (Oxygen Delivery Method): room air      02-21 @ 07:01  -  02-22 @ 07:00  --------------------------------------------------------  IN: 283.7 mL / OUT: 1895 mL / NET: -1611.3 mL      CAPILLARY BLOOD GLUCOSE      POCT Blood Glucose.: 175 mg/dL (22 Feb 2024 09:01)      Physical Exam:   Constitutional: ill appearing, no acute distress   HEENT: + PERRLA, EOMI, no drainage or redness  Neck: supple,  No JVD  Respiratory:  breath Sounds equal & clear bilaterally to auscultation, no accessory muscle use noted  Cardiovascular: Regular rate, regular rhythm, normal S1, S2; no murmurs or rub  Gastrointestinal: Soft, non-tender, non distended, no hepatosplenomegaly, normal bowel sounds  Extremities: + 2 peripheral edema, no cyanosis, no clubbing   Vascular: Equal and normal pulses: 2+ peripheral pulses throughout  Neurological: alert and oriented   Psychiatric: calm  Musculoskeletal: No joint swelling or deformity; no limitation of movement  Skin: warm, dry, well perfused, no rashes    HOSPITAL MEDICATIONS:  Standing Meds:  aMIOdarone    Tablet   Oral   aMIOdarone    Tablet 400 milliGRAM(s) Oral every 8 hours  aMIOdarone Infusion 1 mG/Min IV Continuous <Continuous>  cefTRIAXone   IVPB 1000 milliGRAM(s) IV Intermittent every 24 hours  dextrose 5%. 1000 milliLiter(s) IV Continuous <Continuous>  dextrose 5%. 1000 milliLiter(s) IV Continuous <Continuous>  dextrose 50% Injectable 25 Gram(s) IV Push once  dextrose 50% Injectable 12.5 Gram(s) IV Push once  dextrose 50% Injectable 25 Gram(s) IV Push once  digoxin     Tablet 125 MICROGram(s) Oral every other day  glucagon  Injectable 1 milliGRAM(s) IntraMuscular once  insulin glargine Injectable (LANTUS) 10 Unit(s) SubCutaneous <User Schedule>  insulin lispro (ADMELOG) corrective regimen sliding scale   SubCutaneous three times a day before meals  insulin lispro (ADMELOG) corrective regimen sliding scale   SubCutaneous at bedtime  insulin lispro Injectable (ADMELOG) 3 Unit(s) SubCutaneous before lunch  insulin lispro Injectable (ADMELOG) 3 Unit(s) SubCutaneous before breakfast  insulin lispro Injectable (ADMELOG) 3 Unit(s) SubCutaneous before dinner  metoprolol tartrate 50 milliGRAM(s) Oral every 8 hours      PRN Meds:  dextrose Oral Gel 15 Gram(s) Oral once PRN  guaiFENesin Oral Liquid (Sugar-Free) 200 milliGRAM(s) Oral every 6 hours PRN  sodium chloride 0.65% Nasal 1 Spray(s) Both Nostrils two times a day PRN      LABS:                        12.2   12.71 )-----------( 246      ( 22 Feb 2024 00:21 )             37.9     Hgb Trend: 12.2<--, 11.8<--, 12.8<--, 13.0<--, 12.8<--  02-22    135  |  101  |  19  ----------------------------<  199<H>  4.3   |  24  |  0.48<L>    Ca    8.2<L>      22 Feb 2024 00:21  Phos  1.4     02-22  Mg     1.80     02-22    TPro  5.9<L>  /  Alb  3.1<L>  /  TBili  0.5  /  DBili  x   /  AST  188<H>  /  ALT  445<H>  /  AlkPhos  160<H>  02-22    Creatinine Trend: 0.48<--, 0.64<--, 0.59<--, 0.43<--, 0.50<--, 0.57<--  PT/INR - ( 22 Feb 2024 00:21 )   PT: 50.0 sec;   INR: 4.63 ratio         PTT - ( 22 Feb 2024 00:21 )  PTT:46.5 sec  Urinalysis Basic - ( 22 Feb 2024 00:21 )    Color: x / Appearance: x / SG: x / pH: x  Gluc: 199 mg/dL / Ketone: x  / Bili: x / Urobili: x   Blood: x / Protein: x / Nitrite: x   Leuk Esterase: x / RBC: x / WBC x   Sq Epi: x / Non Sq Epi: x / Bacteria: x      Arterial Blood Gas:  02-20 @ 18:10  7.34/30/125/16/98.7/-8.3  ABG lactate: --    Venous Blood Gas:  02-22 @ 00:21  7.39/41/73/25/94.7  VBG Lactate: 1.8  Venous Blood Gas:  02-21 @ 16:36  7.35/46/30/25/44.5  VBG Lactate: 2.2  Venous Blood Gas:  02-21 @ 00:40  7.29/47/41/23/66.3  VBG Lactate: 2.4  Venous Blood Gas:  02-20 @ 17:35  7.28/51/22/24/28.0  VBG Lactate: 2.0  Venous Blood Gas:  02-20 @ 14:50  7.26/40/28/18/39.1  VBG Lactate: 6.3  Venous Blood Gas:  02-20 @ 11:44  7.22/54/27/22/38.0  VBG Lactate: 3.3      MICROBIOLOGY:     Culture - Sputum (collected 20 Feb 2024 12:40)  Source: .Sputum Sputum  Gram Stain (20 Feb 2024 23:17):    Numerous polymorphonuclear leukocytes per low power field    Few Squamous epithelial cells per low power field    Moderate Gram positive cocci in pairs per oil power field    Few Gram Variable Rods per oil power field  Final Report (22 Feb 2024 06:52):    Normal Respiratory Vera present    Culture - Urine (collected 20 Feb 2024 01:10)  Source: Catheterized Catheterized  Final Report (21 Feb 2024 07:26):    <10,000 CFU/mL Normal Urogenital Vera    Culture - Urine (collected 19 Feb 2024 18:18)  Source: Clean Catch Clean Catch (Midstream)  Final Report (22 Feb 2024 09:45):    50,000 - 99,000 CFU/mL Escherichia coli    <10,000 CFU/ml Normal Urogenital vera present  Organism: Escherichia coli (22 Feb 2024 09:45)  Organism: Escherichia coli (22 Feb 2024 09:45)    Culture - Blood (collected 19 Feb 2024 15:21)  Source: .Blood Blood-Peripheral  Preliminary Report (21 Feb 2024 19:01):    No growth at 48 Hours    Culture - Blood (collected 19 Feb 2024 14:43)  Source: .Blood Blood-Peripheral  Preliminary Report (21 Feb 2024 17:02):    No growth at 48 Hours

## 2024-02-22 NOTE — PROGRESS NOTE ADULT - SUBJECTIVE AND OBJECTIVE BOX
Diabetes  Follow up note    Interval History/ROS: pt eating meals, no nausea    MEDICATIONS  (STANDING):  cefTRIAXone   IVPB      dextrose 5%. 1000 milliLiter(s) (50 mL/Hr) IV Continuous <Continuous>  dextrose 5%. 1000 milliLiter(s) (100 mL/Hr) IV Continuous <Continuous>  dextrose 50% Injectable 25 Gram(s) IV Push once  dextrose 50% Injectable 12.5 Gram(s) IV Push once  dextrose 50% Injectable 25 Gram(s) IV Push once  digoxin     Tablet 125 MICROGram(s) Oral every other day  glucagon  Injectable 1 milliGRAM(s) IntraMuscular once  insulin glargine Injectable (LANTUS) 10 Unit(s) SubCutaneous <User Schedule>  insulin lispro (ADMELOG) corrective regimen sliding scale   SubCutaneous three times a day before meals  insulin lispro (ADMELOG) corrective regimen sliding scale   SubCutaneous at bedtime  insulin lispro Injectable (ADMELOG) 3 Unit(s) SubCutaneous before lunch  insulin lispro Injectable (ADMELOG) 3 Unit(s) SubCutaneous before breakfast  insulin lispro Injectable (ADMELOG) 3 Unit(s) SubCutaneous before dinner  metoprolol succinate  milliGRAM(s) Oral two times a day  metoprolol tartrate Injectable 5 milliGRAM(s) IV Push once    MEDICATIONS  (PRN):  dextrose Oral Gel 15 Gram(s) Oral once PRN Blood Glucose LESS THAN 70 milliGRAM(s)/deciliter  guaiFENesin Oral Liquid (Sugar-Free) 200 milliGRAM(s) Oral every 6 hours PRN Cough  sodium chloride 0.65% Nasal 1 Spray(s) Both Nostrils two times a day PRN Nasal Congestion      Allergies    No Known Allergies    Intolerances          PHYSICAL EXAM:  VITALS: T(C): 36.4 (02-22-24 @ 12:00)  T(F): 97.5 (02-22-24 @ 12:00), Max: 99.4 (02-21-24 @ 16:00)  HR: 121 (02-22-24 @ 14:00) (86 - 150)  BP: 126/89 (02-22-24 @ 14:00) (106/59 - 144/89)  RR:  (18 - 35)  SpO2:  (96% - 100%)  GENERAL: Sitting up in chair in NAD,   EYES: No proptosis,  HEENT:  Atraumatic, Normocephalic,   RESPIRATORY: Clear to auscultation bilaterally  CARDIOVASCULAR: Irregular;  no peripheral edema  GI: Soft, nontender, non distended, normal bowel sounds        POCT Blood Glucose.: 194 mg/dL (02-22-24 @ 11:43)  POCT Blood Glucose.: 175 mg/dL (02-22-24 @ 09:01)  POCT Blood Glucose.: 215 mg/dL (02-21-24 @ 21:40)  POCT Blood Glucose.: 305 mg/dL (02-21-24 @ 16:59)  POCT Blood Glucose.: 315 mg/dL (02-21-24 @ 11:38)  POCT Blood Glucose.: 270 mg/dL (02-21-24 @ 02:02)  POCT Blood Glucose.: 301 mg/dL (02-20-24 @ 22:10)  POCT Blood Glucose.: 197 mg/dL (02-20-24 @ 18:57)  POCT Blood Glucose.: 184 mg/dL (02-20-24 @ 17:30)  POCT Blood Glucose.: 134 mg/dL (02-20-24 @ 16:03)  POCT Blood Glucose.: 82 mg/dL (02-20-24 @ 15:00)  POCT Blood Glucose.: 134 mg/dL (02-20-24 @ 14:01)  POCT Blood Glucose.: 162 mg/dL (02-20-24 @ 12:26)  POCT Blood Glucose.: 190 mg/dL (02-20-24 @ 10:08)  POCT Blood Glucose.: 201 mg/dL (02-20-24 @ 09:16)  POCT Blood Glucose.: 188 mg/dL (02-20-24 @ 08:10)  POCT Blood Glucose.: 195 mg/dL (02-20-24 @ 06:56)  POCT Blood Glucose.: 203 mg/dL (02-20-24 @ 06:07)  POCT Blood Glucose.: 178 mg/dL (02-20-24 @ 05:05)  POCT Blood Glucose.: 153 mg/dL (02-20-24 @ 04:02)  POCT Blood Glucose.: 174 mg/dL (02-20-24 @ 03:07)  POCT Blood Glucose.: 216 mg/dL (02-20-24 @ 02:01)  POCT Blood Glucose.: 205 mg/dL (02-20-24 @ 01:04)  POCT Blood Glucose.: 89 mg/dL (02-20-24 @ 00:12)  POCT Blood Glucose.: 133 mg/dL (02-19-24 @ 23:13)  POCT Blood Glucose.: 156 mg/dL (02-19-24 @ 22:17)  POCT Blood Glucose.: 191 mg/dL (02-19-24 @ 20:33)        02-22    135  |  101  |  19  ----------------------------<  199<H>  4.3   |  24  |  0.48<L>    eGFR: 104    Ca    8.2<L>      02-22  Mg     1.80     02-22  Phos  1.4     02-22    TPro  5.9<L>  /  Alb  3.1<L>  /  TBili  0.5  /  DBili  x   /  AST  188<H>  /  ALT  445<H>  /  AlkPhos  160<H>  02-22        A1C with Estimated Average Glucose Result: 6.9 % (02-20-24 @ 00:25)  A1C with Estimated Average Glucose Result: 6.6 % (10-31-23 @ 05:58)

## 2024-02-23 LAB
ALBUMIN SERPL ELPH-MCNC: 3 G/DL — LOW (ref 3.3–5)
ALP SERPL-CCNC: 140 U/L — HIGH (ref 40–120)
ALT FLD-CCNC: 339 U/L — HIGH (ref 4–33)
ANION GAP SERPL CALC-SCNC: 10 MMOL/L — SIGNIFICANT CHANGE UP (ref 7–14)
APTT BLD: 39.2 SEC — HIGH (ref 24.5–35.6)
AST SERPL-CCNC: 101 U/L — HIGH (ref 4–32)
BASOPHILS # BLD AUTO: 0.05 K/UL — SIGNIFICANT CHANGE UP (ref 0–0.2)
BASOPHILS NFR BLD AUTO: 0.5 % — SIGNIFICANT CHANGE UP (ref 0–2)
BILIRUB SERPL-MCNC: 0.6 MG/DL — SIGNIFICANT CHANGE UP (ref 0.2–1.2)
BUN SERPL-MCNC: 14 MG/DL — SIGNIFICANT CHANGE UP (ref 7–23)
CALCIUM SERPL-MCNC: 8.3 MG/DL — LOW (ref 8.4–10.5)
CHLORIDE SERPL-SCNC: 102 MMOL/L — SIGNIFICANT CHANGE UP (ref 98–107)
CO2 SERPL-SCNC: 26 MMOL/L — SIGNIFICANT CHANGE UP (ref 22–31)
CREAT SERPL-MCNC: 0.39 MG/DL — LOW (ref 0.5–1.3)
DIGOXIN SERPL-MCNC: 0.5 NG/ML — LOW (ref 0.8–2)
EGFR: 110 ML/MIN/1.73M2 — SIGNIFICANT CHANGE UP
EOSINOPHIL # BLD AUTO: 0.05 K/UL — SIGNIFICANT CHANGE UP (ref 0–0.5)
EOSINOPHIL NFR BLD AUTO: 0.5 % — SIGNIFICANT CHANGE UP (ref 0–6)
GLUCOSE BLDC GLUCOMTR-MCNC: 123 MG/DL — HIGH (ref 70–99)
GLUCOSE BLDC GLUCOMTR-MCNC: 148 MG/DL — HIGH (ref 70–99)
GLUCOSE BLDC GLUCOMTR-MCNC: 161 MG/DL — HIGH (ref 70–99)
GLUCOSE BLDC GLUCOMTR-MCNC: 172 MG/DL — HIGH (ref 70–99)
GLUCOSE BLDC GLUCOMTR-MCNC: 215 MG/DL — HIGH (ref 70–99)
GLUCOSE SERPL-MCNC: 131 MG/DL — HIGH (ref 70–99)
HCT VFR BLD CALC: 36 % — SIGNIFICANT CHANGE UP (ref 34.5–45)
HGB BLD-MCNC: 11.5 G/DL — SIGNIFICANT CHANGE UP (ref 11.5–15.5)
IANC: 7.13 K/UL — SIGNIFICANT CHANGE UP (ref 1.8–7.4)
IMM GRANULOCYTES NFR BLD AUTO: 1.5 % — HIGH (ref 0–0.9)
INR BLD: 2.3 RATIO — HIGH (ref 0.85–1.18)
LYMPHOCYTES # BLD AUTO: 1.38 K/UL — SIGNIFICANT CHANGE UP (ref 1–3.3)
LYMPHOCYTES # BLD AUTO: 14 % — SIGNIFICANT CHANGE UP (ref 13–44)
MAGNESIUM SERPL-MCNC: 1.8 MG/DL — SIGNIFICANT CHANGE UP (ref 1.6–2.6)
MCHC RBC-ENTMCNC: 28.6 PG — SIGNIFICANT CHANGE UP (ref 27–34)
MCHC RBC-ENTMCNC: 31.9 GM/DL — LOW (ref 32–36)
MCV RBC AUTO: 89.6 FL — SIGNIFICANT CHANGE UP (ref 80–100)
MONOCYTES # BLD AUTO: 1.12 K/UL — HIGH (ref 0–0.9)
MONOCYTES NFR BLD AUTO: 11.3 % — SIGNIFICANT CHANGE UP (ref 2–14)
NEUTROPHILS # BLD AUTO: 7.13 K/UL — SIGNIFICANT CHANGE UP (ref 1.8–7.4)
NEUTROPHILS NFR BLD AUTO: 72.2 % — SIGNIFICANT CHANGE UP (ref 43–77)
NRBC # BLD: 0 /100 WBCS — SIGNIFICANT CHANGE UP (ref 0–0)
NRBC # FLD: 0 K/UL — SIGNIFICANT CHANGE UP (ref 0–0)
PHOSPHATE SERPL-MCNC: 1.5 MG/DL — LOW (ref 2.5–4.5)
PLATELET # BLD AUTO: 263 K/UL — SIGNIFICANT CHANGE UP (ref 150–400)
POTASSIUM SERPL-MCNC: 4 MMOL/L — SIGNIFICANT CHANGE UP (ref 3.5–5.3)
POTASSIUM SERPL-SCNC: 4 MMOL/L — SIGNIFICANT CHANGE UP (ref 3.5–5.3)
PROT SERPL-MCNC: 6.2 G/DL — SIGNIFICANT CHANGE UP (ref 6–8.3)
PROTHROM AB SERPL-ACNC: 25.4 SEC — HIGH (ref 9.5–13)
RBC # BLD: 4.02 M/UL — SIGNIFICANT CHANGE UP (ref 3.8–5.2)
RBC # FLD: 14.6 % — HIGH (ref 10.3–14.5)
SODIUM SERPL-SCNC: 138 MMOL/L — SIGNIFICANT CHANGE UP (ref 135–145)
WBC # BLD: 9.88 K/UL — SIGNIFICANT CHANGE UP (ref 3.8–10.5)
WBC # FLD AUTO: 9.88 K/UL — SIGNIFICANT CHANGE UP (ref 3.8–10.5)

## 2024-02-23 PROCEDURE — 99233 SBSQ HOSP IP/OBS HIGH 50: CPT

## 2024-02-23 PROCEDURE — 99232 SBSQ HOSP IP/OBS MODERATE 35: CPT

## 2024-02-23 RX ORDER — POTASSIUM PHOSPHATE, MONOBASIC POTASSIUM PHOSPHATE, DIBASIC 236; 224 MG/ML; MG/ML
30 INJECTION, SOLUTION INTRAVENOUS ONCE
Refills: 0 | Status: COMPLETED | OUTPATIENT
Start: 2024-02-23 | End: 2024-02-23

## 2024-02-23 RX ORDER — WARFARIN SODIUM 2.5 MG/1
2.5 TABLET ORAL ONCE
Refills: 0 | Status: DISCONTINUED | OUTPATIENT
Start: 2024-02-23 | End: 2024-02-23

## 2024-02-23 RX ORDER — WARFARIN SODIUM 2.5 MG/1
5 TABLET ORAL ONCE
Refills: 0 | Status: COMPLETED | OUTPATIENT
Start: 2024-02-23 | End: 2024-02-23

## 2024-02-23 RX ADMIN — Medication 100 MILLIGRAM(S): at 18:35

## 2024-02-23 RX ADMIN — Medication 1: at 11:30

## 2024-02-23 RX ADMIN — Medication 3 UNIT(S): at 16:48

## 2024-02-23 RX ADMIN — WARFARIN SODIUM 5 MILLIGRAM(S): 2.5 TABLET ORAL at 22:09

## 2024-02-23 RX ADMIN — Medication 3 UNIT(S): at 11:30

## 2024-02-23 RX ADMIN — Medication 125 MICROGRAM(S): at 11:31

## 2024-02-23 RX ADMIN — Medication 100 MILLIGRAM(S): at 05:46

## 2024-02-23 RX ADMIN — POTASSIUM PHOSPHATE, MONOBASIC POTASSIUM PHOSPHATE, DIBASIC 83.33 MILLIMOLE(S): 236; 224 INJECTION, SOLUTION INTRAVENOUS at 08:57

## 2024-02-23 RX ADMIN — CEFTRIAXONE 100 MILLIGRAM(S): 500 INJECTION, POWDER, FOR SOLUTION INTRAMUSCULAR; INTRAVENOUS at 12:22

## 2024-02-23 RX ADMIN — INSULIN GLARGINE 12 UNIT(S): 100 INJECTION, SOLUTION SUBCUTANEOUS at 18:31

## 2024-02-23 RX ADMIN — Medication 3 UNIT(S): at 07:44

## 2024-02-23 NOTE — PROGRESS NOTE ADULT - ASSESSMENT
69 y/o F PMhx mechanical aortic replacement, rheumatic heart disease, mitral valve repair, atrial fibrillation, HTN, DM 2 on Jardiance who presented w/ URI symptoms and dysuria  found to have septic shock, DKA, UTI, elevated liver enzymes    UTI  septic shock- resolved  dysuria, frequency, urgency on admission  no flank tenderness  urine cx w/ E Coli, sensitivities reviewed  leukocytosis resolved    elevated liver enzymes- improving  hepatic viral serologies negative  abd US- suggestive of steatosis. Cholelithiasis. Nonspecific mild mural thickening. Findings likely associated with underlying liver disease.    Recommendations  c/w ceftriaxone 1g daily  plan for 7 day course w/ last day 2/25  on discharge transition to cefuroxime 250mg PO bid    Trell Franklin M.D.  [facility], Division of Infectious Diseases  [phone]  After 5pm on weekdays and all day on weekends - please call [phone]

## 2024-02-23 NOTE — PHYSICAL THERAPY INITIAL EVALUATION ADULT - LEVEL OF INDEPENDENCE: SUPINE/SIT, REHAB EVAL
CERTIFICATE OF {SCHOOL/WORK/SPORTS:398825}      August 19, 2020      Re: Nehal Chapman   [address]  [place] [address]      This is to certify that Nehal Chapman has been under my care from 8/19/2020 and {RETURN TO WORK OR SCHOOL:519640}    RESTRICTIONS: ***      REMARKS: ***        SIGNATURE:___________________________________________          Armand Walker MD  [facility]  [address] [place] [address]  Dept Phone: [phone]   independent

## 2024-02-23 NOTE — PROGRESS NOTE ADULT - ASSESSMENT
68 y.o F with hx of rheumatic heart disease s/p mechanical aortic and mitral valve replacements on coumadin, a.fib, DM2 who presented to the ED with tachycardia found to be in rapid atrial fibrillation with hypotension briefly requiring pressors in the setting of fevers and chills concerning for septic shock. Her course was also complicated by DKA requiring fluid resuscitation and insulin drip. She had a TTE while in the tachyarrhythmia which demonstrated moderately reduced LVEF, however she remains in rapid atrial fibrillation.     1. Atrial Fibrillation ([id number] = 3)    - Likely triggered by acute infection/DKA  - Continue toprolol 100mg Q12  - Coumadin  - Currently on digoxin 125mcg daily  - Keep K > 4 and Mg >2  - Telemetry monitoring    2. Mechanical MR and AV  - Continue coumadin  - INR goal 2.5- 3.5    3. Moderately reduced LV EF  - Echo was done with patient particulary tachycardic   - Would repeat limited TTE for LV function once patient is better rate controlled    Cardiology will signoff, please reconsult with questions      Lynsey Rodriguez MD  Cardiology Fellow

## 2024-02-23 NOTE — PROGRESS NOTE ADULT - SUBJECTIVE AND OBJECTIVE BOX
Subjective    Patient seen and examined at bedside. She appears much better and continues to feel well without palpitations       Telemetry review: Rate controlled Brooke    Current Meds:  cefTRIAXone   IVPB      cefTRIAXone   IVPB 1000 milliGRAM(s) IV Intermittent every 24 hours  dextrose 5%. 1000 milliLiter(s) IV Continuous <Continuous>  dextrose 5%. 1000 milliLiter(s) IV Continuous <Continuous>  dextrose 50% Injectable 25 Gram(s) IV Push once  dextrose 50% Injectable 12.5 Gram(s) IV Push once  dextrose 50% Injectable 25 Gram(s) IV Push once  dextrose Oral Gel 15 Gram(s) Oral once PRN  digoxin     Tablet 125 MICROGram(s) Oral every other day  glucagon  Injectable 1 milliGRAM(s) IntraMuscular once  guaiFENesin Oral Liquid (Sugar-Free) 200 milliGRAM(s) Oral every 6 hours PRN  insulin glargine Injectable (LANTUS) 12 Unit(s) SubCutaneous <User Schedule>  insulin lispro (ADMELOG) corrective regimen sliding scale   SubCutaneous three times a day before meals  insulin lispro (ADMELOG) corrective regimen sliding scale   SubCutaneous at bedtime  insulin lispro Injectable (ADMELOG) 3 Unit(s) SubCutaneous before lunch  insulin lispro Injectable (ADMELOG) 3 Unit(s) SubCutaneous before breakfast  insulin lispro Injectable (ADMELOG) 3 Unit(s) SubCutaneous before dinner  metoprolol succinate  milliGRAM(s) Oral two times a day  sodium chloride 0.65% Nasal 1 Spray(s) Both Nostrils two times a day PRN  warfarin 5 milliGRAM(s) Oral once      Vitals:  T(F): 97.2 (02-23), Max: 97.9 (02-22)  HR: 103 (02-23) (97 - 126)  BP: 131/81 (02-23) (119/88 - 137/115)  RR: 17 (02-23)  SpO2: 98% (02-23)  I&O's Summary    22 Feb 2024 07:01  -  23 Feb 2024 07:00  --------------------------------------------------------  IN: 120 mL / OUT: 550 mL / NET: -430 mL      Physical Exam:  General: No acute distress; well appearing  Cardiovascular: Irregularly irregular, with loud metallic click consistent with mechanical heart valve  Respiratory: Clear to auscultation bilaterally, speaking full sentences  Gastrointestinal: soft, non-tender, non-distended with normal bowel sounds  Extremities: 2+ pulses, no clubbing; no joint deformity, or edema  Neurologic: AAOx3,  Non-focal                          11.5   9.88  )-----------( 263      ( 23 Feb 2024 05:00 )             36.0     02-23    138  |  102  |  14  ----------------------------<  131<H>  4.0   |  26  |  0.39<L>    Ca    8.3<L>      23 Feb 2024 05:00  Phos  1.5     02-23  Mg     1.80     02-23    TPro  6.2  /  Alb  3.0<L>  /  TBili  0.6  /  DBili  x   /  AST  101<H>  /  ALT  339<H>  /  AlkPhos  140<H>  02-23    PT/INR - ( 23 Feb 2024 05:00 )   PT: 25.4 sec;   INR: 2.30 ratio         PTT - ( 23 Feb 2024 05:00 )  PTT:39.2 sec  CARDIAC MARKERS ( 20 Feb 2024 11:44 )  62 ng/L / x     / x     / x     / x     / x      CARDIAC MARKERS ( 19 Feb 2024 16:45 )  98 ng/L / x     / x     / x     / x     / 2.7 ng/mL  CARDIAC MARKERS ( 19 Feb 2024 14:43 )  103 ng/L / x     / x     / x     / x     / x        _______________________________________________________________________________________     CONCLUSIONS:      1. Techincally difficult study to assess left ventricular function given patients tachyarrhythmia. The LV function appears moderately to severely depressed.   2. The right ventricle is not well visualized.   3. Mechanical valve present in the mitral position.   4. A mechanical valve replacement is present in the aortic position. Mild intravalvular regurgitation.   5. Mild to moderate tricuspid regurgitation.   6. Technically difficult image quality.    ________________________________________________________________________________________  FINDINGS:     Left Ventricle:  The left ventricular cavity is small. Left ventricular systolic function is severely decreased with a calculated ejection fraction of 37 % by the Bunch's biplane method of disks. There is global left ventricular hypokinesis. Techincally difficult study to assess left ventricular function givenpatients tachyarrhythmia. The LV function appears moderately to severely depressed.     Right Ventricle:  The right ventricle is not well visualized. The right ventricular cavity is mildly enlarged in size, with normal wall thickness and probably normal systolic function.     Right Atrium:  The right atrium is normal in size with an indexed volume of 24.92 ml/m².     Interatrial Septum:  The interatrial septum appears intact.     Aortic Valve:  A mechanical valve replacement is present in the aortic position. There is mild intravalvular regurgitation.     Mitral Valve:  Mechanical valve is present in the mitral position. Prosthetic mitral valve leaflets are not well visualized. Transvalvular mitral gradients are normal.     Tricuspid Valve:  There is mild to moderate tricuspid regurgitation.     Pulmonic Valve:  Structurally normal pulmonic valve with normal leaflet excursion. There is mild pulmonic regurgitation.     Aorta:  The aortic root at the sinuses of Valsalva is normal in size. The aortic arch diameter is normal in size.

## 2024-02-23 NOTE — PROGRESS NOTE ADULT - ASSESSMENT
68 year old woman with PMH of Mechanical aortic valve, rheumatic heart diesase, afib, HTN, and DM2 admitted with euglycemic DKA and afib with RVR.     1) T2DM with hyperglycemia  #eDKA in setting of infection and afib /w RVR - now resolved  A1c 6.9%  Home regimen - jardiance and janumet  -FSG goal 140-180mg/dL while in ICU   - PT with improved glucose control today  - Continue lantus to 12 units  - continue admelog 3 units before meals  - continue low admelog correction scales before meals and bedtime  Discharge plan:  -follow up with outpatient endocrinologist Dr. Ch  -stop jardiance on discharge given urosepsis and EDKA. can consider resuming in future as outpt  -Likely discharge on janumet 50-1000mg BID, may need additional agent as well depending on blood glucose values.  Would repeat lactate to confirm normal now.      2)HTN  -enalapril had been on hold due to hypotension, consider resuming    3) HLD  -atorvastatin being held due to transaminitis, values improving        Annabelle Springer MD  On 2/23/24: via Teams or pager    Other times:  Diabetes team: [phone]   or email [email or web address]

## 2024-02-23 NOTE — PROGRESS NOTE ADULT - ASSESSMENT
68-yo Female with PMHx of mitral regurgitation, rheumatic heart disease s/p mechanical AVR/MVR, HTN, chronic afib on digoxin + warfarin, DM2 on Jardiance, who was admitted to MICU w/septic shock 2' UTI, afib w/RVR, and euglycemic DKA    Septic shock 2' UTI  - admitted to MICU  - s/p IVF resuscitation  - weaned off pressor support 2/20/24  - blood cxs 2/19/24 --> (-)  - urine cx 2/19/24 --> non-ESBL E coli  - received ceftriaxone/azithromycin x 1 2/19/24  - zosyn 2/20/24 --> 2/22/24  - ceftriaxone 2/22/24 -->   - appreciate MICU care, ID input    Chronic atrial fibrillation with RVR  - s/p amiodarone bolus (2/20)  continue Amiodarone gtt x 24 hours, then PO amio load  - TTE 2/20  LV systolic function is severely decreased with EF 37 % a change from previous echo 11/23 will repeat ECHO today 2/22  - continue warfarin, INR goal 2.5-3.5  - c/w digoxin every other day and lopressor   - appreciate cardiology    Mechanical aortic/mitral valve replacement in setting of rheumatic heart disease  - continue warfarin, INR goal 2.5-3.5; warfarin 5 mg x 1 2/23/24  - may need heparin gtt 2/24/24    Euglycemic DKA  - A1c 6.9%  - ED: pH 7.27 AG 20, Bicarb 15, Glucose 201, Lactate 4  - s/p 4 L IVF in ED  - transitioned off insulin with 6 units Lantus (2/20)  - basal/bolus insulin dosing as per endocrinology  - continue low admelog correction scales before meals and bedtime  - follow up with outpatient endocrinologist Dr. Ch  - stop jardiance on discharge given urosepsis and EDKA. can consider resuming in future as outpt  - will need to trend lactate closer to discharge to see if metformin can be safely resumed ( had decreased to 1.4, then increased to 2.1), if so then may resume janumet 50-1000mg BID, may need additional agent as well depending on blood glucose values  - appreciate endocrinology    LUISA  - Cr from 11/2023 of 0.39  - admission Cr of 0.89  - LUISA likely pre-renal in the setting of shock  - s/p lasix 40mg IVP (2/21)   - diuretics PRN goal to keep net even  - indwelling catheter placed 2/20, passed TOV 2/22/24  - resolved    HTN  - holding home Enalapril in the setting of hypotension for now     HLD  - atorvastatin 20 mg daily held iso elevated LFT's    Transaminitis   - unknown etiology  - hepatitis panel neg  - RUQ US 2/20 w/findings suggestive of steatosis, cholelithiasis, GB nonspecific mural thickening  - continue to trend     Need for prophylaxis  - daily dosed coumadin

## 2024-02-23 NOTE — PROGRESS NOTE ADULT - SUBJECTIVE AND OBJECTIVE BOX
Rate-controlled overnight  Went for walk this morning without any tachycardia or cardiac symptoms   @ bedside  Spoke w/daughter over phone    Vital Signs Last 24 Hrs  T(C): 36.2 (23 Feb 2024 05:47), Max: 36.6 (22 Feb 2024 19:12)  T(F): 97.2 (23 Feb 2024 05:47), Max: 97.9 (22 Feb 2024 19:12)  HR: 103 (23 Feb 2024 05:47) (97 - 126)  BP: 131/81 (23 Feb 2024 05:47) (119/88 - 137/115)  BP(mean): 91 (22 Feb 2024 15:00) (90 - 123)  RR: 17 (23 Feb 2024 05:47) (17 - 33)  SpO2: 98% (23 Feb 2024 05:47) (96% - 100%)    I&O's Summary    02-22-24 @ 07:01  -  02-23-24 @ 07:00  --------------------------------------------------------  IN: 120 mL / OUT: 550 mL / NET: -430 mL        GENERAL: NAD  HEAD:  Atraumatic, Normocephalic  EYES: EOMI, PERRLA, conjunctiva and sclera clear  NECK: Supple, No JVD  CHEST/LUNG: Clear to auscultation bilaterally; Normal respiratory effort w/o intercostal retractions  HEART: Irregular rhythm, tachycardic; nl S1/S2; No murmurs, rubs, or gallops  ABDOMEN: Soft, Nontender, Nondistended; Bowel sounds present; No hepatosplenomegaly  EXTREMITIES:  2+ Peripheral Pulses; No clubbing, cyanosis, or edema b/l; Nl ROM  NEURO: A+O x 3; nonfocal CN/motor/sensory/reflexes; speech + comprehension are intact  PSYCH: Nl affect; no delirium or agitation; no suicidal/homicidal ideation    LABS:                        11.5   9.88  )-----------( 263      ( 23 Feb 2024 05:00 )             36.0     02-23    138  |  102  |  14  ----------------------------<  131<H>  4.0   |  26  |  0.39<L>    Ca    8.3<L>      23 Feb 2024 05:00  Phos  1.5     02-23  Mg     1.80     02-23    TPro  6.2  /  Alb  3.0<L>  /  TBili  0.6  /  DBili  x   /  AST  101<H>  /  ALT  339<H>  /  AlkPhos  140<H>  02-23    PT/INR - ( 23 Feb 2024 05:00 )   PT: 25.4 sec;   INR: 2.30 ratio         PTT - ( 23 Feb 2024 05:00 )  PTT:39.2 sec  CAPILLARY BLOOD GLUCOSE      POCT Blood Glucose.: 123 mg/dL (23 Feb 2024 07:23)  POCT Blood Glucose.: 208 mg/dL (22 Feb 2024 21:20)  POCT Blood Glucose.: 222 mg/dL (22 Feb 2024 16:59)  POCT Blood Glucose.: 194 mg/dL (22 Feb 2024 11:43)        Urinalysis Basic - ( 23 Feb 2024 05:00 )    Color: x / Appearance: x / SG: x / pH: x  Gluc: 131 mg/dL / Ketone: x  / Bili: x / Urobili: x   Blood: x / Protein: x / Nitrite: x   Leuk Esterase: x / RBC: x / WBC x   Sq Epi: x / Non Sq Epi: x / Bacteria: x        RADIOLOGY & ADDITIONAL TESTS:    Imaging Personally Reviewed:  [x] YES  [ ] NO    Case discussed with NPP:  [X] YES  [ ] NO

## 2024-02-23 NOTE — PHYSICAL THERAPY INITIAL EVALUATION ADULT - PERTINENT HX OF CURRENT PROBLEM, REHAB EVAL
68 year old Female who presented to the ED with tachycardia found to be in rapid atrial fibrillation with hypotension briefly requiring pressors in the setting of fevers and chills concerning for septic shock.

## 2024-02-23 NOTE — PROGRESS NOTE ADULT - SUBJECTIVE AND OBJECTIVE BOX
Diabetes  Follow up note    Interval History/ROS: no nausea, eating meals and nutritional supplement    MEDICATIONS  (STANDING):  cefTRIAXone   IVPB 1000 milliGRAM(s) IV Intermittent every 24 hours  cefTRIAXone   IVPB      dextrose 5%. 1000 milliLiter(s) (50 mL/Hr) IV Continuous <Continuous>  dextrose 5%. 1000 milliLiter(s) (100 mL/Hr) IV Continuous <Continuous>  dextrose 50% Injectable 25 Gram(s) IV Push once  dextrose 50% Injectable 12.5 Gram(s) IV Push once  dextrose 50% Injectable 25 Gram(s) IV Push once  digoxin     Tablet 125 MICROGram(s) Oral every other day  glucagon  Injectable 1 milliGRAM(s) IntraMuscular once  insulin glargine Injectable (LANTUS) 12 Unit(s) SubCutaneous <User Schedule>  insulin lispro (ADMELOG) corrective regimen sliding scale   SubCutaneous three times a day before meals  insulin lispro (ADMELOG) corrective regimen sliding scale   SubCutaneous at bedtime  insulin lispro Injectable (ADMELOG) 3 Unit(s) SubCutaneous before lunch  insulin lispro Injectable (ADMELOG) 3 Unit(s) SubCutaneous before breakfast  insulin lispro Injectable (ADMELOG) 3 Unit(s) SubCutaneous before dinner  metoprolol succinate  milliGRAM(s) Oral two times a day  warfarin 5 milliGRAM(s) Oral once    MEDICATIONS  (PRN):  dextrose Oral Gel 15 Gram(s) Oral once PRN Blood Glucose LESS THAN 70 milliGRAM(s)/deciliter  guaiFENesin Oral Liquid (Sugar-Free) 200 milliGRAM(s) Oral every 6 hours PRN Cough  sodium chloride 0.65% Nasal 1 Spray(s) Both Nostrils two times a day PRN Nasal Congestion      Allergies    No Known Allergies    Intolerances          PHYSICAL EXAM:  VITALS: T(C): 36.3 (02-23-24 @ 09:48)  T(F): 97.3 (02-23-24 @ 09:48), Max: 97.9 (02-22-24 @ 19:12)  HR: 89 (02-23-24 @ 09:48) (89 - 124)  BP: 109/79 (02-23-24 @ 09:48) (109/79 - 136/87)  RR:  (17 - 33)  SpO2:  (97% - 100%)  GENERAL: Sitting up in bed in  NAD,   EYES: No proptosis,  HEENT:  Atraumatic, Normocephalic,   RESPIRATORY: Clear to auscultation bilaterally  CARDIOVASCULAR: Irreg, no peripheral edema  GI: Soft, nontender, non distended, normal bowel sounds      POCT Blood Glucose.: 172 mg/dL (02-23-24 @ 11:17)  POCT Blood Glucose.: 123 mg/dL (02-23-24 @ 07:23)  POCT Blood Glucose.: 208 mg/dL (02-22-24 @ 21:20)  POCT Blood Glucose.: 222 mg/dL (02-22-24 @ 16:59)  POCT Blood Glucose.: 194 mg/dL (02-22-24 @ 11:43)  POCT Blood Glucose.: 175 mg/dL (02-22-24 @ 09:01)  POCT Blood Glucose.: 215 mg/dL (02-21-24 @ 21:40)  POCT Blood Glucose.: 305 mg/dL (02-21-24 @ 16:59)  POCT Blood Glucose.: 315 mg/dL (02-21-24 @ 11:38)  POCT Blood Glucose.: 270 mg/dL (02-21-24 @ 02:02)  POCT Blood Glucose.: 301 mg/dL (02-20-24 @ 22:10)  POCT Blood Glucose.: 197 mg/dL (02-20-24 @ 18:57)  POCT Blood Glucose.: 184 mg/dL (02-20-24 @ 17:30)  POCT Blood Glucose.: 134 mg/dL (02-20-24 @ 16:03)  POCT Blood Glucose.: 82 mg/dL (02-20-24 @ 15:00)  POCT Blood Glucose.: 134 mg/dL (02-20-24 @ 14:01)        02-23    138  |  102  |  14  ----------------------------<  131<H>  4.0   |  26  |  0.39<L>    eGFR: 110    Ca    8.3<L>      02-23  Mg     1.80     02-23  Phos  1.5     02-23    TPro  6.2  /  Alb  3.0<L>  /  TBili  0.6  /  DBili  x   /  AST  101<H>  /  ALT  339<H>  /  AlkPhos  140<H>  02-23        A1C with Estimated Average Glucose Result: 6.9 % (02-20-24 @ 00:25)  A1C with Estimated Average Glucose Result: 6.6 % (10-31-23 @ 05:58)

## 2024-02-23 NOTE — PROGRESS NOTE ADULT - SUBJECTIVE AND OBJECTIVE BOX
[facility], Division of Infectious Diseases  CHAYO Melendez Y. Patel, S. Shah, G. Rigoberto  [phone]  ([phone] - weekdays after 5pm and weekends)    Name: NAOMI RUBALCAVA  Age/Gender: 68y Female  MRN: [id number]    Interval History:  Notes reviewed.   No concerning overnight events.  Afebrile.   denies dysuria or diarrhea  feels good  spouse at bedside    Allergies: No Known Allergies      Objective:  Vitals:   T(F): 97.2 (02-23-24 @ 05:47), Max: 97.9 (02-22-24 @ 19:12)  HR: 103 (02-23-24 @ 05:47) (86 - 126)  BP: 131/81 (02-23-24 @ 05:47) (106/59 - 137/115)  RR: 17 (02-23-24 @ 05:47) (17 - 33)  SpO2: 98% (02-23-24 @ 05:47) (96% - 100%)  Physical Examination:  General: no acute distress  HEENT: anicteric  Cardio: S1, S2, normal rate  Resp: breathing comfortably on RA   Abd: soft, NT, ND, no suprapubic tenderness  Ext: no LE edema  Skin: warm, dry    Laboratory Studies:  CBC:                       11.5   9.88  )-----------( 263      ( 23 Feb 2024 05:00 )             36.0     WBC Trend:  9.88 02-23-24 @ 05:00  12.71 02-22-24 @ 00:21  15.00 02-21-24 @ 16:36  18.18 02-21-24 @ 00:40  18.80 02-20-24 @ 18:10  15.32 02-20-24 @ 00:25  9.72 02-19-24 @ 14:43    CMP: 02-23    138  |  102  |  14  ----------------------------<  131<H>  4.0   |  26  |  0.39<L>    Ca    8.3<L>      23 Feb 2024 05:00  Phos  1.5     02-23  Mg     1.80     02-23    TPro  6.2  /  Alb  3.0<L>  /  TBili  0.6  /  DBili  x   /  AST  101<H>  /  ALT  339<H>  /  AlkPhos  140<H>  02-23      LIVER FUNCTIONS - ( 23 Feb 2024 05:00 )  Alb: 3.0 g/dL / Pro: 6.2 g/dL / ALK PHOS: 140 U/L / ALT: 339 U/L / AST: 101 U/L / GGT: x             Urinalysis Basic - ( 23 Feb 2024 05:00 )    Color: x / Appearance: x / SG: x / pH: x  Gluc: 131 mg/dL / Ketone: x  / Bili: x / Urobili: x   Blood: x / Protein: x / Nitrite: x   Leuk Esterase: x / RBC: x / WBC x   Sq Epi: x / Non Sq Epi: x / Bacteria: x      Microbiology: reviewed     Culture - Sputum (collected 02-20-24 @ 12:40)  Source: .Sputum Sputum  Gram Stain (02-20-24 @ 23:17):    Numerous polymorphonuclear leukocytes per low power field    Few Squamous epithelial cells per low power field    Moderate Gram positive cocci in pairs per oil power field    Few Gram Variable Rods per oil power field  Final Report (02-22-24 @ 06:52):    Normal Respiratory Vera present    Culture - Urine (collected 02-20-24 @ 01:10)  Source: Catheterized Catheterized  Final Report (02-21-24 @ 07:26):    <10,000 CFU/mL Normal Urogenital Vera    Culture - Urine (collected 02-19-24 @ 18:18)  Source: Clean Catch Clean Catch (Midstream)  Final Report (02-22-24 @ 09:45):    50,000 - 99,000 CFU/mL Escherichia coli    <10,000 CFU/ml Normal Urogenital vera present  Organism: Escherichia coli (02-22-24 @ 09:45)  Organism: Escherichia coli (02-22-24 @ 09:45)      Method Type: TUNDE      -  Amoxicillin/Clavulanic Acid: S <=8/4      -  Ampicillin: R >16 These ampicillin results predict results for amoxicillin      -  Ampicillin/Sulbactam: I 16/8      -  Aztreonam: S <=4      -  Cefazolin: S <=2 For uncomplicated UTI with K. pneumoniae, E. coli, or P. mirablis: TUNDE <=16 is sensitive and TUNDE >=32 is resistant. This also predicts results for oral agents cefaclor, cefdinir, cefpodoxime, cefprozil, cefuroxime axetil, cephalexin and locarbef for uncomplicated UTI. Note that some isolates may be susceptible to these agents while testing resistant to cefazolin.      -  Cefepime: S <=2      -  Cefoxitin: S <=8      -  Ceftriaxone: S <=1      -  Cefuroxime: S <=4      -  Ciprofloxacin: S <=0.25      -  Ertapenem: S <=0.5      -  Gentamicin: R >8      -  Imipenem: S <=1      -  Levofloxacin: S <=0.5      -  Meropenem: S <=1      -  Nitrofurantoin: S <=32 Should not be used to treat pyelonephritis      -  Piperacillin/Tazobactam: S <=8      -  Tobramycin: R 8      -  Trimethoprim/Sulfamethoxazole: S <=0.5/9.5    Culture - Blood (collected 02-19-24 @ 15:21)  Source: .Blood Blood-Peripheral  Preliminary Report (02-22-24 @ 19:02):    No growth at 72 Hours    Culture - Blood (collected 02-19-24 @ 14:43)  Source: .Blood Blood-Peripheral  Preliminary Report (02-22-24 @ 17:02):    No growth at 72 Hours        Radiology: reviewed     Medications:  cefTRIAXone   IVPB      cefTRIAXone   IVPB 1000 milliGRAM(s) IV Intermittent every 24 hours  dextrose 5%. 1000 milliLiter(s) IV Continuous <Continuous>  dextrose 5%. 1000 milliLiter(s) IV Continuous <Continuous>  dextrose 50% Injectable 25 Gram(s) IV Push once  dextrose 50% Injectable 12.5 Gram(s) IV Push once  dextrose 50% Injectable 25 Gram(s) IV Push once  dextrose Oral Gel 15 Gram(s) Oral once PRN  digoxin     Tablet 125 MICROGram(s) Oral every other day  glucagon  Injectable 1 milliGRAM(s) IntraMuscular once  guaiFENesin Oral Liquid (Sugar-Free) 200 milliGRAM(s) Oral every 6 hours PRN  insulin glargine Injectable (LANTUS) 12 Unit(s) SubCutaneous <User Schedule>  insulin lispro (ADMELOG) corrective regimen sliding scale   SubCutaneous three times a day before meals  insulin lispro (ADMELOG) corrective regimen sliding scale   SubCutaneous at bedtime  insulin lispro Injectable (ADMELOG) 3 Unit(s) SubCutaneous before lunch  insulin lispro Injectable (ADMELOG) 3 Unit(s) SubCutaneous before breakfast  insulin lispro Injectable (ADMELOG) 3 Unit(s) SubCutaneous before dinner  metoprolol succinate  milliGRAM(s) Oral two times a day  potassium phosphate IVPB 30 milliMole(s) IV Intermittent once  sodium chloride 0.65% Nasal 1 Spray(s) Both Nostrils two times a day PRN  warfarin 5 milliGRAM(s) Oral once    Antimicrobials:  cefTRIAXone   IVPB 1000 milliGRAM(s) IV Intermittent every 24 hours  cefTRIAXone   IVPB       no

## 2024-02-23 NOTE — PHYSICAL THERAPY INITIAL EVALUATION ADULT - ADDITIONAL COMMENTS
Patient lives with  and son in private home, 5 steps to enter. patient was independent in all ADL prior to admission. Patient was not using an assistive device prior to admission.

## 2024-02-23 NOTE — PHYSICAL THERAPY INITIAL EVALUATION ADULT - WEIGHT-BEARING RESTRICTIONS: STAND/SIT, REHAB EVAL
[No Gallop] : no gallop [Normal Rate] : normal [Normal S1] : normal S1 [Normal S2] : normal S2 [II] : a grade 2 [No Pitting Edema] : no pitting edema present [2+] : left 2+ [No Abnormalities] : the abdominal aorta was not enlarged and no bruit was heard [Soft] : abdomen soft [Non Tender] : non-tender [Normal Radial B/L] : normal radial B/L [Normal PT B/L] : normal PT B/L [Normal DP B/L] : normal DP B/L [Normal] : no rash, no skin lesions [S3] : no S3 [S4] : no S4 [Right Carotid Bruit] : no bruit heard over the right carotid [Left Carotid Bruit] : no bruit heard over the left carotid [Right Femoral Bruit] : no bruit heard over the right femoral artery [Left Femoral Bruit] : no bruit heard over the left femoral artery full weight-bearing

## 2024-02-23 NOTE — CHART NOTE - NSCHARTNOTEFT_GEN_A_CORE
Source: Patient [X]    Family () [X]     Other (Chart Review) [X]    Patient is seen for nutrition consult for assessment, education    Medical Course: Per chart review, patient is a 68y Female with PMH mitral regurgitation, rheumatic heart disease status post mechanical AVR/MVR, HTN, chronic afib on digoxin + warfarin, DM2 on Jardiance, who was admitted to MICU with septic shock 2' UTI, afib with RVR, and euglycemic DKA    Nutrition Course: Patient is currently ordered for a PO diet and Ensure Max BID supplementation. Patient seen at bedside by writer,  present during encounter. Patient reports a good appetite and PO intake at meals during course of admission. Lunch tray visually observed at bedside, 100% of meal consumed. Patient endorses good intake of Ensure Max supplement, prefers chocolate flavor (service note entered in [facility] regarding preference). Patient denies any chewing or swallowing difficulty with current diet order. No report of GI distress (nausea, vomiting, diarrhea, constipation). Noted HbA1c 6.9% (2/20). Noted provision of warfarin per review of medication list.    Diet : Diet, DASH/TLC:   Sodium & Cholesterol Restricted  Consistent Carbohydrate {Evening Snack} (CSTCHOSN)  Supplement Feeding Modality:  Oral  Ensure Max Cans or Servings Per Day:  1       Frequency:  Two Times a day (02-21-24 @ 20:46)    Anthropometrics  Height: None on file  Weights per RN flowsheets: 52.2kg (2/22), 56.1kg (2/21), 41.9kg (2/20)  BMI: Unable to determine   % Weight Change: +10.3kg (20%) x2 days period of time; likely inaccurate    Pertinent Medications: MEDICATIONS  (STANDING):  cefTRIAXone   IVPB      cefTRIAXone   IVPB 1000 milliGRAM(s) IV Intermittent every 24 hours  dextrose 5%. 1000 milliLiter(s) (100 mL/Hr) IV Continuous <Continuous>  dextrose 5%. 1000 milliLiter(s) (50 mL/Hr) IV Continuous <Continuous>  dextrose 50% Injectable 25 Gram(s) IV Push once  dextrose 50% Injectable 12.5 Gram(s) IV Push once  dextrose 50% Injectable 25 Gram(s) IV Push once  digoxin     Tablet 125 MICROGram(s) Oral every other day  glucagon  Injectable 1 milliGRAM(s) IntraMuscular once  insulin glargine Injectable (LANTUS) 12 Unit(s) SubCutaneous <User Schedule>  insulin lispro (ADMELOG) corrective regimen sliding scale   SubCutaneous three times a day before meals  insulin lispro (ADMELOG) corrective regimen sliding scale   SubCutaneous at bedtime  insulin lispro Injectable (ADMELOG) 3 Unit(s) SubCutaneous before breakfast  insulin lispro Injectable (ADMELOG) 3 Unit(s) SubCutaneous before dinner  insulin lispro Injectable (ADMELOG) 3 Unit(s) SubCutaneous before lunch  metoprolol succinate  milliGRAM(s) Oral two times a day  warfarin 5 milliGRAM(s) Oral once    MEDICATIONS  (PRN):  dextrose Oral Gel 15 Gram(s) Oral once PRN Blood Glucose LESS THAN 70 milliGRAM(s)/deciliter  guaiFENesin Oral Liquid (Sugar-Free) 200 milliGRAM(s) Oral every 6 hours PRN Cough  sodium chloride 0.65% Nasal 1 Spray(s) Both Nostrils two times a day PRN Nasal Congestion    Pertinent Labs:  02-23 Na138 mmol/L Glu 131 mg/dL<H> K+ 4.0 mmol/L Cr  0.39 mg/dL<L> BUN 14 mg/dL 02-23 Phos 1.5 mg/dL<L> 02-23 Alb 3.0 g/dL<L>    Skin: No pressure ulcers/injuries documented per RN flowsheets     Fluid: No edema documented per RN flowsheets     GI: Last BM 2/22/24 per RN flowsheet documentation. Not noted to be on a bowel regimen.     Estimated Needs:   [X] no change since previous assessment  Weight Used: Dosing Weight 93.6lb/42.5kg  Estimated Energy Needs: 1275-1487kcal (based on 30-35kcal/kg)  Estimated Protein Needs: 51-59.5gms (based on 1.2-1.4gms/kg)     Previous Nutrition Diagnosis: [X] Moderate malnutrition, [X] Altered nutrition related labs    Nutrition Diagnosis is [X] ongoing    New Nutrition Diagnosis: Not applicable    Education: [X] Given today    Type of education provided: Patient provided with reinforcing verbal and written education regarding nutrition in the context of diabetes mellitus management. Per their request, printed handouts regarding Carbohydrate Counting for People with Diabetes, Plate Method for Diabetes, and Diabetes Label Reading Tips provided to patient. Encouraged patient to follow-up with an outpatient dietitian for continued nutrition counseling. Patient and  verbalized understanding to the discussion.     Nutrition Recommendations  - Continue current diet order as tolerated  - Continue provision of Ensure Max (provides 150kcal, 30gms protein per serving) BID  - Monitor weights, labs, BM's, skin integrity, p.o. intake.   - Please monitor % PO intake on flowsheets   - RD remains available, please consult as needed    Monitoring and Evaluation:   [X] PO intake [ ] Tolerance to diet prescription [X] weights [X] follow up per protocol    Marga Carrillo MS RDN CDN  On [employer] Teams or Pager #[phone]

## 2024-02-23 NOTE — PROGRESS NOTE ADULT - ATTENDING COMMENTS
patient seen and examined   labs and vitals reviewed  agree with above assessment and plan  pt feeling better  HR improved from yesterday  agree with changing amio to BBs  cont tele  cont supportive care per primary team  will cont to follow with you
personally saw and examined pt  labs and vitals reviewed  agree with above assessment and plan  much improved today as far as symptoms, HR  cont current regimens of toprol and dig  will need outpt cards follow up.   cont tele
personally saw and examined patient  labs and vitals reviewed  agree with above assessment and plan  family at bedside and on phone  remains in afib with rvr  on amio drip  metoprolol to be restarted  suspect that HR will improve as sepsis resolves and more amio is onboard  remains in shock state on pressors, wean as bp allows  if bp drops will need to consider dccv  anticoagulated on coumadin   cont supportive care per micu team  will follow with you  plan for repeat echo when HR improves  remains critically ill

## 2024-02-24 LAB
ALBUMIN SERPL ELPH-MCNC: 3.1 G/DL — LOW (ref 3.3–5)
ALP SERPL-CCNC: 126 U/L — HIGH (ref 40–120)
ALT FLD-CCNC: 246 U/L — HIGH (ref 4–33)
ANION GAP SERPL CALC-SCNC: 9 MMOL/L — SIGNIFICANT CHANGE UP (ref 7–14)
APTT BLD: 35 SEC — SIGNIFICANT CHANGE UP (ref 24.5–35.6)
APTT BLD: 50.9 SEC — HIGH (ref 24.5–35.6)
AST SERPL-CCNC: 61 U/L — HIGH (ref 4–32)
BASOPHILS # BLD AUTO: 0.05 K/UL — SIGNIFICANT CHANGE UP (ref 0–0.2)
BASOPHILS NFR BLD AUTO: 0.4 % — SIGNIFICANT CHANGE UP (ref 0–2)
BILIRUB SERPL-MCNC: 0.5 MG/DL — SIGNIFICANT CHANGE UP (ref 0.2–1.2)
BUN SERPL-MCNC: 15 MG/DL — SIGNIFICANT CHANGE UP (ref 7–23)
CALCIUM SERPL-MCNC: 8.6 MG/DL — SIGNIFICANT CHANGE UP (ref 8.4–10.5)
CHLORIDE SERPL-SCNC: 106 MMOL/L — SIGNIFICANT CHANGE UP (ref 98–107)
CO2 SERPL-SCNC: 27 MMOL/L — SIGNIFICANT CHANGE UP (ref 22–31)
CREAT SERPL-MCNC: 0.44 MG/DL — LOW (ref 0.5–1.3)
CULTURE RESULTS: SIGNIFICANT CHANGE UP
CULTURE RESULTS: SIGNIFICANT CHANGE UP
EGFR: 107 ML/MIN/1.73M2 — SIGNIFICANT CHANGE UP
EOSINOPHIL # BLD AUTO: 0.05 K/UL — SIGNIFICANT CHANGE UP (ref 0–0.5)
EOSINOPHIL NFR BLD AUTO: 0.4 % — SIGNIFICANT CHANGE UP (ref 0–6)
GLUCOSE BLDC GLUCOMTR-MCNC: 153 MG/DL — HIGH (ref 70–99)
GLUCOSE BLDC GLUCOMTR-MCNC: 167 MG/DL — HIGH (ref 70–99)
GLUCOSE BLDC GLUCOMTR-MCNC: 199 MG/DL — HIGH (ref 70–99)
GLUCOSE BLDC GLUCOMTR-MCNC: 70 MG/DL — SIGNIFICANT CHANGE UP (ref 70–99)
GLUCOSE SERPL-MCNC: 68 MG/DL — LOW (ref 70–99)
HCT VFR BLD CALC: 37.4 % — SIGNIFICANT CHANGE UP (ref 34.5–45)
HGB BLD-MCNC: 11.7 G/DL — SIGNIFICANT CHANGE UP (ref 11.5–15.5)
IANC: 8.56 K/UL — HIGH (ref 1.8–7.4)
IMM GRANULOCYTES NFR BLD AUTO: 1.9 % — HIGH (ref 0–0.9)
INR BLD: 1.9 RATIO — HIGH (ref 0.85–1.18)
LYMPHOCYTES # BLD AUTO: 1.16 K/UL — SIGNIFICANT CHANGE UP (ref 1–3.3)
LYMPHOCYTES # BLD AUTO: 10.4 % — LOW (ref 13–44)
MAGNESIUM SERPL-MCNC: 1.8 MG/DL — SIGNIFICANT CHANGE UP (ref 1.6–2.6)
MCHC RBC-ENTMCNC: 28.8 PG — SIGNIFICANT CHANGE UP (ref 27–34)
MCHC RBC-ENTMCNC: 31.3 GM/DL — LOW (ref 32–36)
MCV RBC AUTO: 92.1 FL — SIGNIFICANT CHANGE UP (ref 80–100)
MONOCYTES # BLD AUTO: 1.14 K/UL — HIGH (ref 0–0.9)
MONOCYTES NFR BLD AUTO: 10.2 % — SIGNIFICANT CHANGE UP (ref 2–14)
NEUTROPHILS # BLD AUTO: 8.56 K/UL — HIGH (ref 1.8–7.4)
NEUTROPHILS NFR BLD AUTO: 76.7 % — SIGNIFICANT CHANGE UP (ref 43–77)
NRBC # BLD: 0 /100 WBCS — SIGNIFICANT CHANGE UP (ref 0–0)
NRBC # FLD: 0 K/UL — SIGNIFICANT CHANGE UP (ref 0–0)
PHOSPHATE SERPL-MCNC: 2.4 MG/DL — LOW (ref 2.5–4.5)
PLATELET # BLD AUTO: 317 K/UL — SIGNIFICANT CHANGE UP (ref 150–400)
POTASSIUM SERPL-MCNC: 3.8 MMOL/L — SIGNIFICANT CHANGE UP (ref 3.5–5.3)
POTASSIUM SERPL-SCNC: 3.8 MMOL/L — SIGNIFICANT CHANGE UP (ref 3.5–5.3)
PROT SERPL-MCNC: 5.8 G/DL — LOW (ref 6–8.3)
PROTHROM AB SERPL-ACNC: 20.9 SEC — HIGH (ref 9.5–13)
RBC # BLD: 4.06 M/UL — SIGNIFICANT CHANGE UP (ref 3.8–5.2)
RBC # FLD: 14.6 % — HIGH (ref 10.3–14.5)
SODIUM SERPL-SCNC: 142 MMOL/L — SIGNIFICANT CHANGE UP (ref 135–145)
SPECIMEN SOURCE: SIGNIFICANT CHANGE UP
SPECIMEN SOURCE: SIGNIFICANT CHANGE UP
WBC # BLD: 11.17 K/UL — HIGH (ref 3.8–10.5)
WBC # FLD AUTO: 11.17 K/UL — HIGH (ref 3.8–10.5)

## 2024-02-24 PROCEDURE — 99232 SBSQ HOSP IP/OBS MODERATE 35: CPT

## 2024-02-24 RX ORDER — HEPARIN SODIUM 5000 [USP'U]/ML
INJECTION INTRAVENOUS; SUBCUTANEOUS
Qty: 25000 | Refills: 0 | Status: DISCONTINUED | OUTPATIENT
Start: 2024-02-24 | End: 2024-02-25

## 2024-02-24 RX ORDER — HEPARIN SODIUM 5000 [USP'U]/ML
1500 INJECTION INTRAVENOUS; SUBCUTANEOUS EVERY 6 HOURS
Refills: 0 | Status: DISCONTINUED | OUTPATIENT
Start: 2024-02-24 | End: 2024-02-25

## 2024-02-24 RX ORDER — HEPARIN SODIUM 5000 [USP'U]/ML
3500 INJECTION INTRAVENOUS; SUBCUTANEOUS EVERY 6 HOURS
Refills: 0 | Status: DISCONTINUED | OUTPATIENT
Start: 2024-02-24 | End: 2024-02-25

## 2024-02-24 RX ORDER — INSULIN GLARGINE 100 [IU]/ML
10 INJECTION, SOLUTION SUBCUTANEOUS
Refills: 0 | Status: DISCONTINUED | OUTPATIENT
Start: 2024-02-24 | End: 2024-02-25

## 2024-02-24 RX ORDER — FUROSEMIDE 40 MG
40 TABLET ORAL ONCE
Refills: 0 | Status: COMPLETED | OUTPATIENT
Start: 2024-02-24 | End: 2024-02-24

## 2024-02-24 RX ORDER — WARFARIN SODIUM 2.5 MG/1
5 TABLET ORAL ONCE
Refills: 0 | Status: COMPLETED | OUTPATIENT
Start: 2024-02-24 | End: 2024-02-24

## 2024-02-24 RX ORDER — ACETAMINOPHEN 500 MG
650 TABLET ORAL ONCE
Refills: 0 | Status: COMPLETED | OUTPATIENT
Start: 2024-02-24 | End: 2024-02-24

## 2024-02-24 RX ADMIN — HEPARIN SODIUM 800 UNIT(S)/HR: 5000 INJECTION INTRAVENOUS; SUBCUTANEOUS at 11:22

## 2024-02-24 RX ADMIN — WARFARIN SODIUM 5 MILLIGRAM(S): 2.5 TABLET ORAL at 21:36

## 2024-02-24 RX ADMIN — Medication 3 UNIT(S): at 12:53

## 2024-02-24 RX ADMIN — HEPARIN SODIUM 800 UNIT(S)/HR: 5000 INJECTION INTRAVENOUS; SUBCUTANEOUS at 09:30

## 2024-02-24 RX ADMIN — CEFTRIAXONE 100 MILLIGRAM(S): 500 INJECTION, POWDER, FOR SOLUTION INTRAMUSCULAR; INTRAVENOUS at 09:30

## 2024-02-24 RX ADMIN — INSULIN GLARGINE 10 UNIT(S): 100 INJECTION, SOLUTION SUBCUTANEOUS at 19:24

## 2024-02-24 RX ADMIN — Medication 1: at 17:01

## 2024-02-24 RX ADMIN — Medication 40 MILLIGRAM(S): at 12:53

## 2024-02-24 RX ADMIN — HEPARIN SODIUM 900 UNIT(S)/HR: 5000 INJECTION INTRAVENOUS; SUBCUTANEOUS at 20:50

## 2024-02-24 RX ADMIN — Medication 100 MILLIGRAM(S): at 17:08

## 2024-02-24 RX ADMIN — Medication 100 MILLIGRAM(S): at 06:07

## 2024-02-24 RX ADMIN — Medication 3 UNIT(S): at 17:02

## 2024-02-24 RX ADMIN — HEPARIN SODIUM 900 UNIT(S)/HR: 5000 INJECTION INTRAVENOUS; SUBCUTANEOUS at 19:20

## 2024-02-24 NOTE — PROGRESS NOTE ADULT - SUBJECTIVE AND OBJECTIVE BOX
Diabetes  Follow up note; CC DM2 uncontrolled with hypoglycemia     Interval History/ROS: no nausea, eating meals and nutritional supplement. Patient with hypoglycemia this AM - reports she did feel very hungry at that time. Otherwise, denies complaints at present time.     MEDICATIONS  (STANDING):  cefTRIAXone   IVPB      cefTRIAXone   IVPB 1000 milliGRAM(s) IV Intermittent every 24 hours  dextrose 5%. 1000 milliLiter(s) (100 mL/Hr) IV Continuous <Continuous>  dextrose 5%. 1000 milliLiter(s) (50 mL/Hr) IV Continuous <Continuous>  dextrose 50% Injectable 25 Gram(s) IV Push once  dextrose 50% Injectable 12.5 Gram(s) IV Push once  dextrose 50% Injectable 25 Gram(s) IV Push once  digoxin     Tablet 125 MICROGram(s) Oral every other day  glucagon  Injectable 1 milliGRAM(s) IntraMuscular once  heparin  Infusion.  Unit(s)/Hr (8 mL/Hr) IV Continuous <Continuous>  insulin glargine Injectable (LANTUS) 10 Unit(s) SubCutaneous <User Schedule>  insulin lispro (ADMELOG) corrective regimen sliding scale   SubCutaneous three times a day before meals  insulin lispro (ADMELOG) corrective regimen sliding scale   SubCutaneous at bedtime  insulin lispro Injectable (ADMELOG) 3 Unit(s) SubCutaneous before breakfast  insulin lispro Injectable (ADMELOG) 3 Unit(s) SubCutaneous before dinner  insulin lispro Injectable (ADMELOG) 3 Unit(s) SubCutaneous before lunch  metoprolol succinate  milliGRAM(s) Oral two times a day  warfarin 5 milliGRAM(s) Oral once        No Known Allergies        PHYSICAL EXAM:  Vital Signs Last 24 Hrs  T(C): 36.7 (24 Feb 2024 09:30), Max: 36.7 (23 Feb 2024 20:30)  T(F): 98 (24 Feb 2024 09:30), Max: 98 (23 Feb 2024 20:30)  HR: 94 (24 Feb 2024 09:30) (90 - 94)  BP: 126/63 (24 Feb 2024 09:30) (126/63 - 141/68)  BP(mean): --  RR: 18 (24 Feb 2024 09:30) (18 - 18)  SpO2: 100% (24 Feb 2024 09:30) (99% - 100%)    Parameters below as of 24 Feb 2024 09:30  Patient On (Oxygen Delivery Method): room air    GENERAL: Sitting up in bed in  NAD  EYES: No proptosis,  HEENT:  Atraumatic, Normocephalic,   RESPIRATORY: Non labored respirations   GI: Soft, non distended   Psych: Awake and alert     CAPILLARY BLOOD GLUCOSE      POCT Blood Glucose.: 167 mg/dL (24 Feb 2024 11:14)  POCT Blood Glucose.: 70 mg/dL (24 Feb 2024 07:20)  POCT Blood Glucose.: 161 mg/dL (23 Feb 2024 23:12)  POCT Blood Glucose.: 215 mg/dL (23 Feb 2024 18:30)  POCT Blood Glucose.: 148 mg/dL (23 Feb 2024 16:31)      02-24    142  |  106  |  15  ----------------------------<  68<L>  3.8   |  27  |  0.44<L>    Ca    8.6      24 Feb 2024 05:51  Phos  2.4     02-24  Mg     1.80     02-24    TPro  5.8<L>  /  Alb  3.1<L>  /  TBili  0.5  /  DBili  x   /  AST  61<H>  /  ALT  246<H>  /  AlkPhos  126<H>  02-24        A1C with Estimated Average Glucose Result: 6.9 % (02-20-24 @ 00:25)  A1C with Estimated Average Glucose Result: 6.6 % (10-31-23 @ 05:58)      Diet, DASH/TLC:   Sodium & Cholesterol Restricted  Consistent Carbohydrate Evening Snack (CSTCHOSN)  Supplement Feeding Modality:  Oral  Ensure Max Cans or Servings Per Day:  1       Frequency:  Two Times a day (02-21-24 @ 20:46)

## 2024-02-24 NOTE — PROGRESS NOTE ADULT - ASSESSMENT
67 y/o F PMhx mechanical aortic replacement, rheumatic heart disease, mitral valve repair, atrial fibrillation, HTN, DM 2 on Jardiance who presented w/ URI symptoms and dysuria  found to have septic shock, DKA, UTI, elevated liver enzymes    UTI  septic shock- resolved  dysuria, frequency, urgency on admission  no flank tenderness  urine cx w/ E Coli, sensitivities reviewed    elevated liver enzymes  hepatic viral serologies negative  abd US- suggestive of steatosis. Cholelithiasis. Nonspecific mild mural thickening. Findings likely associated with underlying liver disease.    Recommendations  c/w ceftriaxone 1g daily  complete 7 day course w/ last day 2/25  on discharge transition to cefuroxime 250mg PO bid    Trell Franklin M.D.  [facility], Division of Infectious Diseases  [phone]  After 5pm on weekdays and all day on weekends - please call [phone]

## 2024-02-24 NOTE — PROGRESS NOTE ADULT - ASSESSMENT
68 year old woman with PMH of Mechanical aortic valve, rheumatic heart diesase, afib, HTN, and DM2 admitted with euglycemic DKA and afib with RVR.     1) T2DM with hyperglycemia  #eDKA in setting of infection and afib /w RVR - now resolved  A1c 6.9%  Home regimen - jardiance and janumet  -FSG goal 100-180 mg/dL  - Hypoglycemia on serum labs   - Decreased lantus to 10 units   - continue admelog 3 units before meals  - continue low admelog correction scales before meals and bedtime  Discharge plan:  -follow up with outpatient endocrinologist Dr. Ch  -stop jardiance on discharge given urosepsis and EDKA. can consider resuming in future as outpt  -Likely discharge on janumet 50-1000mg BID, may need additional agent as well depending on blood glucose values.  Lactate now normalized Blood Gas Venous - Lactate: 1.8 mmol/L    2) HTN  -enalapril had been on hold due to hypotension, consider resuming  -Management as per primary team     3) HLD  -atorvastatin being held due to transaminitis, values improving  -Defer management to primary team     JOSEMANUEL Fontana-BC  Nurse Practitioner   Division of Endocrinology  Pager: MICK pager [phone]    If out of hospital/unavailable when paged, please note: patient will be cared for by another provider on the endocrine service.  For urgent concerns: call the endocrine answering service for assistance to reach covering provider ([phone]). For non-urgent matters: please email [email or web address] for assistance.

## 2024-02-24 NOTE — PROGRESS NOTE ADULT - ASSESSMENT
68-yo Female with PMHx of mitral regurgitation, rheumatic heart disease s/p mechanical AVR/MVR, HTN, chronic afib on digoxin + warfarin, DM2 on Jardiance, who was admitted to MICU w/septic shock 2' UTI, afib w/RVR, and euglycemic DKA    Septic shock 2' UTI  - admitted to MICU  - s/p IVF resuscitation  - weaned off pressor support 2/20/24  - blood cxs 2/19/24 --> (-)  - urine cx 2/19/24 --> non-ESBL E coli  - received ceftriaxone/azithromycin x 1 2/19/24  - zosyn 2/20/24 --> 2/22/24  - ceftriaxone 2/22/24 -->   - appreciate MICU care, ID input    Chronic atrial fibrillation with RVR  - s/p amiodarone bolus (2/20)  continue Amiodarone gtt x 24 hours, then PO amio load  - TTE 2/20  LV systolic function is severely decreased with EF 37 % a change from previous echo 11/23 will repeat ECHO today 2/22  - continue warfarin, INR goal 2.5-3.5  - c/w digoxin every other day and lopressor   - appreciate cardiology    Mechanical aortic/mitral valve replacement in setting of rheumatic heart disease  - continue warfarin, INR goal 2.5-3.5; warfarin 5 mg x 1 2/24/24  - heparin gtt until INR is above 2.5    Euglycemic DKA  - A1c 6.9%  - ED: pH 7.27 AG 20, Bicarb 15, Glucose 201, Lactate 4  - s/p 4 L IVF in ED  - transitioned off insulin with 6 units Lantus (2/20)  - basal/bolus insulin dosing as per endocrinology  - continue low admelog correction scales before meals and bedtime  - follow up with outpatient endocrinologist Dr. Ch  - stop jardiance on discharge given urosepsis and EDKA. can consider resuming in future as outpt  - will need to trend lactate closer to discharge to see if metformin can be safely resumed ( had decreased to 1.4, then increased to 2.1), if so then may resume janumet 50-1000mg BID, may need additional agent as well depending on blood glucose values  - appreciate endocrinology    LUISA  - Cr from 11/2023 of 0.39  - admission Cr of 0.89  - LUISA likely pre-renal in the setting of shock  - s/p lasix 40mg IVP (2/21)   - diuretics PRN goal to keep net even  - indwelling catheter placed 2/20, passed TOV 2/22/24  - resolved    HTN  - holding home Enalapril in the setting of hypotension for now     HLD  - atorvastatin 20 mg daily held iso elevated LFT's    Transaminitis   - unknown etiology  - hepatitis panel neg  - RUQ US 2/20 w/findings suggestive of steatosis, cholelithiasis, GB nonspecific mural thickening  - improving    Need for prophylaxis  - daily dosed coumadin         68-yo Female with PMHx of mitral regurgitation, rheumatic heart disease s/p mechanical AVR/MVR, HTN, chronic afib on digoxin + warfarin, DM2 on Jardiance, who was admitted to MICU w/septic shock 2' UTI, afib w/RVR, and euglycemic DKA    Septic shock 2' UTI  - admitted to MICU  - s/p IVF resuscitation  - weaned off pressor support 2/20/24  - blood cxs 2/19/24 --> (-)  - urine cx 2/19/24 --> non-ESBL E coli  - received ceftriaxone/azithromycin x 1 2/19/24  - zosyn 2/20/24 --> 2/22/24  - ceftriaxone 2/22/24 --> 2/25/24  - appreciate MICU care, ID input    Chronic atrial fibrillation with RVR  - s/p amiodarone bolus (2/20)  continue Amiodarone gtt x 24 hours, then PO amio load  - TTE 2/20  LV systolic function is severely decreased with EF 37 % a change from previous echo 11/23 will repeat ECHO today 2/22  - continue warfarin, INR goal 2.5-3.5  - c/w digoxin every other day and lopressor   - appreciate cardiology    Mechanical aortic/mitral valve replacement in setting of rheumatic heart disease  - continue warfarin, INR goal 2.5-3.5; warfarin 5 mg x 1 2/24/24  - heparin gtt until INR is above 2.5    Euglycemic DKA  - A1c 6.9%  - ED: pH 7.27 AG 20, Bicarb 15, Glucose 201, Lactate 4  - s/p 4 L IVF in ED  - transitioned off insulin with 6 units Lantus (2/20)  - basal/bolus insulin dosing as per endocrinology  - continue low admelog correction scales before meals and bedtime  - follow up with outpatient endocrinologist Dr. Ch  - stop jardiance on discharge given urosepsis and EDKA. can consider resuming in future as outpt  - will need to trend lactate closer to discharge to see if metformin can be safely resumed ( had decreased to 1.4, then increased to 2.1), if so then may resume janumet 50-1000mg BID, may need additional agent as well depending on blood glucose values  - appreciate endocrinology    LUISA  - Cr from 11/2023 of 0.39  - admission Cr of 0.89  - LUISA likely pre-renal in the setting of shock  - s/p lasix 40mg IVP (2/21)   - diuretics PRN goal to keep net even  - indwelling catheter placed 2/20, passed TOV 2/22/24  - resolved    HTN  - holding home Enalapril in the setting of hypotension for now     HLD  - atorvastatin 20 mg daily held iso elevated LFT's    Transaminitis   - unknown etiology  - hepatitis panel neg  - RUQ US 2/20 w/findings suggestive of steatosis, cholelithiasis, GB nonspecific mural thickening  - improving    Need for prophylaxis  - daily dosed coumadin         68-yo Female with PMHx of mitral regurgitation, rheumatic heart disease s/p mechanical AVR/MVR, HTN, chronic afib on digoxin + warfarin, DM2 on Jardiance, who was admitted to MICU w/septic shock 2' UTI, afib w/RVR, and euglycemic DKA    Septic shock 2' UTI  - admitted to MICU  - s/p IVF resuscitation  - weaned off pressor support 2/20/24  - blood cxs 2/19/24 --> (-)  - urine cx 2/19/24 --> non-ESBL E coli  - received ceftriaxone/azithromycin x 1 2/19/24  - zosyn 2/20/24 --> 2/22/24  - ceftriaxone 2/22/24 --> 2/25/24  - appreciate MICU care, ID input    Chronic atrial fibrillation with RVR  - s/p amiodarone bolus (2/20) + gtt, initially started on PO amio load but stopped 2/22/24 since no longer needed as per cardiology  - TTE 2/20  LV systolic function is severely decreased with EF 37 % a change from previous echo 11/23 will repeat ECHO today 2/22  - continue warfarin, INR goal 2.5-3.5  - c/w digoxin every other day and lopressor   - appreciate cardiology    Mechanical aortic/mitral valve replacement in setting of rheumatic heart disease  - continue warfarin, INR goal 2.5-3.5; warfarin 5 mg x 1 2/24/24  - heparin gtt until INR is above 2.5    Euglycemic DKA  - A1c 6.9%  - ED: pH 7.27 AG 20, Bicarb 15, Glucose 201, Lactate 4  - s/p 4 L IVF in ED  - transitioned off insulin with 6 units Lantus (2/20)  - basal/bolus insulin dosing as per endocrinology  - continue low admelog correction scales before meals and bedtime  - follow up with outpatient endocrinologist Dr. Ch  - stop jardiance on discharge given urosepsis and EDKA. can consider resuming in future as outpt  - will need to trend lactate closer to discharge to see if metformin can be safely resumed ( had decreased to 1.4, then increased to 2.1), if so then may resume janumet 50-1000mg BID, may need additional agent as well depending on blood glucose values  - appreciate endocrinology    LUISA  - Cr from 11/2023 of 0.39  - admission Cr of 0.89  - LUISA likely pre-renal in the setting of shock  - s/p lasix 40mg IVP (2/21)   - diuretics PRN goal to keep net even  - indwelling catheter placed 2/20, passed TOV 2/22/24  - resolved    HTN  - holding home Enalapril in the setting of hypotension for now     HLD  - atorvastatin 20 mg daily held iso elevated LFT's    Transaminitis   - unknown etiology  - hepatitis panel neg  - RUQ US 2/20 w/findings suggestive of steatosis, cholelithiasis, GB nonspecific mural thickening  - improving    Need for prophylaxis  - daily dosed coumadin         68-yo Female with PMHx of mitral regurgitation, rheumatic heart disease s/p mechanical AVR/MVR, HTN, chronic afib on digoxin + warfarin, DM2 on Jardiance, who was admitted to MICU w/septic shock 2' UTI, afib w/RVR, and euglycemic DKA    Septic shock 2' UTI  - admitted to MICU  - s/p IVF resuscitation  - weaned off pressor support 2/20/24  - blood cxs 2/19/24 --> (-)  - urine cx 2/19/24 --> non-ESBL E coli  - received ceftriaxone/azithromycin x 1 2/19/24  - zosyn 2/20/24 --> 2/22/24  - ceftriaxone 2/22/24 --> 2/25/24  - appreciate MICU care, ID input    Chronic atrial fibrillation with RVR  - s/p amiodarone bolus (2/20) + gtt, initially started on PO amio load but stopped 2/22/24 since no longer needed as per cardiology  - TTE 2/20  LV systolic function is severely decreased with EF 37 % a change from previous echo 11/23  - repeat ECHO 2/22 unchanged  - continue warfarin, INR goal 2.5-3.5  - c/w digoxin every other day and lopressor   - repeat TTE as outpt in 1-2 months  - appreciate cardiology    Mechanical aortic/mitral valve replacement in setting of rheumatic heart disease  - continue warfarin, INR goal 2.5-3.5; warfarin 5 mg x 1 2/24/24  - heparin gtt until INR is above 2.5    Euglycemic DKA  - A1c 6.9%  - ED: pH 7.27 AG 20, Bicarb 15, Glucose 201, Lactate 4  - s/p 4 L IVF in ED  - transitioned off insulin with 6 units Lantus (2/20)  - basal/bolus insulin dosing as per endocrinology  - continue low admelog correction scales before meals and bedtime  - follow up with outpatient endocrinologist Dr. Ch  - stop jardiance on discharge given urosepsis and EDKA. can consider resuming in future as outpt  - will need to trend lactate closer to discharge to see if metformin can be safely resumed ( had decreased to 1.4, then increased to 2.1), if so then may resume janumet 50-1000mg BID, may need additional agent as well depending on blood glucose values  - appreciate endocrinology    LUISA  - Cr from 11/2023 of 0.39  - admission Cr of 0.89  - LUISA likely pre-renal in the setting of shock  - s/p lasix 40mg IVP (2/21)   - diuretics PRN goal to keep net even  - indwelling catheter placed 2/20, passed TOV 2/22/24  - resolved    HTN  - holding home Enalapril in the setting of hypotension for now     HLD  - atorvastatin 20 mg daily held iso elevated LFT's    Transaminitis   - unknown etiology  - hepatitis panel neg  - RUQ US 2/20 w/findings suggestive of steatosis, cholelithiasis, GB nonspecific mural thickening  - improving    Need for prophylaxis  - daily dosed coumadin

## 2024-02-24 NOTE — PROGRESS NOTE ADULT - SUBJECTIVE AND OBJECTIVE BOX
[facility], Division of Infectious Diseases  CHAYO Melendez Y. Patel, S. Shah, G. Rigoberto  [phone]  ([phone] - weekdays after 5pm and weekends)    Name: NAOMI RUBALCAVA  Age/Gender: 68y Female  MRN: [id number]    Interval History:  Notes reviewed.   No concerning overnight events.  Afebrile.   reports noticing leg swelling yesterday after ambulating  spouse at bedside    Allergies: No Known Allergies      Objective:  Vitals:   T(F): 98 (02-23-24 @ 20:30), Max: 98 (02-23-24 @ 20:30)  HR: 90 (02-23-24 @ 20:30) (89 - 94)  BP: 135/84 (02-23-24 @ 20:30) (109/79 - 135/84)  RR: 18 (02-23-24 @ 20:30) (18 - 18)  SpO2: 99% (02-23-24 @ 20:30) (98% - 100%)  Physical Examination:  General: no acute distress  HEENT: anicteric  Cardio: S1, S2, normal rate  Resp: breathing comfortably on RA   Abd: soft, NT, ND  Ext: b/l LE edema  Skin: warm, dry    Laboratory Studies:  CBC:                       11.5   9.88  )-----------( 263      ( 23 Feb 2024 05:00 )             36.0     WBC Trend:  9.88 02-23-24 @ 05:00  12.71 02-22-24 @ 00:21  15.00 02-21-24 @ 16:36  18.18 02-21-24 @ 00:40  18.80 02-20-24 @ 18:10  15.32 02-20-24 @ 00:25  9.72 02-19-24 @ 14:43    CMP: 02-23    138  |  102  |  14  ----------------------------<  131<H>  4.0   |  26  |  0.39<L>    Ca    8.3<L>      23 Feb 2024 05:00  Phos  1.5     02-23  Mg     1.80     02-23    TPro  6.2  /  Alb  3.0<L>  /  TBili  0.6  /  DBili  x   /  AST  101<H>  /  ALT  339<H>  /  AlkPhos  140<H>  02-23      LIVER FUNCTIONS - ( 23 Feb 2024 05:00 )  Alb: 3.0 g/dL / Pro: 6.2 g/dL / ALK PHOS: 140 U/L / ALT: 339 U/L / AST: 101 U/L / GGT: x             Urinalysis Basic - ( 23 Feb 2024 05:00 )    Color: x / Appearance: x / SG: x / pH: x  Gluc: 131 mg/dL / Ketone: x  / Bili: x / Urobili: x   Blood: x / Protein: x / Nitrite: x   Leuk Esterase: x / RBC: x / WBC x   Sq Epi: x / Non Sq Epi: x / Bacteria: x      Microbiology: reviewed     Culture - Sputum (collected 02-20-24 @ 12:40)  Source: .Sputum Sputum  Gram Stain (02-20-24 @ 23:17):    Numerous polymorphonuclear leukocytes per low power field    Few Squamous epithelial cells per low power field    Moderate Gram positive cocci in pairs per oil power field    Few Gram Variable Rods per oil power field  Final Report (02-22-24 @ 06:52):    Normal Respiratory Vera present    Culture - Urine (collected 02-20-24 @ 01:10)  Source: Catheterized Catheterized  Final Report (02-21-24 @ 07:26):    <10,000 CFU/mL Normal Urogenital Vera    Culture - Urine (collected 02-19-24 @ 18:18)  Source: Clean Catch Clean Catch (Midstream)  Final Report (02-22-24 @ 09:45):    50,000 - 99,000 CFU/mL Escherichia coli    <10,000 CFU/ml Normal Urogenital vera present  Organism: Escherichia coli (02-22-24 @ 09:45)  Organism: Escherichia coli (02-22-24 @ 09:45)      Method Type: TUNDE      -  Amoxicillin/Clavulanic Acid: S <=8/4      -  Ampicillin: R >16 These ampicillin results predict results for amoxicillin      -  Ampicillin/Sulbactam: I 16/8      -  Aztreonam: S <=4      -  Cefazolin: S <=2 For uncomplicated UTI with K. pneumoniae, E. coli, or P. mirablis: TUNDE <=16 is sensitive and TUNDE >=32 is resistant. This also predicts results for oral agents cefaclor, cefdinir, cefpodoxime, cefprozil, cefuroxime axetil, cephalexin and locarbef for uncomplicated UTI. Note that some isolates may be susceptible to these agents while testing resistant to cefazolin.      -  Cefepime: S <=2      -  Cefoxitin: S <=8      -  Ceftriaxone: S <=1      -  Cefuroxime: S <=4      -  Ciprofloxacin: S <=0.25      -  Ertapenem: S <=0.5      -  Gentamicin: R >8      -  Imipenem: S <=1      -  Levofloxacin: S <=0.5      -  Meropenem: S <=1      -  Nitrofurantoin: S <=32 Should not be used to treat pyelonephritis      -  Piperacillin/Tazobactam: S <=8      -  Tobramycin: R 8      -  Trimethoprim/Sulfamethoxazole: S <=0.5/9.5    Culture - Blood (collected 02-19-24 @ 15:21)  Source: .Blood Blood-Peripheral  Preliminary Report (02-23-24 @ 19:01):    No growth at 4 days    Culture - Blood (collected 02-19-24 @ 14:43)  Source: .Blood Blood-Peripheral  Preliminary Report (02-23-24 @ 17:00):    No growth at 4 days        Radiology: reviewed     Medications:  cefTRIAXone   IVPB      cefTRIAXone   IVPB 1000 milliGRAM(s) IV Intermittent every 24 hours  dextrose 5%. 1000 milliLiter(s) IV Continuous <Continuous>  dextrose 5%. 1000 milliLiter(s) IV Continuous <Continuous>  dextrose 50% Injectable 25 Gram(s) IV Push once  dextrose 50% Injectable 12.5 Gram(s) IV Push once  dextrose 50% Injectable 25 Gram(s) IV Push once  dextrose Oral Gel 15 Gram(s) Oral once PRN  digoxin     Tablet 125 MICROGram(s) Oral every other day  glucagon  Injectable 1 milliGRAM(s) IntraMuscular once  guaiFENesin Oral Liquid (Sugar-Free) 200 milliGRAM(s) Oral every 6 hours PRN  insulin glargine Injectable (LANTUS) 12 Unit(s) SubCutaneous <User Schedule>  insulin lispro (ADMELOG) corrective regimen sliding scale   SubCutaneous three times a day before meals  insulin lispro (ADMELOG) corrective regimen sliding scale   SubCutaneous at bedtime  insulin lispro Injectable (ADMELOG) 3 Unit(s) SubCutaneous before breakfast  insulin lispro Injectable (ADMELOG) 3 Unit(s) SubCutaneous before dinner  insulin lispro Injectable (ADMELOG) 3 Unit(s) SubCutaneous before lunch  metoprolol succinate  milliGRAM(s) Oral two times a day  sodium chloride 0.65% Nasal 1 Spray(s) Both Nostrils two times a day PRN    Antimicrobials:  cefTRIAXone   IVPB      cefTRIAXone   IVPB 1000 milliGRAM(s) IV Intermittent every 24 hours

## 2024-02-24 NOTE — PROGRESS NOTE ADULT - SUBJECTIVE AND OBJECTIVE BOX
No acute CP or SOB  Remains rate-controlled    Vital Signs Last 24 Hrs  T(C): 36.7 (24 Feb 2024 09:30), Max: 36.7 (23 Feb 2024 20:30)  T(F): 98 (24 Feb 2024 09:30), Max: 98 (23 Feb 2024 20:30)  HR: 94 (24 Feb 2024 09:30) (90 - 94)  BP: 126/63 (24 Feb 2024 09:30) (126/63 - 141/68)  BP(mean): --  RR: 18 (24 Feb 2024 09:30) (18 - 18)  SpO2: 100% (24 Feb 2024 09:30) (99% - 100%)    I&O's Summary      GENERAL: NAD  HEAD:  Atraumatic, Normocephalic  EYES: EOMI, PERRLA, conjunctiva and sclera clear  NECK: Supple, No JVD  CHEST/LUNG: Clear to auscultation bilaterally; Normal respiratory effort w/o intercostal retractions  HEART: Irregular rhythm, tachycardic; nl S1/S2; No murmurs, rubs, or gallops  ABDOMEN: Soft, Nontender, Nondistended; Bowel sounds present; No hepatosplenomegaly  EXTREMITIES:  2+ Peripheral Pulses; (+)pitting edema rt UE, both distal LEs; no erythema; Nl ROM  NEURO: A+O x 3; nonfocal CN/motor/sensory/reflexes; speech + comprehension are intact  PSYCH: Nl affect; no delirium or agitation; no suicidal/homicidal ideation    LABS:                        11.7   11.17 )-----------( 317      ( 24 Feb 2024 05:51 )             37.4     02-24    142  |  106  |  15  ----------------------------<  68<L>  3.8   |  27  |  0.44<L>    Ca    8.6      24 Feb 2024 05:51  Phos  2.4     02-24  Mg     1.80     02-24    TPro  5.8<L>  /  Alb  3.1<L>  /  TBili  0.5  /  DBili  x   /  AST  61<H>  /  ALT  246<H>  /  AlkPhos  126<H>  02-24    PT/INR - ( 24 Feb 2024 05:51 )   PT: 20.9 sec;   INR: 1.90 ratio         PTT - ( 24 Feb 2024 05:51 )  PTT:35.0 sec  CAPILLARY BLOOD GLUCOSE      POCT Blood Glucose.: 70 mg/dL (24 Feb 2024 07:20)  POCT Blood Glucose.: 161 mg/dL (23 Feb 2024 23:12)  POCT Blood Glucose.: 215 mg/dL (23 Feb 2024 18:30)  POCT Blood Glucose.: 148 mg/dL (23 Feb 2024 16:31)  POCT Blood Glucose.: 172 mg/dL (23 Feb 2024 11:17)        Urinalysis Basic - ( 24 Feb 2024 05:51 )    Color: x / Appearance: x / SG: x / pH: x  Gluc: 68 mg/dL / Ketone: x  / Bili: x / Urobili: x   Blood: x / Protein: x / Nitrite: x   Leuk Esterase: x / RBC: x / WBC x   Sq Epi: x / Non Sq Epi: x / Bacteria: x        RADIOLOGY & ADDITIONAL TESTS:    Imaging Personally Reviewed:  [x] YES  [ ] NO    Case discussed with NPP:  [X] YES  [ ] NO

## 2024-02-25 LAB
ALBUMIN SERPL ELPH-MCNC: 2.9 G/DL — LOW (ref 3.3–5)
ALP SERPL-CCNC: 112 U/L — SIGNIFICANT CHANGE UP (ref 40–120)
ALT FLD-CCNC: 170 U/L — HIGH (ref 4–33)
ANION GAP SERPL CALC-SCNC: 8 MMOL/L — SIGNIFICANT CHANGE UP (ref 7–14)
APTT BLD: 82.1 SEC — HIGH (ref 24.5–35.6)
APTT BLD: 93.7 SEC — HIGH (ref 24.5–35.6)
AST SERPL-CCNC: 32 U/L — SIGNIFICANT CHANGE UP (ref 4–32)
BASOPHILS # BLD AUTO: 0.07 K/UL — SIGNIFICANT CHANGE UP (ref 0–0.2)
BASOPHILS NFR BLD AUTO: 0.8 % — SIGNIFICANT CHANGE UP (ref 0–2)
BILIRUB SERPL-MCNC: 0.5 MG/DL — SIGNIFICANT CHANGE UP (ref 0.2–1.2)
BUN SERPL-MCNC: 24 MG/DL — HIGH (ref 7–23)
CALCIUM SERPL-MCNC: 8.9 MG/DL — SIGNIFICANT CHANGE UP (ref 8.4–10.5)
CHLORIDE SERPL-SCNC: 105 MMOL/L — SIGNIFICANT CHANGE UP (ref 98–107)
CO2 SERPL-SCNC: 28 MMOL/L — SIGNIFICANT CHANGE UP (ref 22–31)
CREAT SERPL-MCNC: 0.44 MG/DL — LOW (ref 0.5–1.3)
EGFR: 107 ML/MIN/1.73M2 — SIGNIFICANT CHANGE UP
EOSINOPHIL # BLD AUTO: 0.11 K/UL — SIGNIFICANT CHANGE UP (ref 0–0.5)
EOSINOPHIL NFR BLD AUTO: 1.3 % — SIGNIFICANT CHANGE UP (ref 0–6)
GLUCOSE BLDC GLUCOMTR-MCNC: 190 MG/DL — HIGH (ref 70–99)
GLUCOSE BLDC GLUCOMTR-MCNC: 194 MG/DL — HIGH (ref 70–99)
GLUCOSE BLDC GLUCOMTR-MCNC: 200 MG/DL — HIGH (ref 70–99)
GLUCOSE BLDC GLUCOMTR-MCNC: 77 MG/DL — SIGNIFICANT CHANGE UP (ref 70–99)
GLUCOSE SERPL-MCNC: 69 MG/DL — LOW (ref 70–99)
HCT VFR BLD CALC: 35 % — SIGNIFICANT CHANGE UP (ref 34.5–45)
HGB BLD-MCNC: 11.2 G/DL — LOW (ref 11.5–15.5)
IANC: 6.13 K/UL — SIGNIFICANT CHANGE UP (ref 1.8–7.4)
IMM GRANULOCYTES NFR BLD AUTO: 2.1 % — HIGH (ref 0–0.9)
INR BLD: 3.5 RATIO — HIGH (ref 0.85–1.18)
LACTATE SERPL-SCNC: 0.8 MMOL/L — SIGNIFICANT CHANGE UP (ref 0.5–2)
LYMPHOCYTES # BLD AUTO: 1.29 K/UL — SIGNIFICANT CHANGE UP (ref 1–3.3)
LYMPHOCYTES # BLD AUTO: 14.8 % — SIGNIFICANT CHANGE UP (ref 13–44)
MAGNESIUM SERPL-MCNC: 1.8 MG/DL — SIGNIFICANT CHANGE UP (ref 1.6–2.6)
MCHC RBC-ENTMCNC: 28.8 PG — SIGNIFICANT CHANGE UP (ref 27–34)
MCHC RBC-ENTMCNC: 32 GM/DL — SIGNIFICANT CHANGE UP (ref 32–36)
MCV RBC AUTO: 90 FL — SIGNIFICANT CHANGE UP (ref 80–100)
MONOCYTES # BLD AUTO: 0.96 K/UL — HIGH (ref 0–0.9)
MONOCYTES NFR BLD AUTO: 11 % — SIGNIFICANT CHANGE UP (ref 2–14)
NEUTROPHILS # BLD AUTO: 6.13 K/UL — SIGNIFICANT CHANGE UP (ref 1.8–7.4)
NEUTROPHILS NFR BLD AUTO: 70 % — SIGNIFICANT CHANGE UP (ref 43–77)
NRBC # BLD: 0 /100 WBCS — SIGNIFICANT CHANGE UP (ref 0–0)
NRBC # FLD: 0 K/UL — SIGNIFICANT CHANGE UP (ref 0–0)
PHOSPHATE SERPL-MCNC: 3.1 MG/DL — SIGNIFICANT CHANGE UP (ref 2.5–4.5)
PLATELET # BLD AUTO: 316 K/UL — SIGNIFICANT CHANGE UP (ref 150–400)
POTASSIUM SERPL-MCNC: 3.6 MMOL/L — SIGNIFICANT CHANGE UP (ref 3.5–5.3)
POTASSIUM SERPL-SCNC: 3.6 MMOL/L — SIGNIFICANT CHANGE UP (ref 3.5–5.3)
PROT SERPL-MCNC: 5.6 G/DL — LOW (ref 6–8.3)
PROTHROM AB SERPL-ACNC: 38.1 SEC — HIGH (ref 9.5–13)
RBC # BLD: 3.89 M/UL — SIGNIFICANT CHANGE UP (ref 3.8–5.2)
RBC # FLD: 14.8 % — HIGH (ref 10.3–14.5)
SODIUM SERPL-SCNC: 141 MMOL/L — SIGNIFICANT CHANGE UP (ref 135–145)
WBC # BLD: 8.74 K/UL — SIGNIFICANT CHANGE UP (ref 3.8–10.5)
WBC # FLD AUTO: 8.74 K/UL — SIGNIFICANT CHANGE UP (ref 3.8–10.5)

## 2024-02-25 PROCEDURE — 93308 TTE F-UP OR LMTD: CPT | Mod: 26

## 2024-02-25 PROCEDURE — 99232 SBSQ HOSP IP/OBS MODERATE 35: CPT

## 2024-02-25 PROCEDURE — 76604 US EXAM CHEST: CPT | Mod: 26

## 2024-02-25 RX ORDER — INSULIN GLARGINE 100 [IU]/ML
7 INJECTION, SOLUTION SUBCUTANEOUS
Refills: 0 | Status: DISCONTINUED | OUTPATIENT
Start: 2024-02-25 | End: 2024-02-28

## 2024-02-25 RX ORDER — WARFARIN SODIUM 2.5 MG/1
2.5 TABLET ORAL ONCE
Refills: 0 | Status: COMPLETED | OUTPATIENT
Start: 2024-02-25 | End: 2024-02-25

## 2024-02-25 RX ADMIN — Medication 100 MILLIGRAM(S): at 18:57

## 2024-02-25 RX ADMIN — INSULIN GLARGINE 7 UNIT(S): 100 INJECTION, SOLUTION SUBCUTANEOUS at 18:40

## 2024-02-25 RX ADMIN — Medication 1: at 16:42

## 2024-02-25 RX ADMIN — HEPARIN SODIUM 900 UNIT(S)/HR: 5000 INJECTION INTRAVENOUS; SUBCUTANEOUS at 08:31

## 2024-02-25 RX ADMIN — Medication 3 UNIT(S): at 07:33

## 2024-02-25 RX ADMIN — Medication 125 MICROGRAM(S): at 11:56

## 2024-02-25 RX ADMIN — Medication 100 MILLIGRAM(S): at 06:53

## 2024-02-25 RX ADMIN — HEPARIN SODIUM 900 UNIT(S)/HR: 5000 INJECTION INTRAVENOUS; SUBCUTANEOUS at 03:17

## 2024-02-25 RX ADMIN — Medication 1: at 11:56

## 2024-02-25 RX ADMIN — Medication 3 UNIT(S): at 11:57

## 2024-02-25 RX ADMIN — Medication 3 UNIT(S): at 16:42

## 2024-02-25 RX ADMIN — WARFARIN SODIUM 2.5 MILLIGRAM(S): 2.5 TABLET ORAL at 22:02

## 2024-02-25 RX ADMIN — CEFTRIAXONE 100 MILLIGRAM(S): 500 INJECTION, POWDER, FOR SOLUTION INTRAMUSCULAR; INTRAVENOUS at 09:05

## 2024-02-25 NOTE — PROGRESS NOTE ADULT - ASSESSMENT
67 y/o F PMhx mechanical aortic replacement, rheumatic heart disease, mitral valve repair, atrial fibrillation, HTN, DM 2 on Jardiance who presented w/ URI symptoms and dysuria  found to have septic shock, DKA, UTI, elevated liver enzymes    UTI  septic shock- resolved  dysuria, frequency, urgency on admission  no flank tenderness  urine cx w/ E Coli, sensitivities reviewed    elevated liver enzymes- improving  hepatic viral serologies negative  abd US- suggestive of steatosis. Cholelithiasis. Nonspecific mild mural thickening. Findings likely associated with underlying liver disease.    Recommendations  c/w ceftriaxone 1g daily to 7 day course of antibiotics w/ last day today 2/25    Trell Franklin M.D.  [facility], Division of Infectious Diseases  [phone]  After 5pm on weekdays and all day on weekends - please call [phone]

## 2024-02-25 NOTE — PROGRESS NOTE ADULT - SUBJECTIVE AND OBJECTIVE BOX
Diabetes  Follow up note; CC DM2 uncontrolled with hypoglycemia     Interval History/ROS: no nausea, eating meals and nutritional supplement. Patient with hypoglycemia this AM --> Lantus was decreased yesterday due to AM hypoglycemia yesterday as well     MEDICATIONS  (STANDING):  dextrose 5%. 1000 milliLiter(s) (100 mL/Hr) IV Continuous <Continuous>  dextrose 5%. 1000 milliLiter(s) (50 mL/Hr) IV Continuous <Continuous>  dextrose 50% Injectable 25 Gram(s) IV Push once  dextrose 50% Injectable 12.5 Gram(s) IV Push once  dextrose 50% Injectable 25 Gram(s) IV Push once  digoxin     Tablet 125 MICROGram(s) Oral every other day  glucagon  Injectable 1 milliGRAM(s) IntraMuscular once  insulin glargine Injectable (LANTUS) 7 Unit(s) SubCutaneous <User Schedule>  insulin lispro (ADMELOG) corrective regimen sliding scale   SubCutaneous three times a day before meals  insulin lispro (ADMELOG) corrective regimen sliding scale   SubCutaneous at bedtime  insulin lispro Injectable (ADMELOG) 3 Unit(s) SubCutaneous before lunch  insulin lispro Injectable (ADMELOG) 3 Unit(s) SubCutaneous before breakfast  insulin lispro Injectable (ADMELOG) 3 Unit(s) SubCutaneous before dinner  metoprolol succinate  milliGRAM(s) Oral two times a day        No Known Allergies        PHYSICAL EXAM:  Vital Signs Last 24 Hrs  T(C): 36.2 (25 Feb 2024 09:00), Max: 36.8 (24 Feb 2024 20:30)  T(F): 97.1 (25 Feb 2024 09:00), Max: 98.2 (24 Feb 2024 20:30)  HR: 87 (25 Feb 2024 09:00) (77 - 100)  BP: 116/58 (25 Feb 2024 09:00) (116/58 - 140/63)  BP(mean): --  RR: 18 (25 Feb 2024 09:00) (18 - 18)  SpO2: 99% (25 Feb 2024 09:00) (98% - 99%)    Parameters below as of 25 Feb 2024 09:00  Patient On (Oxygen Delivery Method): room air        GENERAL: Sitting up in bed in  NAD  EYES: No proptosis,  HEENT:  Atraumatic, Normocephalic,   RESPIRATORY: Non labored respirations   GI: Soft, non distended   Psych: Awake and alert     CAPILLARY BLOOD GLUCOSE      POCT Blood Glucose.: 194 mg/dL (25 Feb 2024 11:13)  POCT Blood Glucose.: 77 mg/dL (25 Feb 2024 07:20)  POCT Blood Glucose.: 199 mg/dL (24 Feb 2024 21:05)  POCT Blood Glucose.: 153 mg/dL (24 Feb 2024 16:35)        02-25    141  |  105  |  24<H>  ----------------------------<  69<L>  3.6   |  28  |  0.44<L>    Ca    8.9      25 Feb 2024 05:25  Phos  3.1     02-25  Mg     1.80     02-25    TPro  5.6<L>  /  Alb  2.9<L>  /  TBili  0.5  /  DBili  x   /  AST  32  /  ALT  170<H>  /  AlkPhos  112  02-25      Thyroid Stimulating Hormone, Serum: 2.50 uIU/mL (02-19-24 @ 18:53)  Thyroid Stimulating Hormone, Serum: 2.66 uIU/mL (02-19-24 @ 16:45)        A1C with Estimated Average Glucose Result: 6.9 % (02-20-24 @ 00:25)  A1C with Estimated Average Glucose Result: 6.6 % (10-31-23 @ 05:58)      Diet, DASH/TLC:   Sodium & Cholesterol Restricted  Consistent Carbohydrate Evening Snack (CSTCHOSN)  Supplement Feeding Modality:  Oral  Ensure Max Cans or Servings Per Day:  1       Frequency:  Two Times a day (02-21-24 @ 20:46)

## 2024-02-25 NOTE — PROGRESS NOTE ADULT - SUBJECTIVE AND OBJECTIVE BOX
[facility], Division of Infectious Diseases  CHAYO Melendez Y. Patel, S. Shah, G. Rigoberto  [phone]  ([phone] - weekdays after 5pm and weekends)    Name: NAOMI RUBALCAVA  Age/Gender: 68y Female  MRN: [id number]    Interval History:  Notes reviewed.   No concerning overnight events.  Afebrile.   feels good  denies dysuria, flank pain, diarrhea  spouse at bedside    Allergies: No Known Allergies      Objective:  Vitals:   T(F): 98.2 (02-24-24 @ 20:30), Max: 98.2 (02-24-24 @ 20:30)  HR: 92 (02-24-24 @ 20:30) (92 - 100)  BP: 140/63 (02-24-24 @ 20:30) (126/63 - 140/63)  RR: 18 (02-24-24 @ 20:30) (18 - 18)  SpO2: 99% (02-24-24 @ 20:30) (98% - 100%)  Physical Examination:  General: no acute distress  HEENT: anicteric  Cardio: S1, S2, normal rate  Resp: breathing comfortably on RA   Abd: soft, NT, ND  Ext: LE edema  Skin: warm, dry    Laboratory Studies:  CBC:                       11.7   11.17 )-----------( 317      ( 24 Feb 2024 05:51 )             37.4     WBC Trend:  11.17 02-24-24 @ 05:51  9.88 02-23-24 @ 05:00  12.71 02-22-24 @ 00:21  15.00 02-21-24 @ 16:36  18.18 02-21-24 @ 00:40  18.80 02-20-24 @ 18:10  15.32 02-20-24 @ 00:25  9.72 02-19-24 @ 14:43    CMP: 02-24    142  |  106  |  15  ----------------------------<  68<L>  3.8   |  27  |  0.44<L>    Ca    8.6      24 Feb 2024 05:51  Phos  2.4     02-24  Mg     1.80     02-24    TPro  5.8<L>  /  Alb  3.1<L>  /  TBili  0.5  /  DBili  x   /  AST  61<H>  /  ALT  246<H>  /  AlkPhos  126<H>  02-24      LIVER FUNCTIONS - ( 24 Feb 2024 05:51 )  Alb: 3.1 g/dL / Pro: 5.8 g/dL / ALK PHOS: 126 U/L / ALT: 246 U/L / AST: 61 U/L / GGT: x             Urinalysis Basic - ( 24 Feb 2024 05:51 )    Color: x / Appearance: x / SG: x / pH: x  Gluc: 68 mg/dL / Ketone: x  / Bili: x / Urobili: x   Blood: x / Protein: x / Nitrite: x   Leuk Esterase: x / RBC: x / WBC x   Sq Epi: x / Non Sq Epi: x / Bacteria: x      Microbiology: reviewed     Culture - Sputum (collected 02-20-24 @ 12:40)  Source: .Sputum Sputum  Gram Stain (02-20-24 @ 23:17):    Numerous polymorphonuclear leukocytes per low power field    Few Squamous epithelial cells per low power field    Moderate Gram positive cocci in pairs per oil power field    Few Gram Variable Rods per oil power field  Final Report (02-22-24 @ 06:52):    Normal Respiratory Vera present    Culture - Urine (collected 02-20-24 @ 01:10)  Source: Catheterized Catheterized  Final Report (02-21-24 @ 07:26):    <10,000 CFU/mL Normal Urogenital Vera    Culture - Urine (collected 02-19-24 @ 18:18)  Source: Clean Catch Clean Catch (Midstream)  Final Report (02-22-24 @ 09:45):    50,000 - 99,000 CFU/mL Escherichia coli    <10,000 CFU/ml Normal Urogenital vera present  Organism: Escherichia coli (02-22-24 @ 09:45)  Organism: Escherichia coli (02-22-24 @ 09:45)      -  Levofloxacin: S <=0.5      -  Tobramycin: R 8      -  Nitrofurantoin: S <=32 Should not be used to treat pyelonephritis      -  Aztreonam: S <=4      -  Gentamicin: R >8      -  Cefazolin: S <=2 For uncomplicated UTI with K. pneumoniae, E. coli, or P. mirablis: TUNDE <=16 is sensitive and TUNDE >=32 is resistant. This also predicts results for oral agents cefaclor, cefdinir, cefpodoxime, cefprozil, cefuroxime axetil, cephalexin and locarbef for uncomplicated UTI. Note that some isolates may be susceptible to these agents while testing resistant to cefazolin.      -  Cefepime: S <=2      -  Piperacillin/Tazobactam: S <=8      -  Ciprofloxacin: S <=0.25      -  Imipenem: S <=1      -  Ceftriaxone: S <=1      -  Ampicillin: R >16 These ampicillin results predict results for amoxicillin      Method Type: TUNDE      -  Meropenem: S <=1      -  Ampicillin/Sulbactam: I 16/8      -  Cefoxitin: S <=8      -  Cefuroxime: S <=4      -  Amoxicillin/Clavulanic Acid: S <=8/4      -  Trimethoprim/Sulfamethoxazole: S <=0.5/9.5      -  Ertapenem: S <=0.5    Culture - Blood (collected 02-19-24 @ 15:21)  Source: .Blood Blood-Peripheral  Final Report (02-24-24 @ 19:01):    No growth at 5 days    Culture - Blood (collected 02-19-24 @ 14:43)  Source: .Blood Blood-Peripheral  Final Report (02-24-24 @ 17:01):    No growth at 5 days        Radiology: reviewed     Medications:  cefTRIAXone   IVPB      cefTRIAXone   IVPB 1000 milliGRAM(s) IV Intermittent every 24 hours  dextrose 5%. 1000 milliLiter(s) IV Continuous <Continuous>  dextrose 5%. 1000 milliLiter(s) IV Continuous <Continuous>  dextrose 50% Injectable 12.5 Gram(s) IV Push once  dextrose 50% Injectable 25 Gram(s) IV Push once  dextrose 50% Injectable 25 Gram(s) IV Push once  dextrose Oral Gel 15 Gram(s) Oral once PRN  digoxin     Tablet 125 MICROGram(s) Oral every other day  glucagon  Injectable 1 milliGRAM(s) IntraMuscular once  guaiFENesin Oral Liquid (Sugar-Free) 200 milliGRAM(s) Oral every 6 hours PRN  heparin   Injectable 3500 Unit(s) IV Push every 6 hours PRN  heparin   Injectable 1500 Unit(s) IV Push every 6 hours PRN  heparin  Infusion.  Unit(s)/Hr IV Continuous <Continuous>  insulin glargine Injectable (LANTUS) 10 Unit(s) SubCutaneous <User Schedule>  insulin lispro (ADMELOG) corrective regimen sliding scale   SubCutaneous three times a day before meals  insulin lispro (ADMELOG) corrective regimen sliding scale   SubCutaneous at bedtime  insulin lispro Injectable (ADMELOG) 3 Unit(s) SubCutaneous before lunch  insulin lispro Injectable (ADMELOG) 3 Unit(s) SubCutaneous before breakfast  insulin lispro Injectable (ADMELOG) 3 Unit(s) SubCutaneous before dinner  metoprolol succinate  milliGRAM(s) Oral two times a day  sodium chloride 0.65% Nasal 1 Spray(s) Both Nostrils two times a day PRN    Antimicrobials:  cefTRIAXone   IVPB      cefTRIAXone   IVPB 1000 milliGRAM(s) IV Intermittent every 24 hours

## 2024-02-25 NOTE — PROGRESS NOTE ADULT - ASSESSMENT
68 year old woman with PMH of Mechanical aortic valve, rheumatic heart diesase, afib, HTN, and DM2 admitted with euglycemic DKA and afib with RVR.     1) T2DM with hyperglycemia  #eDKA in setting of infection and afib /w RVR - now resolved  A1c 6.9%  Home regimen - jardiance and janumet  -FSG goal 100-180 mg/dL  - Hypoglycemia on serum labs   - Decreased lantus to 7 units   - continue admelog 3 units before meals  - continue low admelog correction scales before meals and bedtime  Discharge plan:  -follow up with outpatient endocrinologist Dr. Ch  -stop jardiance on discharge given urosepsis and EDKA. can consider resuming in future as outpt  -EF noted to be 30-15% - Possibly discharge on janumet 50-1000mg BID, but may need to  pending EF   May need additional agent as well depending on blood glucose values.  Lactate now normalized Blood Gas Venous - Lactate: 1.8 mmol/L    2) HTN  -Management as per cardiology     3) HLD  -atorvastatin being held due to transaminitis, values improving  -Defer management to primary team     JOSEMANUEL Fontana-BC  Nurse Practitioner   Division of Endocrinology  Pager: MICK pager [phone]    If out of hospital/unavailable when paged, please note: patient will be cared for by another provider on the endocrine service.  For urgent concerns: call the endocrine answering service for assistance to reach covering provider ([phone]). For non-urgent matters: please email [email or web address] for assistance.

## 2024-02-25 NOTE — PROGRESS NOTE ADULT - SUBJECTIVE AND OBJECTIVE BOX
Ankle edema improved s/p lasix IVP yesterday  No palpitations, CP or SOB  (+)rt forearm mildly tender patch of erythema close to former PIV site    Vital Signs Last 24 Hrs  T(C): 36.2 (25 Feb 2024 09:00), Max: 36.8 (24 Feb 2024 20:30)  T(F): 97.1 (25 Feb 2024 09:00), Max: 98.2 (24 Feb 2024 20:30)  HR: 87 (25 Feb 2024 09:00) (77 - 100)  BP: 116/58 (25 Feb 2024 09:00) (116/58 - 140/63)  BP(mean): --  RR: 18 (25 Feb 2024 09:00) (18 - 18)  SpO2: 99% (25 Feb 2024 09:00) (98% - 99%)    I&O's Summary        GENERAL: NAD  HEAD:  Atraumatic, Normocephalic  EYES: EOMI, PERRLA, conjunctiva and sclera clear  NECK: Supple, No JVD  CHEST/LUNG: Clear to auscultation bilaterally; Normal respiratory effort w/o intercostal retractions  HEART: Irregular rhythm, tachycardic; nl S1/S2; No murmurs, rubs, or gallops  ABDOMEN: Soft, Nontender, Nondistended; Bowel sounds present; No hepatosplenomegaly  EXTREMITIES:  2+ Peripheral Pulses; (+)rt foream thrombophlebitis; rt UE midline site OK  NEURO: A+O x 3; nonfocal CN/motor/sensory/reflexes; speech + comprehension are intact  PSYCH: Nl affect; no delirium or agitation; no suicidal/homicidal ideation    LABS:                        11.2   8.74  )-----------( 316      ( 25 Feb 2024 05:25 )             35.0     02-25    141  |  105  |  24<H>  ----------------------------<  69<L>  3.6   |  28  |  0.44<L>    Ca    8.9      25 Feb 2024 05:25  Phos  3.1     02-25  Mg     1.80     02-25    TPro  5.6<L>  /  Alb  2.9<L>  /  TBili  0.5  /  DBili  x   /  AST  32  /  ALT  170<H>  /  AlkPhos  112  02-25    PT/INR - ( 25 Feb 2024 05:25 )   PT: 38.1 sec;   INR: 3.50 ratio         PTT - ( 25 Feb 2024 05:25 )  PTT:82.1 sec  CAPILLARY BLOOD GLUCOSE      POCT Blood Glucose.: 194 mg/dL (25 Feb 2024 11:13)  POCT Blood Glucose.: 77 mg/dL (25 Feb 2024 07:20)  POCT Blood Glucose.: 199 mg/dL (24 Feb 2024 21:05)  POCT Blood Glucose.: 153 mg/dL (24 Feb 2024 16:35)        Urinalysis Basic - ( 25 Feb 2024 05:25 )    Color: x / Appearance: x / SG: x / pH: x  Gluc: 69 mg/dL / Ketone: x  / Bili: x / Urobili: x   Blood: x / Protein: x / Nitrite: x   Leuk Esterase: x / RBC: x / WBC x   Sq Epi: x / Non Sq Epi: x / Bacteria: x        RADIOLOGY & ADDITIONAL TESTS:    Imaging Personally Reviewed:  [x] YES  [ ] NO    Case discussed with NPP:  [X] YES  [ ] NO

## 2024-02-26 ENCOUNTER — RESULT REVIEW (OUTPATIENT)
Age: 69
End: 2024-02-26

## 2024-02-26 LAB
ALBUMIN SERPL ELPH-MCNC: 2.9 G/DL — LOW (ref 3.3–5)
ALP SERPL-CCNC: 96 U/L — SIGNIFICANT CHANGE UP (ref 40–120)
ALT FLD-CCNC: 122 U/L — HIGH (ref 4–33)
ANION GAP SERPL CALC-SCNC: 9 MMOL/L — SIGNIFICANT CHANGE UP (ref 7–14)
APTT BLD: 47 SEC — HIGH (ref 24.5–35.6)
AST SERPL-CCNC: 23 U/L — SIGNIFICANT CHANGE UP (ref 4–32)
BASOPHILS # BLD AUTO: 0.04 K/UL — SIGNIFICANT CHANGE UP (ref 0–0.2)
BASOPHILS NFR BLD AUTO: 0.7 % — SIGNIFICANT CHANGE UP (ref 0–2)
BILIRUB SERPL-MCNC: 0.5 MG/DL — SIGNIFICANT CHANGE UP (ref 0.2–1.2)
BUN SERPL-MCNC: 17 MG/DL — SIGNIFICANT CHANGE UP (ref 7–23)
CALCIUM SERPL-MCNC: 8.8 MG/DL — SIGNIFICANT CHANGE UP (ref 8.4–10.5)
CHLORIDE SERPL-SCNC: 103 MMOL/L — SIGNIFICANT CHANGE UP (ref 98–107)
CO2 SERPL-SCNC: 28 MMOL/L — SIGNIFICANT CHANGE UP (ref 22–31)
CREAT SERPL-MCNC: 0.43 MG/DL — LOW (ref 0.5–1.3)
EGFR: 107 ML/MIN/1.73M2 — SIGNIFICANT CHANGE UP
EOSINOPHIL # BLD AUTO: 0.16 K/UL — SIGNIFICANT CHANGE UP (ref 0–0.5)
EOSINOPHIL NFR BLD AUTO: 2.8 % — SIGNIFICANT CHANGE UP (ref 0–6)
GLUCOSE BLDC GLUCOMTR-MCNC: 133 MG/DL — HIGH (ref 70–99)
GLUCOSE BLDC GLUCOMTR-MCNC: 169 MG/DL — HIGH (ref 70–99)
GLUCOSE BLDC GLUCOMTR-MCNC: 237 MG/DL — HIGH (ref 70–99)
GLUCOSE BLDC GLUCOMTR-MCNC: 261 MG/DL — HIGH (ref 70–99)
GLUCOSE BLDC GLUCOMTR-MCNC: 264 MG/DL — HIGH (ref 70–99)
GLUCOSE SERPL-MCNC: 128 MG/DL — HIGH (ref 70–99)
HCT VFR BLD CALC: 33.6 % — LOW (ref 34.5–45)
HGB BLD-MCNC: 10.7 G/DL — LOW (ref 11.5–15.5)
IANC: 3.96 K/UL — SIGNIFICANT CHANGE UP (ref 1.8–7.4)
IMM GRANULOCYTES NFR BLD AUTO: 1.9 % — HIGH (ref 0–0.9)
INR BLD: 4.47 RATIO — HIGH (ref 0.85–1.18)
LYMPHOCYTES # BLD AUTO: 0.83 K/UL — LOW (ref 1–3.3)
LYMPHOCYTES # BLD AUTO: 14.7 % — SIGNIFICANT CHANGE UP (ref 13–44)
MAGNESIUM SERPL-MCNC: 1.8 MG/DL — SIGNIFICANT CHANGE UP (ref 1.6–2.6)
MCHC RBC-ENTMCNC: 29.1 PG — SIGNIFICANT CHANGE UP (ref 27–34)
MCHC RBC-ENTMCNC: 31.8 GM/DL — LOW (ref 32–36)
MCV RBC AUTO: 91.3 FL — SIGNIFICANT CHANGE UP (ref 80–100)
MONOCYTES # BLD AUTO: 0.56 K/UL — SIGNIFICANT CHANGE UP (ref 0–0.9)
MONOCYTES NFR BLD AUTO: 9.9 % — SIGNIFICANT CHANGE UP (ref 2–14)
NEUTROPHILS # BLD AUTO: 3.96 K/UL — SIGNIFICANT CHANGE UP (ref 1.8–7.4)
NEUTROPHILS NFR BLD AUTO: 70 % — SIGNIFICANT CHANGE UP (ref 43–77)
NRBC # BLD: 0 /100 WBCS — SIGNIFICANT CHANGE UP (ref 0–0)
NRBC # FLD: 0 K/UL — SIGNIFICANT CHANGE UP (ref 0–0)
PHOSPHATE SERPL-MCNC: 2.9 MG/DL — SIGNIFICANT CHANGE UP (ref 2.5–4.5)
PLATELET # BLD AUTO: 326 K/UL — SIGNIFICANT CHANGE UP (ref 150–400)
POTASSIUM SERPL-MCNC: 4.2 MMOL/L — SIGNIFICANT CHANGE UP (ref 3.5–5.3)
POTASSIUM SERPL-SCNC: 4.2 MMOL/L — SIGNIFICANT CHANGE UP (ref 3.5–5.3)
PROT SERPL-MCNC: 5.8 G/DL — LOW (ref 6–8.3)
PROTHROM AB SERPL-ACNC: 47.9 SEC — HIGH (ref 9.5–13)
RBC # BLD: 3.68 M/UL — LOW (ref 3.8–5.2)
RBC # FLD: 15.3 % — HIGH (ref 10.3–14.5)
SODIUM SERPL-SCNC: 140 MMOL/L — SIGNIFICANT CHANGE UP (ref 135–145)
WBC # BLD: 5.66 K/UL — SIGNIFICANT CHANGE UP (ref 3.8–10.5)
WBC # FLD AUTO: 5.66 K/UL — SIGNIFICANT CHANGE UP (ref 3.8–10.5)

## 2024-02-26 PROCEDURE — 99232 SBSQ HOSP IP/OBS MODERATE 35: CPT

## 2024-02-26 PROCEDURE — 93308 TTE F-UP OR LMTD: CPT | Mod: 26,GC

## 2024-02-26 PROCEDURE — 93321 DOPPLER ECHO F-UP/LMTD STD: CPT | Mod: 26

## 2024-02-26 RX ORDER — MAGNESIUM SULFATE 500 MG/ML
2 VIAL (ML) INJECTION ONCE
Refills: 0 | Status: COMPLETED | OUTPATIENT
Start: 2024-02-26 | End: 2024-02-26

## 2024-02-26 RX ORDER — POTASSIUM CHLORIDE 20 MEQ
40 PACKET (EA) ORAL ONCE
Refills: 0 | Status: COMPLETED | OUTPATIENT
Start: 2024-02-26 | End: 2024-02-26

## 2024-02-26 RX ORDER — INSULIN LISPRO 100/ML
4 VIAL (ML) SUBCUTANEOUS
Refills: 0 | Status: DISCONTINUED | OUTPATIENT
Start: 2024-02-26 | End: 2024-02-27

## 2024-02-26 RX ORDER — POTASSIUM CHLORIDE 20 MEQ
20 PACKET (EA) ORAL ONCE
Refills: 0 | Status: COMPLETED | OUTPATIENT
Start: 2024-02-26 | End: 2024-02-26

## 2024-02-26 RX ADMIN — Medication 100 MILLIGRAM(S): at 18:40

## 2024-02-26 RX ADMIN — Medication 3 UNIT(S): at 11:49

## 2024-02-26 RX ADMIN — Medication 100 MILLIGRAM(S): at 06:12

## 2024-02-26 RX ADMIN — Medication 25 GRAM(S): at 08:17

## 2024-02-26 RX ADMIN — Medication 3: at 11:48

## 2024-02-26 RX ADMIN — Medication 20 MILLIEQUIVALENT(S): at 14:32

## 2024-02-26 RX ADMIN — Medication 3 UNIT(S): at 07:27

## 2024-02-26 RX ADMIN — Medication 1: at 17:22

## 2024-02-26 RX ADMIN — INSULIN GLARGINE 7 UNIT(S): 100 INJECTION, SOLUTION SUBCUTANEOUS at 18:39

## 2024-02-26 RX ADMIN — Medication 4 UNIT(S): at 17:23

## 2024-02-26 RX ADMIN — Medication 40 MILLIEQUIVALENT(S): at 08:17

## 2024-02-26 NOTE — PROGRESS NOTE ADULT - ASSESSMENT
68 year old woman with PMH of mechanical aortic valve, rheumatic heart disease, afib, HTN and DM2 admitted with euglycemic DKA and afib with RVR.     1) T2DM with hyperglycemia  eDKA in setting of infection and afib /w RVR - now resolved  A1c 6.9%  Home regimen - jardiance and janumet    Inpatient plan  - FS goal 100-180 mg/dl   - fasting glucose at goal   - continue Lantus 7 units daily at 6 pm   - prandial glucose above goal   - increase Admelog to 4 units TID AC. Hold if not eating/NPO.   - continue low Admelog correctional scale TID AC  - continue separate low Admelog correctional scale at HS     Discharge plan  - follow up with outpatient endocrinologist Dr. Ch  - STOP Jardiance on discharge given urosepsis and eDKA, can consider resuming in future as outpatient.   - EF noted to be 30-15% - Possibly discharge on Janumet 50-1000mg BID, but may need to  pending EF  - May need additional agent as well depending on blood glucose values. Lactate now normalized 0.8 on 02/25/2024.    2) HTN  - goal BP <130/80  - continue enalapril, metoprolol  - management per primary team     3) HLD  - goal LDL <70   - atorvastatin being held due to transaminitis, values improving  - defer management to primary team     Jarad Gr, AGACNP-BC  Nurse Practitioner  Division of Endocrinology & Diabetes  pager [phone]

## 2024-02-26 NOTE — PROGRESS NOTE ADULT - SUBJECTIVE AND OBJECTIVE BOX
Chief Complaint: DM type 2     Interval Events: Fasting glucose at goal. Son at bedside. Patient reports good appetite. No N/V or abdominal pain.     MEDICATIONS  (STANDING):  dextrose 5%. 1000 milliLiter(s) (100 mL/Hr) IV Continuous <Continuous>  dextrose 5%. 1000 milliLiter(s) (50 mL/Hr) IV Continuous <Continuous>  dextrose 50% Injectable 25 Gram(s) IV Push once  dextrose 50% Injectable 12.5 Gram(s) IV Push once  dextrose 50% Injectable 25 Gram(s) IV Push once  digoxin     Tablet 125 MICROGram(s) Oral every other day  glucagon  Injectable 1 milliGRAM(s) IntraMuscular once  insulin glargine Injectable (LANTUS) 7 Unit(s) SubCutaneous <User Schedule>  insulin lispro (ADMELOG) corrective regimen sliding scale   SubCutaneous three times a day before meals  insulin lispro (ADMELOG) corrective regimen sliding scale   SubCutaneous at bedtime  insulin lispro Injectable (ADMELOG) 4 Unit(s) SubCutaneous three times a day before meals  metoprolol succinate  milliGRAM(s) Oral two times a day    MEDICATIONS  (PRN):  dextrose Oral Gel 15 Gram(s) Oral once PRN Blood Glucose LESS THAN 70 milliGRAM(s)/deciliter  guaiFENesin Oral Liquid (Sugar-Free) 200 milliGRAM(s) Oral every 6 hours PRN Cough  sodium chloride 0.65% Nasal 1 Spray(s) Both Nostrils two times a day PRN Nasal Congestion      Allergies  No Known Allergies    Review of Systems:  Constitutional: No fever/chills   Cardiovascular: No chest pain, no palpitations  Respiratory: No SOB, no cough  GI: No nausea, vomiting or abdominal pain  : No dysuria  Endocrine: no polyuria, polydipsia      VITALS: T(C): 37.1 (02-26-24 @ 10:15)  T(F): 98.7 (02-26-24 @ 10:15), Max: 98.7 (02-26-24 @ 10:15)  HR: 85 (02-26-24 @ 10:15) (75 - 92)  BP: 108/62 (02-26-24 @ 10:15) (108/62 - 132/71)  RR:  (18 - 19)  SpO2:  (98% - 100%)      Physical Exam:   GENERAL: NAD, well-groomed, well-developed  EYES: No proptosis, no lid lag, anicteric  HEENT:  Atraumatic, Normocephalic, moist mucous membranes  RESPIRATORY: non labored breathing   GI: Soft, nontender, non distended  MUSCULOSKELETAL: Full range of motion, normal strength  NEURO: A&Ox3      CAPILLARY BLOOD GLUCOSE  POCT Blood Glucose.: 264 mg/dL (26 Feb 2024 11:19)  POCT Blood Glucose.: 133 mg/dL (26 Feb 2024 07:22)  POCT Blood Glucose.: 200 mg/dL (25 Feb 2024 23:22)  POCT Blood Glucose.: 190 mg/dL (25 Feb 2024 16:35)      02-26    140  |  103  |  17  ----------------------------<  128<H>  4.2   |  28  |  0.43<L>    eGFR: 107    Ca    8.8      02-26  Mg     1.80     02-26  Phos  2.9     02-26    TPro  5.8<L>  /  Alb  2.9<L>  /  TBili  0.5  /  DBili  x   /  AST  23  /  ALT  122<H>  /  AlkPhos  96  02-26      A1C with Estimated Average Glucose Result: 6.9 % (02-20-24 @ 00:25)  A1C with Estimated Average Glucose Result: 6.6 % (10-31-23 @ 05:58)      Thyroid Function Tests:  02-19 @ 18:53 TSH 2.50 FreeT4 -- T3 -- Anti TPO -- Anti Thyroglobulin Ab -- TSI --  02-19 @ 16:45 TSH 2.66 FreeT4 -- T3 -- Anti TPO -- Anti Thyroglobulin Ab -- TSI --

## 2024-02-26 NOTE — PROGRESS NOTE ADULT - SUBJECTIVE AND OBJECTIVE BOX
Ambulating well  No palpitations, CP or SOB  (+)rt forearm mildly tender patch of erythema close to former PIV site    Vital Signs Last 24 Hrs  T(C): 37.1 (26 Feb 2024 10:15), Max: 37.1 (26 Feb 2024 10:15)  T(F): 98.7 (26 Feb 2024 10:15), Max: 98.7 (26 Feb 2024 10:15)  HR: 85 (26 Feb 2024 10:15) (75 - 92)  BP: 108/62 (26 Feb 2024 10:15) (108/62 - 132/71)  BP(mean): --  RR: 19 (26 Feb 2024 10:15) (18 - 19)  SpO2: 100% (26 Feb 2024 10:15) (98% - 100%)    I&O's Summary      GENERAL: NAD  HEAD:  Atraumatic, Normocephalic  EYES: EOMI, PERRLA, conjunctiva and sclera clear  NECK: Supple, No JVD  CHEST/LUNG: Clear to auscultation bilaterally; Normal respiratory effort w/o intercostal retractions  HEART: Irregular rhythm, tachycardic; nl S1/S2; No murmurs, rubs, or gallops  ABDOMEN: Soft, Nontender, Nondistended; Bowel sounds present; No hepatosplenomegaly  EXTREMITIES:  2+ Peripheral Pulses; (+)rt foream thrombophlebitis  NEURO: A+O x 3; nonfocal CN/motor/sensory/reflexes; speech + comprehension are intact  PSYCH: Nl affect; no delirium or agitation; no suicidal/homicidal ideation    LABS:                        10.7   5.66  )-----------( 326      ( 26 Feb 2024 04:30 )             33.6     02-26    140  |  103  |  17  ----------------------------<  128<H>  4.2   |  28  |  0.43<L>    Ca    8.8      26 Feb 2024 04:30  Phos  2.9     02-26  Mg     1.80     02-26    TPro  5.8<L>  /  Alb  2.9<L>  /  TBili  0.5  /  DBili  x   /  AST  23  /  ALT  122<H>  /  AlkPhos  96  02-26    PT/INR - ( 26 Feb 2024 04:30 )   PT: 47.9 sec;   INR: 4.47 ratio         PTT - ( 26 Feb 2024 04:30 )  PTT:47.0 sec  CAPILLARY BLOOD GLUCOSE      POCT Blood Glucose.: 264 mg/dL (26 Feb 2024 11:19)  POCT Blood Glucose.: 133 mg/dL (26 Feb 2024 07:22)  POCT Blood Glucose.: 200 mg/dL (25 Feb 2024 23:22)  POCT Blood Glucose.: 190 mg/dL (25 Feb 2024 16:35)        Urinalysis Basic - ( 26 Feb 2024 04:30 )    Color: x / Appearance: x / SG: x / pH: x  Gluc: 128 mg/dL / Ketone: x  / Bili: x / Urobili: x   Blood: x / Protein: x / Nitrite: x   Leuk Esterase: x / RBC: x / WBC x   Sq Epi: x / Non Sq Epi: x / Bacteria: x        RADIOLOGY & ADDITIONAL TESTS:    Imaging Personally Reviewed:  [x] YES  [ ] NO    Case discussed with NPP:  [X] YES  [ ] NO

## 2024-02-26 NOTE — PROGRESS NOTE ADULT - ASSESSMENT
69 y/o F PMhx mechanical aortic replacement, rheumatic heart disease, mitral valve repair, atrial fibrillation, HTN, DM 2 on Jardiance who presented w/ URI symptoms and dysuria  found to have septic shock, DKA, UTI, elevated liver enzymes    RUE thrombophlebitis  RUE palpable cord, erythema, mild tenderness    UTI- treated  septic shock- resolved  dysuria, frequency, urgency on admission  no flank tenderness  urine cx w/ E Coli, sensitivities reviewed    elevated liver enzymes- improving  hepatic viral serologies negative  abd US- suggestive of steatosis. Cholelithiasis. Nonspecific mild mural thickening. Findings likely associated with underlying liver disease.    Recommendations  s/p 7 day course of antibiotics, completed 2/25  warm compresses for thrombophlebitis  if erythema progresses start doxycycline 100mg bid x 5 days    Trell Franklin M.D.  \[facility] [place] [facility]\"", Division of Infectious Diseases  [phone]  After 5pm on weekdays and all day on weekends - please call [phone]

## 2024-02-26 NOTE — PROGRESS NOTE ADULT - SUBJECTIVE AND OBJECTIVE BOX
[facility], Division of Infectious Diseases  CHAYO Melendez Y. Patel, S. Shah, G. Rigoberto  [phone]  ([phone] - weekdays after 5pm and weekends)    Name: NAOMI RUBALCAVA  Age/Gender: 68y Female  MRN: [id number]    Interval History:  Notes reviewed.   No concerning overnight events.  Afebrile.   reports no longer having LE edema  but states has RUE swelling and some pain    Allergies: No Known Allergies      Objective:  Vitals:   T(F): 98.7 (02-26-24 @ 10:15), Max: 98.7 (02-26-24 @ 10:15)  HR: 85 (02-26-24 @ 10:15) (75 - 92)  BP: 108/62 (02-26-24 @ 10:15) (108/62 - 132/71)  RR: 19 (02-26-24 @ 10:15) (18 - 19)  SpO2: 100% (02-26-24 @ 10:15) (98% - 100%)  Physical Examination:  General: no acute distress  HEENT: anicteric  Cardio: S1, S2, normal rate  Resp: clear to auscultation anteriorly   Abd: soft, NT, ND  Ext: RUE palpable cord, erythema, mild tenderness; no LE edema  Skin: warm, dry    Laboratory Studies:  CBC:                       10.7   5.66  )-----------( 326      ( 26 Feb 2024 04:30 )             33.6     WBC Trend:  5.66 02-26-24 @ 04:30  8.74 02-25-24 @ 05:25  11.17 02-24-24 @ 05:51  9.88 02-23-24 @ 05:00  12.71 02-22-24 @ 00:21  15.00 02-21-24 @ 16:36  18.18 02-21-24 @ 00:40  18.80 02-20-24 @ 18:10  15.32 02-20-24 @ 00:25  9.72 02-19-24 @ 14:43    CMP: 02-26    140  |  103  |  17  ----------------------------<  128<H>  4.2   |  28  |  0.43<L>    Ca    8.8      26 Feb 2024 04:30  Phos  2.9     02-26  Mg     1.80     02-26    TPro  5.8<L>  /  Alb  2.9<L>  /  TBili  0.5  /  DBili  x   /  AST  23  /  ALT  122<H>  /  AlkPhos  96  02-26      LIVER FUNCTIONS - ( 26 Feb 2024 04:30 )  Alb: 2.9 g/dL / Pro: 5.8 g/dL / ALK PHOS: 96 U/L / ALT: 122 U/L / AST: 23 U/L / GGT: x             Urinalysis Basic - ( 26 Feb 2024 04:30 )    Color: x / Appearance: x / SG: x / pH: x  Gluc: 128 mg/dL / Ketone: x  / Bili: x / Urobili: x   Blood: x / Protein: x / Nitrite: x   Leuk Esterase: x / RBC: x / WBC x   Sq Epi: x / Non Sq Epi: x / Bacteria: x      Microbiology: reviewed     Culture - Sputum (collected 02-20-24 @ 12:40)  Source: .Sputum Sputum  Gram Stain (02-20-24 @ 23:17):    Numerous polymorphonuclear leukocytes per low power field    Few Squamous epithelial cells per low power field    Moderate Gram positive cocci in pairs per oil power field    Few Gram Variable Rods per oil power field  Final Report (02-22-24 @ 06:52):    Normal Respiratory Vera present    Culture - Urine (collected 02-20-24 @ 01:10)  Source: Catheterized Catheterized  Final Report (02-21-24 @ 07:26):    <10,000 CFU/mL Normal Urogenital Vera    Culture - Urine (collected 02-19-24 @ 18:18)  Source: Clean Catch Clean Catch (Midstream)  Final Report (02-22-24 @ 09:45):    50,000 - 99,000 CFU/mL Escherichia coli    <10,000 CFU/ml Normal Urogenital vera present  Organism: Escherichia coli (02-22-24 @ 09:45)  Organism: Escherichia coli (02-22-24 @ 09:45)      Method Type: TUNDE      -  Amoxicillin/Clavulanic Acid: S <=8/4      -  Ampicillin: R >16 These ampicillin results predict results for amoxicillin      -  Ampicillin/Sulbactam: I 16/8      -  Aztreonam: S <=4      -  Cefazolin: S <=2 For uncomplicated UTI with K. pneumoniae, E. coli, or P. mirablis: TUNDE <=16 is sensitive and TUNDE >=32 is resistant. This also predicts results for oral agents cefaclor, cefdinir, cefpodoxime, cefprozil, cefuroxime axetil, cephalexin and locarbef for uncomplicated UTI. Note that some isolates may be susceptible to these agents while testing resistant to cefazolin.      -  Cefepime: S <=2      -  Cefoxitin: S <=8      -  Ceftriaxone: S <=1      -  Cefuroxime: S <=4      -  Ciprofloxacin: S <=0.25      -  Ertapenem: S <=0.5      -  Gentamicin: R >8      -  Imipenem: S <=1      -  Levofloxacin: S <=0.5      -  Meropenem: S <=1      -  Nitrofurantoin: S <=32 Should not be used to treat pyelonephritis      -  Piperacillin/Tazobactam: S <=8      -  Tobramycin: R 8      -  Trimethoprim/Sulfamethoxazole: S <=0.5/9.5    Culture - Blood (collected 02-19-24 @ 15:21)  Source: .Blood Blood-Peripheral  Final Report (02-24-24 @ 19:01):    No growth at 5 days    Culture - Blood (collected 02-19-24 @ 14:43)  Source: .Blood Blood-Peripheral  Final Report (02-24-24 @ 17:01):    No growth at 5 days        Radiology: reviewed     Medications:  dextrose 5%. 1000 milliLiter(s) IV Continuous <Continuous>  dextrose 5%. 1000 milliLiter(s) IV Continuous <Continuous>  dextrose 50% Injectable 25 Gram(s) IV Push once  dextrose 50% Injectable 12.5 Gram(s) IV Push once  dextrose 50% Injectable 25 Gram(s) IV Push once  dextrose Oral Gel 15 Gram(s) Oral once PRN  digoxin     Tablet 125 MICROGram(s) Oral every other day  doxycycline monohydrate Capsule 100 milliGRAM(s) Oral every 12 hours  glucagon  Injectable 1 milliGRAM(s) IntraMuscular once  guaiFENesin Oral Liquid (Sugar-Free) 200 milliGRAM(s) Oral every 6 hours PRN  insulin glargine Injectable (LANTUS) 7 Unit(s) SubCutaneous <User Schedule>  insulin lispro (ADMELOG) corrective regimen sliding scale   SubCutaneous three times a day before meals  insulin lispro (ADMELOG) corrective regimen sliding scale   SubCutaneous at bedtime  insulin lispro Injectable (ADMELOG) 3 Unit(s) SubCutaneous before breakfast  insulin lispro Injectable (ADMELOG) 3 Unit(s) SubCutaneous before dinner  insulin lispro Injectable (ADMELOG) 3 Unit(s) SubCutaneous before lunch  metoprolol succinate  milliGRAM(s) Oral two times a day  potassium chloride   Powder 20 milliEquivalent(s) Oral once  sodium chloride 0.65% Nasal 1 Spray(s) Both Nostrils two times a day PRN    Antimicrobials:  doxycycline monohydrate Capsule 100 milliGRAM(s) Oral every 12 hours

## 2024-02-26 NOTE — PROGRESS NOTE ADULT - ASSESSMENT
68-yo Female with PMHx of mitral regurgitation, rheumatic heart disease s/p mechanical AVR/MVR, HTN, chronic afib on digoxin + warfarin, DM2 on Jardiance, who was admitted to MICU w/septic shock 2' UTI, afib w/RVR, and euglycemic DKA    Septic shock 2' UTI  - admitted to MICU  - s/p IVF resuscitation  - weaned off pressor support 2/20/24  - blood cxs 2/19/24 --> (-)  - urine cx 2/19/24 --> non-ESBL E coli  - received ceftriaxone/azithromycin x 1 2/19/24  - zosyn 2/20/24 --> 2/22/24  - ceftriaxone 2/22/24 --> 2/25/24  - appreciate MICU care, ID input    Chronic atrial fibrillation with RVR  - s/p amiodarone bolus (2/20) + gtt, initially started on PO amio load but stopped 2/22/24 since no longer needed as per cardiology  - TTE 2/20  LV systolic function is severely decreased with EF 37 % a change from previous echo 11/23  - repeat ECHO 2/22 unchanged  - continue warfarin, INR goal 2.5-3.5  - c/w digoxin every other day and lopressor   - repeat limited TTE to evaluate LV systolic fxn expedited this morning  - appreciate cardiology    Mechanical aortic/mitral valve replacement in setting of rheumatic heart disease  - continue warfarin, INR goal 2.5-3.5; hold coumadin 2/26/24  - heparin gtt d/nicole 2/25/24    Rt forearm thrombophlebitis  - warm compresses to rt forearm   - rt midline removed 2/25/24    Euglycemic DKA  - A1c 6.9%  - ED: pH 7.27 AG 20, Bicarb 15, Glucose 201, Lactate 4  - s/p 4 L IVF in ED  - transitioned off insulin with 6 units Lantus (2/20)  - basal/bolus insulin dosing as per endocrinology  - continue low admelog correction scales before meals and bedtime  - follow up with outpatient endocrinologist Dr. hC  - stop jardiance on discharge given urosepsis and EDKA. can consider resuming in future as outpt  - lactate has normalized --> may resume janumet 50-1000mg BID upon d/c, may need additional agent as well depending on blood glucose values  - appreciate endocrinology    LUISA  - Cr from 11/2023 of 0.39  - admission Cr of 0.89  - LUISA likely pre-renal in the setting of shock  - s/p lasix 40mg IVP (2/21)   - diuretics PRN goal to keep net even  - indwelling catheter placed 2/20, passed TOV 2/22/24  - resolved    HTN  - home enalapril initially held in the setting of hypotension   - resumed 2/27/24    HLD  - atorvastatin 20 mg daily held iso elevated LFT's  - will resume as outpt in 2-3 weeks    Transaminitis   - unknown etiology  - hepatitis panel neg  - RUQ US 2/20 w/findings suggestive of steatosis, cholelithiasis, GB nonspecific mural thickening  - improving    Need for prophylaxis  - daily dosed coumadin

## 2024-02-27 ENCOUNTER — TRANSCRIPTION ENCOUNTER (OUTPATIENT)
Age: 69
End: 2024-02-27

## 2024-02-27 LAB
24R-OH-CALCIDIOL SERPL-MCNC: 16.7 NG/ML — LOW (ref 30–80)
GLUCOSE BLDC GLUCOMTR-MCNC: 120 MG/DL — HIGH (ref 70–99)
GLUCOSE BLDC GLUCOMTR-MCNC: 160 MG/DL — HIGH (ref 70–99)
GLUCOSE BLDC GLUCOMTR-MCNC: 224 MG/DL — HIGH (ref 70–99)
GLUCOSE BLDC GLUCOMTR-MCNC: 245 MG/DL — HIGH (ref 70–99)
GLUCOSE BLDC GLUCOMTR-MCNC: 253 MG/DL — HIGH (ref 70–99)
HCT VFR BLD CALC: 35.1 % — SIGNIFICANT CHANGE UP (ref 34.5–45)
HGB BLD-MCNC: 11.1 G/DL — LOW (ref 11.5–15.5)
INR BLD: 4.1 RATIO — HIGH (ref 0.85–1.18)
MCHC RBC-ENTMCNC: 28.8 PG — SIGNIFICANT CHANGE UP (ref 27–34)
MCHC RBC-ENTMCNC: 31.6 GM/DL — LOW (ref 32–36)
MCV RBC AUTO: 90.9 FL — SIGNIFICANT CHANGE UP (ref 80–100)
NRBC # BLD: 0 /100 WBCS — SIGNIFICANT CHANGE UP (ref 0–0)
NRBC # FLD: 0 K/UL — SIGNIFICANT CHANGE UP (ref 0–0)
PLATELET # BLD AUTO: 372 K/UL — SIGNIFICANT CHANGE UP (ref 150–400)
PROTHROM AB SERPL-ACNC: 44.5 SEC — HIGH (ref 9.5–13)
RBC # BLD: 3.86 M/UL — SIGNIFICANT CHANGE UP (ref 3.8–5.2)
RBC # FLD: 15.4 % — HIGH (ref 10.3–14.5)
WBC # BLD: 4.6 K/UL — SIGNIFICANT CHANGE UP (ref 3.8–10.5)
WBC # FLD AUTO: 4.6 K/UL — SIGNIFICANT CHANGE UP (ref 3.8–10.5)

## 2024-02-27 PROCEDURE — 99232 SBSQ HOSP IP/OBS MODERATE 35: CPT

## 2024-02-27 RX ORDER — INSULIN LISPRO 100/ML
5 VIAL (ML) SUBCUTANEOUS
Refills: 0 | Status: DISCONTINUED | OUTPATIENT
Start: 2024-02-27 | End: 2024-02-27

## 2024-02-27 RX ORDER — INSULIN LISPRO 100/ML
5 VIAL (ML) SUBCUTANEOUS
Refills: 0 | Status: DISCONTINUED | OUTPATIENT
Start: 2024-02-27 | End: 2024-02-28

## 2024-02-27 RX ORDER — CHOLECALCIFEROL (VITAMIN D3) 125 MCG
1000 CAPSULE ORAL DAILY
Refills: 0 | Status: DISCONTINUED | OUTPATIENT
Start: 2024-02-27 | End: 2024-02-28

## 2024-02-27 RX ORDER — IBUPROFEN 200 MG
400 TABLET ORAL EVERY 8 HOURS
Refills: 0 | Status: DISCONTINUED | OUTPATIENT
Start: 2024-02-27 | End: 2024-02-27

## 2024-02-27 RX ORDER — IBUPROFEN 200 MG
400 TABLET ORAL EVERY 8 HOURS
Refills: 0 | Status: DISCONTINUED | OUTPATIENT
Start: 2024-02-27 | End: 2024-02-28

## 2024-02-27 RX ORDER — WARFARIN SODIUM 2.5 MG/1
2.5 TABLET ORAL ONCE
Refills: 0 | Status: COMPLETED | OUTPATIENT
Start: 2024-02-27 | End: 2024-02-27

## 2024-02-27 RX ADMIN — Medication 1: at 17:06

## 2024-02-27 RX ADMIN — Medication 4 UNIT(S): at 07:54

## 2024-02-27 RX ADMIN — WARFARIN SODIUM 2.5 MILLIGRAM(S): 2.5 TABLET ORAL at 21:37

## 2024-02-27 RX ADMIN — Medication 400 MILLIGRAM(S): at 22:20

## 2024-02-27 RX ADMIN — Medication 2.5 MILLIGRAM(S): at 05:40

## 2024-02-27 RX ADMIN — Medication 125 MICROGRAM(S): at 11:47

## 2024-02-27 RX ADMIN — Medication 100 MILLIGRAM(S): at 17:07

## 2024-02-27 RX ADMIN — Medication 400 MILLIGRAM(S): at 21:37

## 2024-02-27 RX ADMIN — INSULIN GLARGINE 7 UNIT(S): 100 INJECTION, SOLUTION SUBCUTANEOUS at 18:03

## 2024-02-27 RX ADMIN — Medication 400 MILLIGRAM(S): at 15:20

## 2024-02-27 RX ADMIN — Medication 2: at 11:46

## 2024-02-27 RX ADMIN — Medication 4 UNIT(S): at 11:46

## 2024-02-27 RX ADMIN — Medication 5 UNIT(S): at 17:06

## 2024-02-27 RX ADMIN — Medication 100 MILLIGRAM(S): at 05:40

## 2024-02-27 RX ADMIN — Medication 400 MILLIGRAM(S): at 14:50

## 2024-02-27 NOTE — PROGRESS NOTE ADULT - SUBJECTIVE AND OBJECTIVE BOX
Chief Complaint: DM type 2     Interval Events: Fasting glucose at goal. Good appetite. No N/V or abdominal pain.     MEDICATIONS  (STANDING):  dextrose 5%. 1000 milliLiter(s) (50 mL/Hr) IV Continuous <Continuous>  dextrose 5%. 1000 milliLiter(s) (100 mL/Hr) IV Continuous <Continuous>  dextrose 50% Injectable 25 Gram(s) IV Push once  dextrose 50% Injectable 12.5 Gram(s) IV Push once  dextrose 50% Injectable 25 Gram(s) IV Push once  enalapril 2.5 milliGRAM(s) Oral daily  glucagon  Injectable 1 milliGRAM(s) IntraMuscular once  ibuprofen  Tablet. 400 milliGRAM(s) Oral every 8 hours  insulin glargine Injectable (LANTUS) 7 Unit(s) SubCutaneous <User Schedule>  insulin lispro (ADMELOG) corrective regimen sliding scale   SubCutaneous three times a day before meals  insulin lispro (ADMELOG) corrective regimen sliding scale   SubCutaneous at bedtime  insulin lispro Injectable (ADMELOG) 5 Unit(s) SubCutaneous three times a day before meals  metoprolol succinate  milliGRAM(s) Oral two times a day  warfarin 2.5 milliGRAM(s) Oral once    MEDICATIONS  (PRN):  dextrose Oral Gel 15 Gram(s) Oral once PRN Blood Glucose LESS THAN 70 milliGRAM(s)/deciliter  guaiFENesin Oral Liquid (Sugar-Free) 200 milliGRAM(s) Oral every 6 hours PRN Cough  sodium chloride 0.65% Nasal 1 Spray(s) Both Nostrils two times a day PRN Nasal Congestion      Allergies  No Known Allergies    Review of Systems:  Constitutional: No fever/chills   Eyes: No blurry vision  Neuro: No tremors  HEENT: No pain  Cardiovascular: No chest pain, no palpitations  Respiratory: No SOB, no cough  GI: No nausea, vomiting or abdominal pain  : No dysuria  Endocrine: no polyuria, polydipsia      VITALS: T(C): 36.3 (02-27-24 @ 09:55)  T(F): 97.4 (02-27-24 @ 09:55), Max: 97.9 (02-26-24 @ 17:20)  HR: 77 (02-27-24 @ 09:55) (77 - 87)  BP: 103/52 (02-27-24 @ 09:55) (103/52 - 131/69)  RR:  (18 - 18)  SpO2:  (98% - 100%)      Physical Exam:   GENERAL: NAD, well-groomed, well-developed  EYES: No proptosis, no lid lag, anicteric  HEENT:  Atraumatic, Normocephalic, moist mucous membranes  RESPIRATORY: non labored breathing   GI: Soft, nontender, non distended  MUSCULOSKELETAL: Full range of motion, normal strength  NEURO: A&Ox3    CAPILLARY BLOOD GLUCOSE  POCT Blood Glucose.: 224 mg/dL (27 Feb 2024 11:33)  POCT Blood Glucose.: 120 mg/dL (27 Feb 2024 07:51)  POCT Blood Glucose.: 237 mg/dL (26 Feb 2024 21:14)  POCT Blood Glucose.: 261 mg/dL (26 Feb 2024 18:36)  POCT Blood Glucose.: 169 mg/dL (26 Feb 2024 16:40)      02-26    140  |  103  |  17  ----------------------------<  128<H>  4.2   |  28  |  0.43<L>    eGFR: 107    Ca    8.8      02-26  Mg     1.80     02-26  Phos  2.9     02-26    TPro  5.8<L>  /  Alb  2.9<L>  /  TBili  0.5  /  DBili  x   /  AST  23  /  ALT  122<H>  /  AlkPhos  96  02-26      A1C with Estimated Average Glucose Result: 6.9 % (02-20-24 @ 00:25)  A1C with Estimated Average Glucose Result: 6.6 % (10-31-23 @ 05:58)      Thyroid Function Tests:  02-19 @ 18:53 TSH 2.50 FreeT4 -- T3 -- Anti TPO -- Anti Thyroglobulin Ab -- TSI --  02-19 @ 16:45 TSH 2.66 FreeT4 -- T3 -- Anti TPO -- Anti Thyroglobulin Ab -- TSI --

## 2024-02-27 NOTE — CHART NOTE - NSCHARTNOTEFT_GEN_A_CORE
Discussed with cardiology, given concern for bradycardia will discontinue digoxin. Family and patient aware and in agreement with plan. Dr. Stein made aware. Will continue to monitor patient closely.

## 2024-02-27 NOTE — DISCHARGE NOTE PROVIDER - NSDCMRMEDTOKEN_GEN_ALL_CORE_FT
atorvastatin 20 mg oral tablet: 1 tab(s) orally once a day  enalapril 2.5 mg oral tablet: 1 tab(s) orally once a day  Janumet 50 mg-1000 mg oral tablet: 2 tab(s) orally once a day  Jardiance 10 mg oral tablet: 1 tab(s) orally once a day  meclizine 25 mg oral tablet: 1 tab(s) orally every 6 hours as needed for Dizziness  metoprolol tartrate 25 mg oral tablet: 1 tab(s) orally 2 times a day  warfarin 2.5 mg oral tablet: 1 tab(s) orally 2 tabs on M/W/F; 1 tab on Tu/Th/Sa/Su   atorvastatin 20 mg oral tablet: 1 tab(s) orally once a day  cholecalciferol oral tablet: 1 tab(s) orally once a day 1000 unit(s) orally once a day  enalapril 2.5 mg oral tablet: 1 tab(s) orally once a day  Janumet 50 mg-1000 mg oral tablet: 2 tab(s) orally once a day  Jardiance 10 mg oral tablet: 1 tab(s) orally once a day  meclizine 25 mg oral tablet: 1 tab(s) orally every 6 hours as needed for Dizziness  metoprolol succinate 100 mg oral tablet, extended release: 1 tab(s) orally 2 times a day  warfarin 2.5 mg oral tablet: 1 tab(s) orally 2 tabs on M/W/F; 1 tab on Tu/Th/Sa/Su   atorvastatin 20 mg oral tablet: 1 tab(s) orally once a day  cholecalciferol oral tablet: 1 tab(s) orally once a day 1000 unit(s) orally once a day  enalapril 2.5 mg oral tablet: 1 tab(s) orally once a day  Janumet 50 mg-1000 mg oral tablet: 2 tab(s) orally once a day  meclizine 25 mg oral tablet: 1 tab(s) orally every 6 hours as needed for Dizziness  metoprolol succinate 100 mg oral tablet, extended release: 1 tab(s) orally 2 times a day  warfarin 2.5 mg oral tablet: 1 tab(s) orally 2 tabs on M/W/F; 1 tab on Tu/Th/Sa/Su

## 2024-02-27 NOTE — PROGRESS NOTE ADULT - SUBJECTIVE AND OBJECTIVE BOX
[facility], Division of Infectious Diseases  CHAYO Melendez Y. Patel, S. Shah, G. Rigoberto  [phone]  ([phone] - weekdays after 5pm and weekends)    Name: NAOMI RUBALCAVA  Age/Gender: 68y Female  MRN: [id number]    Interval History:  Notes reviewed.   No concerning overnight events.  Afebrile.   reports R arm pain, swelling and redness are improving    Allergies: No Known Allergies      Objective:  Vitals:   T(F): 97.4 (02-27-24 @ 09:55), Max: 97.9 (02-26-24 @ 17:20)  HR: 77 (02-27-24 @ 09:55) (77 - 87)  BP: 103/52 (02-27-24 @ 09:55) (103/52 - 131/69)  RR: 18 (02-27-24 @ 09:55) (18 - 18)  SpO2: 98% (02-27-24 @ 09:55) (98% - 100%)  Physical Examination:  General: no acute distress  HEENT: anicteric  Cardio: S1, S2, normal rate  Resp: clear to auscultation  Abd: soft, NT, ND  Ext: RUE palpable cord, mild erythema  Skin: warm, dry    Laboratory Studies:  CBC:                       11.1   4.60  )-----------( 372      ( 27 Feb 2024 05:15 )             35.1     WBC Trend:  4.60 02-27-24 @ 05:15  5.66 02-26-24 @ 04:30  8.74 02-25-24 @ 05:25  11.17 02-24-24 @ 05:51  9.88 02-23-24 @ 05:00  12.71 02-22-24 @ 00:21  15.00 02-21-24 @ 16:36  18.18 02-21-24 @ 00:40  18.80 02-20-24 @ 18:10    CMP: 02-26    140  |  103  |  17  ----------------------------<  128<H>  4.2   |  28  |  0.43<L>    Ca    8.8      26 Feb 2024 04:30  Phos  2.9     02-26  Mg     1.80     02-26    TPro  5.8<L>  /  Alb  2.9<L>  /  TBili  0.5  /  DBili  x   /  AST  23  /  ALT  122<H>  /  AlkPhos  96  02-26      LIVER FUNCTIONS - ( 26 Feb 2024 04:30 )  Alb: 2.9 g/dL / Pro: 5.8 g/dL / ALK PHOS: 96 U/L / ALT: 122 U/L / AST: 23 U/L / GGT: x             Urinalysis Basic - ( 26 Feb 2024 04:30 )    Color: x / Appearance: x / SG: x / pH: x  Gluc: 128 mg/dL / Ketone: x  / Bili: x / Urobili: x   Blood: x / Protein: x / Nitrite: x   Leuk Esterase: x / RBC: x / WBC x   Sq Epi: x / Non Sq Epi: x / Bacteria: x      Microbiology: reviewed     Culture - Sputum (collected 02-20-24 @ 12:40)  Source: .Sputum Sputum  Gram Stain (02-20-24 @ 23:17):    Numerous polymorphonuclear leukocytes per low power field    Few Squamous epithelial cells per low power field    Moderate Gram positive cocci in pairs per oil power field    Few Gram Variable Rods per oil power field  Final Report (02-22-24 @ 06:52):    Normal Respiratory Vera present    Culture - Urine (collected 02-20-24 @ 01:10)  Source: Catheterized Catheterized  Final Report (02-21-24 @ 07:26):    <10,000 CFU/mL Normal Urogenital Vera    Culture - Urine (collected 02-19-24 @ 18:18)  Source: Clean Catch Clean Catch (Midstream)  Final Report (02-22-24 @ 09:45):    50,000 - 99,000 CFU/mL Escherichia coli    <10,000 CFU/ml Normal Urogenital vera present  Organism: Escherichia coli (02-22-24 @ 09:45)  Organism: Escherichia coli (02-22-24 @ 09:45)      Method Type: TUNDE      -  Amoxicillin/Clavulanic Acid: S <=8/4      -  Ampicillin: R >16 These ampicillin results predict results for amoxicillin      -  Ampicillin/Sulbactam: I 16/8      -  Aztreonam: S <=4      -  Cefazolin: S <=2 For uncomplicated UTI with K. pneumoniae, E. coli, or P. mirablis: TUNDE <=16 is sensitive and TUNDE >=32 is resistant. This also predicts results for oral agents cefaclor, cefdinir, cefpodoxime, cefprozil, cefuroxime axetil, cephalexin and locarbef for uncomplicated UTI. Note that some isolates may be susceptible to these agents while testing resistant to cefazolin.      -  Cefepime: S <=2      -  Cefoxitin: S <=8      -  Ceftriaxone: S <=1      -  Cefuroxime: S <=4      -  Ciprofloxacin: S <=0.25      -  Ertapenem: S <=0.5      -  Gentamicin: R >8      -  Imipenem: S <=1      -  Levofloxacin: S <=0.5      -  Meropenem: S <=1      -  Nitrofurantoin: S <=32 Should not be used to treat pyelonephritis      -  Piperacillin/Tazobactam: S <=8      -  Tobramycin: R 8      -  Trimethoprim/Sulfamethoxazole: S <=0.5/9.5    Culture - Blood (collected 02-19-24 @ 15:21)  Source: .Blood Blood-Peripheral  Final Report (02-24-24 @ 19:01):    No growth at 5 days    Culture - Blood (collected 02-19-24 @ 14:43)  Source: .Blood Blood-Peripheral  Final Report (02-24-24 @ 17:01):    No growth at 5 days        Radiology: reviewed     Medications:  dextrose 5%. 1000 milliLiter(s) IV Continuous <Continuous>  dextrose 5%. 1000 milliLiter(s) IV Continuous <Continuous>  dextrose 50% Injectable 25 Gram(s) IV Push once  dextrose 50% Injectable 12.5 Gram(s) IV Push once  dextrose 50% Injectable 25 Gram(s) IV Push once  dextrose Oral Gel 15 Gram(s) Oral once PRN  digoxin     Tablet 125 MICROGram(s) Oral every other day  enalapril 2.5 milliGRAM(s) Oral daily  glucagon  Injectable 1 milliGRAM(s) IntraMuscular once  guaiFENesin Oral Liquid (Sugar-Free) 200 milliGRAM(s) Oral every 6 hours PRN  ibuprofen  Tablet. 400 milliGRAM(s) Oral every 8 hours  insulin glargine Injectable (LANTUS) 7 Unit(s) SubCutaneous <User Schedule>  insulin lispro (ADMELOG) corrective regimen sliding scale   SubCutaneous three times a day before meals  insulin lispro (ADMELOG) corrective regimen sliding scale   SubCutaneous at bedtime  insulin lispro Injectable (ADMELOG) 4 Unit(s) SubCutaneous three times a day before meals  metoprolol succinate  milliGRAM(s) Oral two times a day  sodium chloride 0.65% Nasal 1 Spray(s) Both Nostrils two times a day PRN  warfarin 2.5 milliGRAM(s) Oral once    Antimicrobials:

## 2024-02-27 NOTE — PROGRESS NOTE ADULT - NUTRITIONAL ASSESSMENT
This patient has been assessed with a concern for Malnutrition and has been determined to have a diagnosis/diagnoses of Moderate protein-calorie malnutrition and Underweight (BMI < 19).    This patient is being managed with:   Diet DASH/TLC-  Sodium & Cholesterol Restricted  Consistent Carbohydrate {Evening Snack} (CSTCHOSN)  Supplement Feeding Modality:  Oral  Ensure Max Cans or Servings Per Day:  2       Frequency:  Daily  Entered: Feb 26 2024 10:45AM  
This patient has been assessed with a concern for Malnutrition and has been determined to have a diagnosis/diagnoses of Moderate protein-calorie malnutrition and Underweight (BMI < 19).    This patient is being managed with:   Diet DASH/TLC-  Sodium & Cholesterol Restricted  Consistent Carbohydrate {Evening Snack} (CSTCHOSN)  Supplement Feeding Modality:  Oral  Ensure Max Cans or Servings Per Day:  1       Frequency:  Two Times a day  Entered: Feb 21 2024  8:45PM  
This patient has been assessed with a concern for Malnutrition and has been determined to have a diagnosis/diagnoses of Moderate protein-calorie malnutrition and Underweight (BMI < 19).    This patient is being managed with:   Diet DASH/TLC-  Sodium & Cholesterol Restricted  Consistent Carbohydrate {Evening Snack} (CSTCHOSN)  Supplement Feeding Modality:  Oral  Ensure Max Cans or Servings Per Day:  2       Frequency:  Daily  Entered: Feb 26 2024 10:45AM  
This patient has been assessed with a concern for Malnutrition and has been determined to have a diagnosis/diagnoses of Moderate protein-calorie malnutrition and Underweight (BMI < 19).    This patient is being managed with:   Diet DASH/TLC-  Sodium & Cholesterol Restricted  Consistent Carbohydrate {Evening Snack} (CSTCHOSN)  Supplement Feeding Modality:  Oral  Ensure Max Cans or Servings Per Day:  1       Frequency:  Two Times a day  Entered: Feb 21 2024  8:45PM

## 2024-02-27 NOTE — PROGRESS NOTE ADULT - ASSESSMENT
68-yo Female with PMHx of mitral regurgitation, rheumatic heart disease s/p mechanical AVR/MVR, HTN, chronic afib on digoxin + warfarin, DM2 on Jardiance, who was admitted to MICU w/septic shock 2' UTI, afib w/RVR, and euglycemic DKA    Septic shock 2' UTI  - admitted to MICU  - s/p IVF resuscitation  - weaned off pressor support 2/20/24  - blood cxs 2/19/24 --> (-)  - urine cx 2/19/24 --> non-ESBL E coli  - received ceftriaxone/azithromycin x 1 2/19/24  - zosyn 2/20/24 --> 2/22/24  - ceftriaxone 2/22/24 --> 2/25/24  - appreciate MICU care, ID input    Chronic atrial fibrillation with RVR  - s/p amiodarone bolus (2/20) + gtt, initially started on PO amio load but stopped 2/22/24 since no longer needed as per cardiology  - TTE 2/20  LV systolic function is severely decreased with EF 37 % a change from previous echo 11/23  - repeat ECHO 2/22 unchanged  - continue warfarin, INR goal 2.5-3.5  - c/w digoxin every other day and lopressor   - repeat limited TTE 2/26/24 showed EF = 62%  - appreciate cardiology    Mechanical aortic/mitral valve replacement in setting of rheumatic heart disease  - continue warfarin, INR goal 2.5-3.5; coumadin 2.5 mg x 1 2/27/24 (d/wed with Dr. Juan R Antonio @ [facility])  - heparin gtt d/nicole 2/25/24    Rt forearm thrombophlebitis  - warm compresses to rt forearm   - rt midline removed 2/25/24  - ibuprofen 400 mg tid started 2/27/24    Euglycemic DKA  - A1c 6.9%  - ED: pH 7.27 AG 20, Bicarb 15, Glucose 201, Lactate 4  - s/p 4 L IVF in ED  - transitioned off insulin with 6 units Lantus (2/20)  - basal/bolus insulin dosing as per endocrinology  - continue low admelog correction scales before meals and bedtime  - follow up with outpatient endocrinologist Dr. Ch  - stop jardiance on discharge given urosepsis and EDKA. can consider resuming in future as outpt  - lactate has normalized --> may resume janumet 50-1000mg BID upon d/c, may need additional agent as well depending on blood glucose values  - appreciate endocrinology    LUISA  - Cr from 11/2023 of 0.39  - admission Cr of 0.89  - LUISA likely pre-renal in the setting of shock  - s/p lasix 40mg IVP (2/21)   - diuretics PRN goal to keep net even  - indwelling catheter placed 2/20, passed TOV 2/22/24  - resolved    HTN  - home enalapril initially held in the setting of hypotension   - resumed 2/27/24    HLD  - atorvastatin 20 mg daily held iso elevated LFT's  - will resume as outpt in 2-3 weeks    Transaminitis   - unknown etiology  - hepatitis panel neg  - RUQ US 2/20 w/findings suggestive of steatosis, cholelithiasis, GB nonspecific mural thickening  - improving    Need for prophylaxis  - daily dosed coumadin

## 2024-02-27 NOTE — PROGRESS NOTE ADULT - ASSESSMENT
67 y/o F PMhx mechanical aortic replacement, rheumatic heart disease, mitral valve repair, atrial fibrillation, HTN, DM 2 on Jardiance who presented w/ URI symptoms and dysuria  found to have septic shock, DKA, UTI, elevated liver enzymes    RUE thrombophlebitis  RUE palpable cord, mild erythema, mild tenderness  she would like to try a conservative approach w/ heat packs and no antibiotics    UTI- treated  septic shock- resolved  no flank tenderness  urine cx w/ E Coli, sensitivities reviewed  s/p 7 day course of antibiotics, completed 2/25    elevated liver enzymes- improved  hepatic viral serologies negative  abd US- suggestive of steatosis. Cholelithiasis. Nonspecific mild mural thickening. Findings likely associated with underlying liver disease.    Recommendations  warm compresses for thrombophlebitis  monitor for improvement    Trell Franklin M.D.  [facility], Division of Infectious Diseases  [phone]  After 5pm on weekdays and all day on weekends - please call [phone]

## 2024-02-27 NOTE — DISCHARGE NOTE PROVIDER - PROVIDER TOKENS
PROVIDER:[TOKEN:[[id number]]] PROVIDER:[TOKEN:[[id number]]],PROVIDER:[TOKEN:[[id number]]]

## 2024-02-27 NOTE — DISCHARGE NOTE PROVIDER - HOSPITAL COURSE
68-yo Female with PMHx of mitral regurgitation, rheumatic heart disease s/p mechanical AVR/MVR, HTN, chronic afib on digoxin + warfarin, DM2 on Jardiance, who was admitted to MICU w/septic shock 2' UTI, afib w/RVR, and euglycemic DKA    Septic shock 2' UTI  - admitted to MICU  - s/p IVF resuscitation  - weaned off pressor support 2/20/24  - blood cxs 2/19/24 --> (-)  - urine cx 2/19/24 --> non-ESBL E coli  - received ceftriaxone/azithromycin x 1 2/19/24  - zosyn 2/20/24 --> 2/22/24  - ceftriaxone 2/22/24 --> 2/25/24  - appreciate MICU care, ID input    Chronic atrial fibrillation with RVR  - s/p amiodarone bolus (2/20) + gtt, initially started on PO amio load but stopped 2/22/24 since no longer needed as per cardiology  - TTE 2/20  LV systolic function is severely decreased with EF 37 % a change from previous echo 11/23  - repeat ECHO 2/22 unchanged  - continue warfarin, INR goal 2.5-3.5  - c/w digoxin every other day and lopressor   - repeat limited TTE to evaluate LV systolic fxn - EF 62%  - appreciate cardiology  - op cards fu    Mechanical aortic/mitral valve replacement in setting of rheumatic heart disease  - continue warfarin, INR goal 2.5-3.5; hold coumadin 2/26/24  - heparin gtt d/nicole 2/25/24    Rt forearm thrombophlebitis  - warm compresses to rt forearm   - rt midline removed 2/25/24    Euglycemic DKA  - A1c 6.9%  - ED: pH 7.27 AG 20, Bicarb 15, Glucose 201, Lactate 4  - s/p 4 L IVF in ED  - transitioned off insulin with 6 units Lantus (2/20)  - basal/bolus insulin dosing as per endocrinology  - continue low admelog correction scales before meals and bedtime  - follow up with outpatient endocrinologist Dr. Ch  - stop jardiance on discharge given urosepsis and EDKA. can consider resuming in future as outpt  - lactate has normalized --> may resume janumet 50-1000mg BID upon d/c, may need additional agent as well depending on blood glucose values  - appreciate endocrinology, op fu     Endocrine following recs:  Discharge plan:  -follow up with outpatient endocrinologist Dr. Ch  -stop jardiance on discharge given urosepsis and EDKA. can consider resuming in future as outpt    LUISA  - Cr from 11/2023 of 0.39  - admission Cr of 0.89  - LUISA likely pre-renal in the setting of shock  - s/p lasix 40mg IVP (2/21)   - diuretics PRN goal to keep net even  - indwelling catheter placed 2/20, passed TOV 2/22/24  - resolved    HTN  - home enalapril initially held in the setting of hypotension   - resumed 2/27/24    HLD  - atorvastatin 20 mg daily held iso elevated LFT's  - will resume as outpt in 2-3 weeks    Transaminitis   - unknown etiology  - hepatitis panel neg  - RUQ US 2/20 w/findings suggestive of steatosis, cholelithiasis, GB nonspecific mural thickening  - improving    Need for prophylaxis  - daily dosed coumadin, was supratherapeutic, per op cardiology, discharge on _______    Patient seen and evaluated, no acute somatic complaints. Reviewed discharge medications with patient; All new medications requiring new prescriptions were sent to the pharmacy of patient's choice. Reviewed need for prescription for previous home medications and new prescriptions sent if requested. Medically cleared/stable for discharge as per Dr. Stein on 2/27/24 with close outpatient follow up within 1-2 weeks of discharge. Patient understands and agrees with plan of care.    68-yo Female with PMHx of mitral regurgitation, rheumatic heart disease s/p mechanical AVR/MVR, HTN, chronic afib on digoxin + warfarin, DM2 on Jardiance, who was admitted to MICU w/septic shock 2' UTI, afib w/RVR, and euglycemic DKA    Septic shock 2' UTI  - admitted to MICU  - s/p IVF resuscitation  - weaned off pressor support 2/20/24  - blood cxs 2/19/24 --> (-)  - urine cx 2/19/24 --> non-ESBL E coli  - received ceftriaxone/azithromycin x 1 2/19/24  - zosyn 2/20/24 --> 2/22/24  - ceftriaxone 2/22/24 --> 2/25/24  - appreciate MICU care, ID input    Chronic atrial fibrillation with RVR  - s/p amiodarone bolus (2/20) + gtt, initially started on PO amio load but stopped 2/22/24 since no longer needed as per cardiology  - TTE 2/20  LV systolic function is severely decreased with EF 37 % a change from previous echo 11/23  - repeat ECHO 2/22 unchanged  - continue warfarin, INR goal 2.5-3.5  - c/w digoxin every other day and lopressor   - repeat limited TTE to evaluate LV systolic fxn - EF 62%  - appreciate cardiology  - op cards fu    Mechanical aortic/mitral valve replacement in setting of rheumatic heart disease  - continue warfarin, INR goal 2.5-3.5; hold coumadin 2/26/24  - heparin gtt d/nicole 2/25/24    Rt forearm thrombophlebitis  - warm compresses to rt forearm   - rt midline removed 2/25/24    Euglycemic DKA  - A1c 6.9%  - ED: pH 7.27 AG 20, Bicarb 15, Glucose 201, Lactate 4  - s/p 4 L IVF in ED  - transitioned off insulin with 6 units Lantus (2/20)  - basal/bolus insulin dosing as per endocrinology  - continue low admelog correction scales before meals and bedtime  - follow up with outpatient endocrinologist Dr. Ch  - stop jardiance on discharge given urosepsis and EDKA. can consider resuming in future as outpt  - lactate has normalized --> may resume janumet 50-1000mg BID upon d/c, may need additional agent as well depending on blood glucose values  - appreciate endocrinology, op fu     Endocrine following recs:  Discharge plan:  -follow up with outpatient endocrinologist Dr. Ch  -stop jardiance on discharge given urosepsis and EDKA. can consider resuming in future as outpt    LUISA  - Cr from 11/2023 of 0.39  - admission Cr of 0.89  - LUISA likely pre-renal in the setting of shock  - s/p lasix 40mg IVP (2/21)   - diuretics PRN goal to keep net even  - indwelling catheter placed 2/20, passed TOV 2/22/24  - resolved    HTN  - home enalapril initially held in the setting of hypotension   - resumed 2/27/24    HLD  - atorvastatin 20 mg daily held iso elevated LFT's  - will resume as outpt in 2-3 weeks    Transaminitis   - unknown etiology  - hepatitis panel neg  - RUQ US 2/20 w/findings suggestive of steatosis, cholelithiasis, GB nonspecific mural thickening  - improving    Need for prophylaxis  - daily dosed coumadin, was supratherapeutic, per op cardiology, discharge on 2.5 coumadin     On 2/28/24, case was discussed with Dr. Santizo, patient is medically cleared and optimized for discharge today. All medications were reviewed with attending, and sent to mutually agreed upon pharmacy.     68-yo Female with PMHx of mitral regurgitation, rheumatic heart disease s/p mechanical AVR/MVR, HTN, chronic afib on digoxin + warfarin, DM2 on Jardiance, who was admitted to MICU w/septic shock 2' UTI, afib w/RVR, and euglycemic DKA    Septic shock 2' UTI  - admitted to MICU  - s/p IVF resuscitation  - weaned off pressor support 2/20/24  - blood cxs 2/19/24 --> (-)  - urine cx 2/19/24 --> non-ESBL E coli  - received ceftriaxone/azithromycin x 1 2/19/24  - zosyn 2/20/24 --> 2/22/24  - ceftriaxone 2/22/24 --> 2/25/24  - appreciate MICU care, ID input    Chronic atrial fibrillation with RVR  - s/p amiodarone bolus (2/20) + gtt, initially started on PO amio load but stopped 2/22/24 since no longer needed as per cardiology  - TTE 2/20  LV systolic function is severely decreased with EF 37 % a change from previous echo 11/23  - repeat ECHO 2/22 unchanged  - continue warfarin, INR goal 2.5-3.5  - c/w digoxin every other day and lopressor   - repeat limited TTE to evaluate LV systolic fxn - EF 62%  - appreciate cardiology  - op cards fu    Mechanical aortic/mitral valve replacement in setting of rheumatic heart disease  - continue warfarin, INR goal 2.5-3.5; hold coumadin 2/26/24  - heparin gtt d/nicole 2/25/24    Rt forearm thrombophlebitis  - warm compresses to rt forearm   - rt midline removed 2/25/24    Euglycemic DKA  - A1c 6.9%  - ED: pH 7.27 AG 20, Bicarb 15, Glucose 201, Lactate 4  - s/p 4 L IVF in ED  - transitioned off insulin with 6 units Lantus (2/20)  - basal/bolus insulin dosing as per endocrinology  - continue low admelog correction scales before meals and bedtime  - follow up with outpatient endocrinologist Dr. Ch  - stop jardiance on discharge given urosepsis and EDKA. can consider resuming in future as outpt  - lactate has normalized --> may resume janumet 50-1000mg BID upon d/c, may need additional agent as well depending on blood glucose values  - appreciate endocrinology, op fu     Endocrine following recs:  Discharge plan:  -follow up with outpatient endocrinologist Dr. Ch  -stop jardiance on discharge given urosepsis and EDKA. can consider resuming in future as outpt    LUISA  - Cr from 11/2023 of 0.39  - admission Cr of 0.89  - LUISA likely pre-renal in the setting of shock  - s/p lasix 40mg IVP (2/21)   - diuretics PRN goal to keep net even  - indwelling catheter placed 2/20, passed TOV 2/22/24  - resolved    HTN  - home enalapril initially held in the setting of hypotension   - resumed 2/27/24    HLD  - atorvastatin 20 mg daily held iso elevated LFT's  - will resume as outpt in 2-3 weeks    Transaminitis   - unknown etiology  - hepatitis panel neg  - RUQ US 2/20 w/findings suggestive of steatosis, cholelithiasis, GB nonspecific mural thickening  - improving    Need for prophylaxis  - daily dosed coumadin, was supratherapeutic, per op cardiology, discharge on 2.5 coumadin     On 2/28/24, case was discussed with Dr. Santizo, patient is medically cleared and optimized for discharge today. All medications were reviewed with attending, and sent to mutually agreed upon pharmacy.    Attending Addendum:  Patient seen and examined by me on the discharge day. Medications reviewed.   Feeling much better. No cp, no sob. no abd pain. no n/v/d. no HA, no Dizziness.  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   All questions answered in details. Follow up plan explained. d/w NP/PA. Coumadin therapeutic. can follow outpt regimen.  Toprolol 100 mg XL new regimen. HR stable. outpt follow up with pt's cardiologist Dr. Antonio at [facility].  No more Jardice per endo given recent euglycemic DKA. outpt endo follow up.   More than 30 mins were spent evaluating patient and coordinating care for discharge.  Discharge summary sent to pt's primary care physician at [facility].

## 2024-02-27 NOTE — PROGRESS NOTE ADULT - ASSESSMENT
68 year old woman with PMH of mechanical aortic valve, rheumatic heart disease, afib, HTN and DM2 admitted with euglycemic DKA and afib with RVR.     1) T2DM with hyperglycemia  eDKA in setting of infection and afib /w RVR - now resolved  A1c 6.9%  Home regimen - jardiance and janumet    Inpatient plan  - FS goal 100-180 mg/dl   - fasting glucose at goal   - continue Lantus 7 units daily at 6 pm   - prandial glucose above goal   - increase Admelog to 5 units TID AC. Hold if not eating/NPO.   - continue low Admelog correctional scale TID AC  - continue separate low Admelog correctional scale at HS   - FS TID AC & HS --> q6 if NPO   - hypoglycemia protocol PRN     Discharge plan  - follow up with outpatient endocrinologist Dr. Ch  - STOP Jardiance on discharge given urosepsis and eDKA, can consider resuming in future as outpatient.   - EF noted to be 30-15% - Possibly discharge on Janumet 50-1000mg BID, but may need to  pending EF  - May need additional agent as well depending on blood glucose values. Lactate now normalized 0.8 on 02/25/2024.    2) HTN  - goal BP <130/80  - continue enalapril, metoprolol  - management per primary team     3) HLD  - goal LDL <70   - atorvastatin being held due to transaminitis, values improving  - defer management to primary team     DAREN TranP-BC  Nurse Practitioner  Division of Endocrinology & Diabetes  pager [phone]

## 2024-02-27 NOTE — PROGRESS NOTE ADULT - SUBJECTIVE AND OBJECTIVE BOX
Ambulating well  No palpitations, CP or SOB  Ventricular rates 60s-80s on tele from overnight without tachycardia or pauses    Vital Signs Last 24 Hrs  T(C): 36.3 (27 Feb 2024 09:55), Max: 36.6 (26 Feb 2024 17:20)  T(F): 97.4 (27 Feb 2024 09:55), Max: 97.9 (26 Feb 2024 17:20)  HR: 77 (27 Feb 2024 09:55) (77 - 87)  BP: 103/52 (27 Feb 2024 09:55) (103/52 - 131/69)  BP(mean): --  RR: 18 (27 Feb 2024 09:55) (18 - 18)  SpO2: 98% (27 Feb 2024 09:55) (98% - 100%)    I&O's Summary      GENERAL: NAD  HEAD:  Atraumatic, Normocephalic  EYES: EOMI, PERRLA, conjunctiva and sclera clear  NECK: Supple, No JVD  CHEST/LUNG: Clear to auscultation bilaterally; Normal respiratory effort w/o intercostal retractions  HEART: Irregular rhythm, tachycardic; nl S1/S2; No murmurs, rubs, or gallops  ABDOMEN: Soft, Nontender, Nondistended; Bowel sounds present; No hepatosplenomegaly  EXTREMITIES:  2+ Peripheral Pulses; (+)rt foream thrombophlebitis w/palpable cord  NEURO: A+O x 3; nonfocal CN/motor/sensory/reflexes; speech + comprehension are intact  PSYCH: Nl affect; no delirium or agitation; no suicidal/homicidal ideation    LABS:                        11.1   4.60  )-----------( 372      ( 27 Feb 2024 05:15 )             35.1     02-26    140  |  103  |  17  ----------------------------<  128<H>  4.2   |  28  |  0.43<L>    Ca    8.8      26 Feb 2024 04:30  Phos  2.9     02-26  Mg     1.80     02-26    TPro  5.8<L>  /  Alb  2.9<L>  /  TBili  0.5  /  DBili  x   /  AST  23  /  ALT  122<H>  /  AlkPhos  96  02-26    PT/INR - ( 27 Feb 2024 08:58 )   PT: 44.5 sec;   INR: 4.10 ratio         PTT - ( 26 Feb 2024 04:30 )  PTT:47.0 sec  CAPILLARY BLOOD GLUCOSE      POCT Blood Glucose.: 160 mg/dL (27 Feb 2024 16:35)  POCT Blood Glucose.: 224 mg/dL (27 Feb 2024 11:33)  POCT Blood Glucose.: 120 mg/dL (27 Feb 2024 07:51)  POCT Blood Glucose.: 237 mg/dL (26 Feb 2024 21:14)  POCT Blood Glucose.: 261 mg/dL (26 Feb 2024 18:36)        Urinalysis Basic - ( 26 Feb 2024 04:30 )    Color: x / Appearance: x / SG: x / pH: x  Gluc: 128 mg/dL / Ketone: x  / Bili: x / Urobili: x   Blood: x / Protein: x / Nitrite: x   Leuk Esterase: x / RBC: x / WBC x   Sq Epi: x / Non Sq Epi: x / Bacteria: x        RADIOLOGY & ADDITIONAL TESTS:    Imaging Personally Reviewed:  [x] YES  [ ] NO    Case discussed with NPP:  [X] YES  [ ] NO

## 2024-02-27 NOTE — DISCHARGE NOTE PROVIDER - NSDCCPCAREPLAN_GEN_ALL_CORE_FT
PRINCIPAL DISCHARGE DIAGNOSIS  Diagnosis: Diabetic ketoacidosis  Assessment and Plan of Treatment: .Continue a consistent carbohydrate diet (Meaning eating the same amount of carbohydrates at the same time each day). Monitor blood glucose levels four times a day before meals and at bedtime. Record blood sugars and bring to outpatient providers appointment in order to be reviewed by your doctor for management modifications. If your sugars are more than 400 or less than 70 you should contact your Primary Care Physician immediately. Monitor for signs/symptoms of low blood glucose, such as, dizziness, altered mental status, or cool/clammy skin. In addition, monitor for signs/symptoms of high blood glucose, such as, feeling hot, dry, fatigued, or with increased thirst/urination.  Exercise daily for at least 30 minutes and weight loss.  Follow up with your primary care physician and endocrinologist for routine Hemoglobin A1C checks and management.  Follow up with your ophthalmologist for routine yearly vision exams. Make regular podiatry appointments in order to have feet checked for wounds.   - follow up with outpatient endocrinologist Dr. Ch  - darek brown on discharge given urosepsis and EDKA. can consider resuming in future as outpt      SECONDARY DISCHARGE DIAGNOSES  Diagnosis: Atrial fibrillation with RVR  Assessment and Plan of Treatment: Cardiology adjusted your medications. From medication changes, your echocardiogram showed an improvement of ejection fraction from 37% to 62% which is a normal ejection fraction. Coumadin was held for high INR. Per your outpatient cardiologist, please follow up with him and take at _______ mg, INR goal 2.5-3.5.    Diagnosis: Acute UTI  Assessment and Plan of Treatment: You had sepsis from urine infection requiring ICU admission due to dangerously low blood pressures which requires vasopressor support. Your infection was from ecoli and you completed antibiotics. Infectious disease saw you.    Diagnosis: Transaminitis  Assessment and Plan of Treatment: Your liver function temporarily worsened from your infection. Per ICU, resume atorvastatin which was held for this in 2-3 weeks outpatient. Sonogram of the liver showed fatty liver and small gall stones.     PRINCIPAL DISCHARGE DIAGNOSIS  Diagnosis: Diabetic ketoacidosis  Assessment and Plan of Treatment: .Continue a consistent carbohydrate diet (Meaning eating the same amount of carbohydrates at the same time each day). Monitor blood glucose levels four times a day before meals and at bedtime. Record blood sugars and bring to outpatient providers appointment in order to be reviewed by your doctor for management modifications. If your sugars are more than 400 or less than 70 you should contact your Primary Care Physician immediately. Monitor for signs/symptoms of low blood glucose, such as, dizziness, altered mental status, or cool/clammy skin. In addition, monitor for signs/symptoms of high blood glucose, such as, feeling hot, dry, fatigued, or with increased thirst/urination.  Exercise daily for at least 30 minutes and weight loss.  Follow up with your primary care physician and endocrinologist for routine Hemoglobin A1C checks and management.  Follow up with your ophthalmologist for routine yearly vision exams. Make regular podiatry appointments in order to have feet checked for wounds.   - follow up with outpatient endocrinologist Dr. Ch  - darek brown on discharge given urosepsis and EDKA. can consider resuming in future as outpt      SECONDARY DISCHARGE DIAGNOSES  Diagnosis: Atrial fibrillation with RVR  Assessment and Plan of Treatment: Cardiology adjusted your medications. From medication changes, your echocardiogram showed an improvement of ejection fraction from 37% to 62% which is a normal ejection fraction. Coumadin was held for high INR. Per your outpatient cardiologist, please follow up with him and take at  2.5mg, INR goal 2.5-3.5. **have regular blood work done to monitor your INR level*****    Diagnosis: Acute UTI  Assessment and Plan of Treatment: You had sepsis from urine infection requiring ICU admission due to dangerously low blood pressures which requires vasopressor support. Your infection was from ecoli and you completed antibiotics. Infectious disease saw you.    Diagnosis: Transaminitis  Assessment and Plan of Treatment: Your liver function temporarily worsened from your infection. Per ICU, resume atorvastatin which was held for this in 2-3 weeks outpatient. Sonogram of the liver showed fatty liver and small gall stones.  *****Have repeat blood work performed in 1-2 weeks at your primary care doctors office*****

## 2024-02-27 NOTE — DISCHARGE NOTE PROVIDER - CARE PROVIDER_API CALL
Christina Cohn  Internal Medicine  [address] [facility], [facility] [address]  [place] [address]  Phone: [phone]  Fax: [phone]  Follow Up Time:    Christina Cohn  Internal Medicine  [address] [facility], [address]  [place] [address]  Phone: [phone]  Fax: [phone]  Follow Up Time:     Juan R Antonio  Cardiovascular Disease  [address] [place] [address]  Phone: [phone]  Fax: [phone]  Follow Up Time:

## 2024-02-28 ENCOUNTER — TRANSCRIPTION ENCOUNTER (OUTPATIENT)
Age: 69
End: 2024-02-28

## 2024-02-28 VITALS
OXYGEN SATURATION: 98 % | DIASTOLIC BLOOD PRESSURE: 57 MMHG | RESPIRATION RATE: 18 BRPM | TEMPERATURE: 98 F | HEART RATE: 75 BPM | SYSTOLIC BLOOD PRESSURE: 109 MMHG

## 2024-02-28 LAB
GLUCOSE BLDC GLUCOMTR-MCNC: 155 MG/DL — HIGH (ref 70–99)
GLUCOSE BLDC GLUCOMTR-MCNC: 186 MG/DL — HIGH (ref 70–99)
HCT VFR BLD CALC: 37 % — SIGNIFICANT CHANGE UP (ref 34.5–45)
HGB BLD-MCNC: 11.7 G/DL — SIGNIFICANT CHANGE UP (ref 11.5–15.5)
INR BLD: 2.75 RATIO — HIGH (ref 0.85–1.18)
MCHC RBC-ENTMCNC: 29.5 PG — SIGNIFICANT CHANGE UP (ref 27–34)
MCHC RBC-ENTMCNC: 31.6 GM/DL — LOW (ref 32–36)
MCV RBC AUTO: 93.2 FL — SIGNIFICANT CHANGE UP (ref 80–100)
NRBC # BLD: 0 /100 WBCS — SIGNIFICANT CHANGE UP (ref 0–0)
NRBC # FLD: 0 K/UL — SIGNIFICANT CHANGE UP (ref 0–0)
PLATELET # BLD AUTO: 404 K/UL — HIGH (ref 150–400)
PROTHROM AB SERPL-ACNC: 29.9 SEC — HIGH (ref 9.5–13)
RBC # BLD: 3.97 M/UL — SIGNIFICANT CHANGE UP (ref 3.8–5.2)
RBC # FLD: 15.5 % — HIGH (ref 10.3–14.5)
WBC # BLD: 4.82 K/UL — SIGNIFICANT CHANGE UP (ref 3.8–10.5)
WBC # FLD AUTO: 4.82 K/UL — SIGNIFICANT CHANGE UP (ref 3.8–10.5)

## 2024-02-28 PROCEDURE — 99232 SBSQ HOSP IP/OBS MODERATE 35: CPT

## 2024-02-28 RX ORDER — WARFARIN SODIUM 2.5 MG/1
2.5 TABLET ORAL ONCE
Refills: 0 | Status: DISCONTINUED | OUTPATIENT
Start: 2024-02-28 | End: 2024-02-28

## 2024-02-28 RX ORDER — EMPAGLIFLOZIN 10 MG/1
1 TABLET, FILM COATED ORAL
Refills: 0 | DISCHARGE

## 2024-02-28 RX ORDER — CHOLECALCIFEROL (VITAMIN D3) 125 MCG
1 CAPSULE ORAL
Qty: 30 | Refills: 0
Start: 2024-02-28 | End: 2024-03-28

## 2024-02-28 RX ORDER — METOPROLOL TARTRATE 50 MG
1 TABLET ORAL
Qty: 60 | Refills: 0
Start: 2024-02-28 | End: 2024-03-28

## 2024-02-28 RX ORDER — METOPROLOL TARTRATE 50 MG
1 TABLET ORAL
Refills: 0 | DISCHARGE

## 2024-02-28 RX ADMIN — Medication 5 UNIT(S): at 07:50

## 2024-02-28 RX ADMIN — Medication 400 MILLIGRAM(S): at 06:00

## 2024-02-28 RX ADMIN — Medication 1: at 11:23

## 2024-02-28 RX ADMIN — Medication 100 MILLIGRAM(S): at 05:08

## 2024-02-28 RX ADMIN — Medication 5 UNIT(S): at 11:23

## 2024-02-28 RX ADMIN — Medication 1000 UNIT(S): at 13:25

## 2024-02-28 RX ADMIN — Medication 400 MILLIGRAM(S): at 05:07

## 2024-02-28 RX ADMIN — Medication 1: at 07:50

## 2024-02-28 NOTE — PROGRESS NOTE ADULT - PROBLEM SELECTOR PROBLEM 2
Hypertension
Well controlled type 2 diabetes mellitus

## 2024-02-28 NOTE — PROGRESS NOTE ADULT - SUBJECTIVE AND OBJECTIVE BOX
[facility], Division of Infectious Diseases  CHAYO Melendez Y. Patel, S. Shah, G. Rigoberto  [phone]  ([phone] - weekdays after 5pm and weekends)    Name: NAOMI RUBALCAVA  Age/Gender: 68y Female  MRN: [id number]    Interval History:  Notes reviewed.   No concerning overnight events.  Afebrile.   reports R arm is no longer tender  redness has improved  denies  symptoms or diarrhea    Allergies: No Known Allergies      Objective:  Vitals:   T(F): 97.6 (02-28-24 @ 09:15), Max: 98 (02-27-24 @ 17:10)  HR: 75 (02-28-24 @ 09:15) (75 - 95)  BP: 109/57 (02-28-24 @ 09:15) (106/61 - 123/75)  RR: 18 (02-28-24 @ 09:15) (18 - 18)  SpO2: 98% (02-28-24 @ 09:15) (98% - 99%)  Physical Examination:  General: no acute distress  HEENT: anicteric  Cardio: S1, S2, normal rate  Resp: clear to auscultation  Abd: soft, NT, ND  Ext: RUE palpable cord, mild erythema  Skin: warm, dry    Laboratory Studies:  CBC:                       11.7   4.82  )-----------( 404      ( 28 Feb 2024 04:35 )             37.0     WBC Trend:  4.82 02-28-24 @ 04:35  4.60 02-27-24 @ 05:15  5.66 02-26-24 @ 04:30  8.74 02-25-24 @ 05:25  11.17 02-24-24 @ 05:51  9.88 02-23-24 @ 05:00  12.71 02-22-24 @ 00:21  15.00 02-21-24 @ 16:36    CMP:               Microbiology: reviewed     Culture - Sputum (collected 02-20-24 @ 12:40)  Source: .Sputum Sputum  Gram Stain (02-20-24 @ 23:17):    Numerous polymorphonuclear leukocytes per low power field    Few Squamous epithelial cells per low power field    Moderate Gram positive cocci in pairs per oil power field    Few Gram Variable Rods per oil power field  Final Report (02-22-24 @ 06:52):    Normal Respiratory Vera present    Culture - Urine (collected 02-20-24 @ 01:10)  Source: Catheterized Catheterized  Final Report (02-21-24 @ 07:26):    <10,000 CFU/mL Normal Urogenital Vera    Culture - Urine (collected 02-19-24 @ 18:18)  Source: Clean Catch Clean Catch (Midstream)  Final Report (02-22-24 @ 09:45):    50,000 - 99,000 CFU/mL Escherichia coli    <10,000 CFU/ml Normal Urogenital vera present  Organism: Escherichia coli (02-22-24 @ 09:45)  Organism: Escherichia coli (02-22-24 @ 09:45)      Method Type: TUNDE      -  Amoxicillin/Clavulanic Acid: S <=8/4      -  Ampicillin: R >16 These ampicillin results predict results for amoxicillin      -  Ampicillin/Sulbactam: I 16/8      -  Aztreonam: S <=4      -  Cefazolin: S <=2 For uncomplicated UTI with K. pneumoniae, E. coli, or P. mirablis: TUNDE <=16 is sensitive and TUNDE >=32 is resistant. This also predicts results for oral agents cefaclor, cefdinir, cefpodoxime, cefprozil, cefuroxime axetil, cephalexin and locarbef for uncomplicated UTI. Note that some isolates may be susceptible to these agents while testing resistant to cefazolin.      -  Cefepime: S <=2      -  Cefoxitin: S <=8      -  Ceftriaxone: S <=1      -  Cefuroxime: S <=4      -  Ciprofloxacin: S <=0.25      -  Ertapenem: S <=0.5      -  Gentamicin: R >8      -  Imipenem: S <=1      -  Levofloxacin: S <=0.5      -  Meropenem: S <=1      -  Nitrofurantoin: S <=32 Should not be used to treat pyelonephritis      -  Piperacillin/Tazobactam: S <=8      -  Tobramycin: R 8      -  Trimethoprim/Sulfamethoxazole: S <=0.5/9.5    Culture - Blood (collected 02-19-24 @ 15:21)  Source: .Blood Blood-Peripheral  Final Report (02-24-24 @ 19:01):    No growth at 5 days    Culture - Blood (collected 02-19-24 @ 14:43)  Source: .Blood Blood-Peripheral  Final Report (02-24-24 @ 17:01):    No growth at 5 days        Radiology: reviewed     Medications:  cholecalciferol 1000 Unit(s) Oral daily  dextrose 5%. 1000 milliLiter(s) IV Continuous <Continuous>  dextrose 5%. 1000 milliLiter(s) IV Continuous <Continuous>  dextrose 50% Injectable 25 Gram(s) IV Push once  dextrose 50% Injectable 12.5 Gram(s) IV Push once  dextrose 50% Injectable 25 Gram(s) IV Push once  dextrose Oral Gel 15 Gram(s) Oral once PRN  enalapril 2.5 milliGRAM(s) Oral daily  glucagon  Injectable 1 milliGRAM(s) IntraMuscular once  guaiFENesin Oral Liquid (Sugar-Free) 200 milliGRAM(s) Oral every 6 hours PRN  ibuprofen  Tablet. 400 milliGRAM(s) Oral every 8 hours  insulin glargine Injectable (LANTUS) 7 Unit(s) SubCutaneous <User Schedule>  insulin lispro (ADMELOG) corrective regimen sliding scale   SubCutaneous three times a day before meals  insulin lispro (ADMELOG) corrective regimen sliding scale   SubCutaneous at bedtime  insulin lispro Injectable (ADMELOG) 5 Unit(s) SubCutaneous three times a day before meals  metoprolol succinate  milliGRAM(s) Oral two times a day  sodium chloride 0.65% Nasal 1 Spray(s) Both Nostrils two times a day PRN    Antimicrobials:

## 2024-02-28 NOTE — PROGRESS NOTE ADULT - PROBLEM SELECTOR PROBLEM 3
Hyperlipidemia
Hypertension
Hyperlipidemia

## 2024-02-28 NOTE — PROGRESS NOTE ADULT - ASSESSMENT
69 y/o F PMhx mechanical aortic replacement, rheumatic heart disease, mitral valve repair, atrial fibrillation, HTN, DM 2 on Jardiance who presented w/ URI symptoms and dysuria  found to have septic shock, DKA, UTI, elevated liver enzymes    RUE thrombophlebitis- improving  RUE palpable cord  now minimal erythema, tenderness resolved    UTI- treated  septic shock- resolved  no flank tenderness  urine cx w/ E Coli, sensitivities reviewed  s/p 7 day course of antibiotics, completed 2/25    elevated liver enzymes- improved  hepatic viral serologies negative  abd US- suggestive of steatosis. Cholelithiasis. Nonspecific mild mural thickening. Findings likely associated with underlying liver disease.    Recommendations  c/w warm compresses for thrombophlebitis  discharge planning    Trell Franklin M.D.  [facility], Division of Infectious Diseases  [phone]  After 5pm on weekdays and all day on weekends - please call [phone]  67 y/o F PMhx mechanical aortic replacement, rheumatic heart disease, mitral valve repair, atrial fibrillation, HTN, DM 2 on Jardiance who presented w/ URI symptoms and dysuria  found to have septic shock, DKA, UTI, elevated liver enzymes    RUE thrombophlebitis- improving  midline removed 2/25  RUE palpable cord  now minimal erythema, tenderness resolved    UTI- treated  septic shock- resolved  no flank tenderness  urine cx w/ E Coli, sensitivities reviewed  s/p 7 day course of antibiotics, completed 2/25    elevated liver enzymes- improved  hepatic viral serologies negative  abd US- suggestive of steatosis. Cholelithiasis. Nonspecific mild mural thickening. Findings likely associated with underlying liver disease.    Recommendations  c/w warm compresses for thrombophlebitis  discharge planning    rTell Franklin M.D.  [facility], Division of Infectious Diseases  [phone]  After 5pm on weekdays and all day on weekends - please call [phone]

## 2024-02-28 NOTE — PROGRESS NOTE ADULT - PROVIDER SPECIALTY LIST ADULT
Cardiology
Endocrinology
Internal Medicine
MICU
Cardiology
Endocrinology
Infectious Disease
Infectious Disease
Internal Medicine
Cardiology
Infectious Disease
Infectious Disease
Internal Medicine
Infectious Disease
Infectious Disease
MICU
MICU
Internal Medicine
Endocrinology

## 2024-02-28 NOTE — PROGRESS NOTE ADULT - PROBLEM SELECTOR PROBLEM 1
Type 2 diabetes mellitus with hyperglycemia
DKA (diabetic ketoacidosis)

## 2024-02-28 NOTE — PROGRESS NOTE ADULT - ASSESSMENT
68 year old woman with PMH of mechanical aortic valve, rheumatic heart disease, afib, HTN and DM2 admitted with euglycemic DKA and afib with RVR.     1) T2DM with hyperglycemia  eDKA in setting of infection and afib /w RVR - now resolved  A1c 6.9%  Home regimen - jardiance and janumet    Inpatient plan  - FS goal 100-180 mg/dl   - fasting glucose at goal   - continue Lantus 7 units daily at 6 pm   - prandial glucose above goal   - increase Admelog to 5 units TID AC. Hold if not eating/NPO.   - continue low Admelog correctional scale TID AC  - continue separate low Admelog correctional scale at HS   - FS TID AC & HS --> q6 if NPO   - hypoglycemia protocol PRN     Discharge plan  - follow up with outpatient endocrinologist Dr. Ch  - STOP Jardiance on discharge given urosepsis and eDKA, can consider resuming in future as outpatient.   - EF improved so okay to discharge home on janumet.    - May need additional agent as well depending on blood glucose values. Lactate now normalized 0.8 on 02/25/2024.    2) HTN  - goal BP <130/80  - continue enalapril, metoprolol  - management per primary team     3) HLD  - goal LDL <70   - atorvastatin being held due to transaminitis, values improving  - defer management to primary team

## 2024-02-28 NOTE — DISCHARGE NOTE NURSING/CASE MANAGEMENT/SOCIAL WORK - PATIENT PORTAL LINK FT
You can access the FollowMyHealth Patient Portal offered by [facility] by registering at the following website: [email or web address]. By joining [employer]’s FollowMyHealth portal, you will also be able to view your health information using other applications (apps) compatible with our system.

## 2024-08-07 ENCOUNTER — APPOINTMENT (OUTPATIENT)
Dept: ULTRASOUND IMAGING | Facility: IMAGING CENTER | Age: 69
End: 2024-08-07

## 2024-08-07 ENCOUNTER — APPOINTMENT (OUTPATIENT)
Dept: MAMMOGRAPHY | Facility: IMAGING CENTER | Age: 69
End: 2024-08-07

## 2024-08-07 ENCOUNTER — OUTPATIENT (OUTPATIENT)
Dept: OUTPATIENT SERVICES | Facility: HOSPITAL | Age: 69
LOS: 1 days | End: 2024-08-07
Payer: COMMERCIAL

## 2024-08-07 DIAGNOSIS — Z00.8 ENCOUNTER FOR OTHER GENERAL EXAMINATION: ICD-10-CM

## 2024-08-07 DIAGNOSIS — Z95.2 PRESENCE OF PROSTHETIC HEART VALVE: Chronic | ICD-10-CM

## 2024-08-07 PROCEDURE — 77067 SCR MAMMO BI INCL CAD: CPT | Mod: 26

## 2024-08-07 PROCEDURE — 77063 BREAST TOMOSYNTHESIS BI: CPT

## 2024-08-07 PROCEDURE — 77067 SCR MAMMO BI INCL CAD: CPT

## 2024-08-07 PROCEDURE — 76641 ULTRASOUND BREAST COMPLETE: CPT | Mod: 26,50

## 2024-08-07 PROCEDURE — 77063 BREAST TOMOSYNTHESIS BI: CPT | Mod: 26

## 2024-08-07 PROCEDURE — 76641 ULTRASOUND BREAST COMPLETE: CPT
